# Patient Record
Sex: MALE | Race: WHITE | NOT HISPANIC OR LATINO | Employment: OTHER | URBAN - METROPOLITAN AREA
[De-identification: names, ages, dates, MRNs, and addresses within clinical notes are randomized per-mention and may not be internally consistent; named-entity substitution may affect disease eponyms.]

---

## 2021-02-26 ENCOUNTER — HOSPITAL ENCOUNTER (INPATIENT)
Facility: HOSPITAL | Age: 66
LOS: 6 days | Discharge: HOME | DRG: 382 | End: 2021-03-05
Attending: EMERGENCY MEDICINE | Admitting: INTERNAL MEDICINE
Payer: COMMERCIAL

## 2021-02-26 DIAGNOSIS — R07.89 ATYPICAL CHEST PAIN: ICD-10-CM

## 2021-02-26 DIAGNOSIS — K31.1 GASTRIC OUTLET OBSTRUCTION: ICD-10-CM

## 2021-02-26 DIAGNOSIS — R10.9 ABDOMINAL PAIN: Primary | ICD-10-CM

## 2021-02-26 DIAGNOSIS — K57.10 DUODENAL DIVERTICULUM: ICD-10-CM

## 2021-02-26 LAB
ALBUMIN SERPL BCP-MCNC: 3.1 G/DL (ref 3.5–5)
ALP SERPL-CCNC: 112 U/L (ref 46–116)
ALT SERPL W P-5'-P-CCNC: 24 U/L (ref 12–78)
ANION GAP SERPL CALCULATED.3IONS-SCNC: 9 MMOL/L (ref 4–13)
AST SERPL W P-5'-P-CCNC: 25 U/L (ref 5–45)
BASOPHILS # BLD AUTO: 0.07 THOUSANDS/ΜL (ref 0–0.1)
BASOPHILS NFR BLD AUTO: 1 % (ref 0–1)
BILIRUB SERPL-MCNC: 0.4 MG/DL (ref 0.2–1)
BUN SERPL-MCNC: 17 MG/DL (ref 5–25)
CALCIUM ALBUM COR SERPL-MCNC: 9.1 MG/DL (ref 8.3–10.1)
CALCIUM SERPL-MCNC: 8.4 MG/DL (ref 8.3–10.1)
CHLORIDE SERPL-SCNC: 106 MMOL/L (ref 100–108)
CO2 SERPL-SCNC: 23 MMOL/L (ref 21–32)
CREAT SERPL-MCNC: 1.12 MG/DL (ref 0.6–1.3)
EOSINOPHIL # BLD AUTO: 0.5 THOUSAND/ΜL (ref 0–0.61)
EOSINOPHIL NFR BLD AUTO: 6 % (ref 0–6)
ERYTHROCYTE [DISTWIDTH] IN BLOOD BY AUTOMATED COUNT: 12.3 % (ref 11.6–15.1)
GFR SERPL CREATININE-BSD FRML MDRD: 70 ML/MIN/1.73SQ M
GLUCOSE SERPL-MCNC: 165 MG/DL (ref 65–140)
HCT VFR BLD AUTO: 41.5 % (ref 36.5–49.3)
HGB BLD-MCNC: 13.6 G/DL (ref 12–17)
IMM GRANULOCYTES # BLD AUTO: 0.02 THOUSAND/UL (ref 0–0.2)
IMM GRANULOCYTES NFR BLD AUTO: 0 % (ref 0–2)
LYMPHOCYTES # BLD AUTO: 3.25 THOUSANDS/ΜL (ref 0.6–4.47)
LYMPHOCYTES NFR BLD AUTO: 38 % (ref 14–44)
MCH RBC QN AUTO: 31.6 PG (ref 26.8–34.3)
MCHC RBC AUTO-ENTMCNC: 32.8 G/DL (ref 31.4–37.4)
MCV RBC AUTO: 96 FL (ref 82–98)
MONOCYTES # BLD AUTO: 0.91 THOUSAND/ΜL (ref 0.17–1.22)
MONOCYTES NFR BLD AUTO: 11 % (ref 4–12)
NEUTROPHILS # BLD AUTO: 3.78 THOUSANDS/ΜL (ref 1.85–7.62)
NEUTS SEG NFR BLD AUTO: 44 % (ref 43–75)
NRBC BLD AUTO-RTO: 0 /100 WBCS
PLATELET # BLD AUTO: 230 THOUSANDS/UL (ref 149–390)
PMV BLD AUTO: 11.5 FL (ref 8.9–12.7)
POTASSIUM SERPL-SCNC: 3.9 MMOL/L (ref 3.5–5.3)
PROT SERPL-MCNC: 6.6 G/DL (ref 6.4–8.2)
RBC # BLD AUTO: 4.31 MILLION/UL (ref 3.88–5.62)
SODIUM SERPL-SCNC: 138 MMOL/L (ref 136–145)
TROPONIN I SERPL-MCNC: <0.02 NG/ML
WBC # BLD AUTO: 8.53 THOUSAND/UL (ref 4.31–10.16)

## 2021-02-26 PROCEDURE — 99285 EMERGENCY DEPT VISIT HI MDM: CPT | Performed by: EMERGENCY MEDICINE

## 2021-02-26 PROCEDURE — 84484 ASSAY OF TROPONIN QUANT: CPT | Performed by: EMERGENCY MEDICINE

## 2021-02-26 PROCEDURE — 36415 COLL VENOUS BLD VENIPUNCTURE: CPT | Performed by: EMERGENCY MEDICINE

## 2021-02-26 PROCEDURE — 99285 EMERGENCY DEPT VISIT HI MDM: CPT

## 2021-02-26 PROCEDURE — 80053 COMPREHEN METABOLIC PANEL: CPT | Performed by: EMERGENCY MEDICINE

## 2021-02-26 PROCEDURE — 85025 COMPLETE CBC W/AUTO DIFF WBC: CPT | Performed by: EMERGENCY MEDICINE

## 2021-02-26 PROCEDURE — 93005 ELECTROCARDIOGRAM TRACING: CPT

## 2021-02-27 ENCOUNTER — APPOINTMENT (EMERGENCY)
Dept: CT IMAGING | Facility: HOSPITAL | Age: 66
DRG: 382 | End: 2021-02-27
Payer: COMMERCIAL

## 2021-02-27 PROBLEM — R07.89 ATYPICAL CHEST PAIN: Status: ACTIVE | Noted: 2021-02-27

## 2021-02-27 PROBLEM — E78.5 HLD (HYPERLIPIDEMIA): Status: ACTIVE | Noted: 2021-02-27

## 2021-02-27 PROBLEM — K57.10 DUODENAL DIVERTICULUM: Status: ACTIVE | Noted: 2021-02-27

## 2021-02-27 PROBLEM — K31.1 GASTRIC OUTLET OBSTRUCTION: Status: ACTIVE | Noted: 2021-02-27

## 2021-02-27 PROBLEM — Z87.820 HISTORY OF TRAUMATIC BRAIN INJURY: Status: ACTIVE | Noted: 2021-02-27

## 2021-02-27 LAB
ANION GAP SERPL CALCULATED.3IONS-SCNC: 8 MMOL/L (ref 4–13)
BUN SERPL-MCNC: 19 MG/DL (ref 5–25)
CALCIUM SERPL-MCNC: 8.6 MG/DL (ref 8.3–10.1)
CHLORIDE SERPL-SCNC: 106 MMOL/L (ref 100–108)
CO2 SERPL-SCNC: 25 MMOL/L (ref 21–32)
CREAT SERPL-MCNC: 0.91 MG/DL (ref 0.6–1.3)
ERYTHROCYTE [DISTWIDTH] IN BLOOD BY AUTOMATED COUNT: 12.3 % (ref 11.6–15.1)
GFR SERPL CREATININE-BSD FRML MDRD: 89 ML/MIN/1.73SQ M
GLUCOSE P FAST SERPL-MCNC: 103 MG/DL (ref 65–99)
GLUCOSE SERPL-MCNC: 103 MG/DL (ref 65–140)
HCT VFR BLD AUTO: 42.2 % (ref 36.5–49.3)
HGB BLD-MCNC: 14 G/DL (ref 12–17)
MCH RBC QN AUTO: 31.8 PG (ref 26.8–34.3)
MCHC RBC AUTO-ENTMCNC: 33.2 G/DL (ref 31.4–37.4)
MCV RBC AUTO: 96 FL (ref 82–98)
PLATELET # BLD AUTO: 203 THOUSANDS/UL (ref 149–390)
PMV BLD AUTO: 10.7 FL (ref 8.9–12.7)
POTASSIUM SERPL-SCNC: 3.8 MMOL/L (ref 3.5–5.3)
RBC # BLD AUTO: 4.4 MILLION/UL (ref 3.88–5.62)
SODIUM SERPL-SCNC: 139 MMOL/L (ref 136–145)
TROPONIN I SERPL-MCNC: <0.02 NG/ML
WBC # BLD AUTO: 7.52 THOUSAND/UL (ref 4.31–10.16)

## 2021-02-27 PROCEDURE — 74174 CTA ABD&PLVS W/CONTRAST: CPT

## 2021-02-27 PROCEDURE — 99222 1ST HOSP IP/OBS MODERATE 55: CPT | Performed by: INTERNAL MEDICINE

## 2021-02-27 PROCEDURE — 84484 ASSAY OF TROPONIN QUANT: CPT | Performed by: PHYSICIAN ASSISTANT

## 2021-02-27 PROCEDURE — C9113 INJ PANTOPRAZOLE SODIUM, VIA: HCPCS | Performed by: PHYSICIAN ASSISTANT

## 2021-02-27 PROCEDURE — 99220 PR INITIAL OBSERVATION CARE/DAY 70 MINUTES: CPT | Performed by: INTERNAL MEDICINE

## 2021-02-27 PROCEDURE — C9113 INJ PANTOPRAZOLE SODIUM, VIA: HCPCS | Performed by: EMERGENCY MEDICINE

## 2021-02-27 PROCEDURE — 85027 COMPLETE CBC AUTOMATED: CPT | Performed by: PHYSICIAN ASSISTANT

## 2021-02-27 PROCEDURE — 71275 CT ANGIOGRAPHY CHEST: CPT

## 2021-02-27 PROCEDURE — 80048 BASIC METABOLIC PNL TOTAL CA: CPT | Performed by: PHYSICIAN ASSISTANT

## 2021-02-27 PROCEDURE — G1004 CDSM NDSC: HCPCS

## 2021-02-27 RX ORDER — PANTOPRAZOLE SODIUM 20 MG/1
20 TABLET, DELAYED RELEASE ORAL 2 TIMES DAILY
Status: ON HOLD | COMMUNITY
End: 2021-03-04 | Stop reason: SDUPTHER

## 2021-02-27 RX ORDER — HYDROMORPHONE HCL/PF 1 MG/ML
0.5 SYRINGE (ML) INJECTION EVERY 4 HOURS PRN
Status: DISCONTINUED | OUTPATIENT
Start: 2021-02-27 | End: 2021-03-02

## 2021-02-27 RX ORDER — ARIPIPRAZOLE 10 MG/1
TABLET ORAL DAILY
Status: ON HOLD | COMMUNITY
End: 2021-11-16 | Stop reason: ALTCHOICE

## 2021-02-27 RX ORDER — HYDROMORPHONE HCL/PF 1 MG/ML
0.2 SYRINGE (ML) INJECTION ONCE
Status: COMPLETED | OUTPATIENT
Start: 2021-02-27 | End: 2021-02-27

## 2021-02-27 RX ORDER — ASPIRIN 81 MG/1
81 TABLET, CHEWABLE ORAL DAILY
COMMUNITY

## 2021-02-27 RX ORDER — SODIUM CHLORIDE, SODIUM GLUCONATE, SODIUM ACETATE, POTASSIUM CHLORIDE, MAGNESIUM CHLORIDE, SODIUM PHOSPHATE, DIBASIC, AND POTASSIUM PHOSPHATE .53; .5; .37; .037; .03; .012; .00082 G/100ML; G/100ML; G/100ML; G/100ML; G/100ML; G/100ML; G/100ML
125 INJECTION, SOLUTION INTRAVENOUS CONTINUOUS
Status: DISCONTINUED | OUTPATIENT
Start: 2021-02-27 | End: 2021-03-03

## 2021-02-27 RX ORDER — PANTOPRAZOLE SODIUM 40 MG/1
40 INJECTION, POWDER, FOR SOLUTION INTRAVENOUS ONCE
Status: COMPLETED | OUTPATIENT
Start: 2021-02-27 | End: 2021-02-27

## 2021-02-27 RX ORDER — ONDANSETRON 2 MG/ML
4 INJECTION INTRAMUSCULAR; INTRAVENOUS EVERY 6 HOURS PRN
Status: DISCONTINUED | OUTPATIENT
Start: 2021-02-27 | End: 2021-03-05 | Stop reason: HOSPADM

## 2021-02-27 RX ORDER — ATORVASTATIN CALCIUM 40 MG/1
80 TABLET, FILM COATED ORAL
COMMUNITY

## 2021-02-27 RX ORDER — DEXTROAMPHETAMINE SACCHARATE, AMPHETAMINE ASPARTATE, DEXTROAMPHETAMINE SULFATE AND AMPHETAMINE SULFATE 7.5; 7.5; 7.5; 7.5 MG/1; MG/1; MG/1; MG/1
20 TABLET ORAL 2 TIMES DAILY
Status: ON HOLD | COMMUNITY
End: 2021-11-16 | Stop reason: DRUGHIGH

## 2021-02-27 RX ORDER — PANTOPRAZOLE SODIUM 40 MG/1
40 INJECTION, POWDER, FOR SOLUTION INTRAVENOUS EVERY 12 HOURS SCHEDULED
Status: DISCONTINUED | OUTPATIENT
Start: 2021-02-27 | End: 2021-03-04

## 2021-02-27 RX ORDER — HYDROMORPHONE HCL/PF 1 MG/ML
0.5 SYRINGE (ML) INJECTION EVERY 6 HOURS PRN
Status: DISCONTINUED | OUTPATIENT
Start: 2021-02-27 | End: 2021-02-27

## 2021-02-27 RX ADMIN — HYDROMORPHONE HYDROCHLORIDE 0.5 MG: 1 INJECTION, SOLUTION INTRAMUSCULAR; INTRAVENOUS; SUBCUTANEOUS at 16:49

## 2021-02-27 RX ADMIN — HYDROMORPHONE HYDROCHLORIDE 0.2 MG: 1 INJECTION, SOLUTION INTRAMUSCULAR; INTRAVENOUS; SUBCUTANEOUS at 05:09

## 2021-02-27 RX ADMIN — SODIUM CHLORIDE, SODIUM GLUCONATE, SODIUM ACETATE, POTASSIUM CHLORIDE, MAGNESIUM CHLORIDE, SODIUM PHOSPHATE, DIBASIC, AND POTASSIUM PHOSPHATE 100 ML/HR: .53; .5; .37; .037; .03; .012; .00082 INJECTION, SOLUTION INTRAVENOUS at 06:12

## 2021-02-27 RX ADMIN — PANTOPRAZOLE SODIUM 40 MG: 40 INJECTION, POWDER, FOR SOLUTION INTRAVENOUS at 03:54

## 2021-02-27 RX ADMIN — HYDROMORPHONE HYDROCHLORIDE 0.5 MG: 1 INJECTION, SOLUTION INTRAMUSCULAR; INTRAVENOUS; SUBCUTANEOUS at 12:01

## 2021-02-27 RX ADMIN — ONDANSETRON 4 MG: 2 INJECTION INTRAMUSCULAR; INTRAVENOUS at 05:08

## 2021-02-27 RX ADMIN — HYDROMORPHONE HYDROCHLORIDE 0.5 MG: 1 INJECTION, SOLUTION INTRAMUSCULAR; INTRAVENOUS; SUBCUTANEOUS at 21:00

## 2021-02-27 RX ADMIN — SODIUM CHLORIDE, SODIUM GLUCONATE, SODIUM ACETATE, POTASSIUM CHLORIDE, MAGNESIUM CHLORIDE, SODIUM PHOSPHATE, DIBASIC, AND POTASSIUM PHOSPHATE 100 ML/HR: .53; .5; .37; .037; .03; .012; .00082 INJECTION, SOLUTION INTRAVENOUS at 16:38

## 2021-02-27 RX ADMIN — PANTOPRAZOLE SODIUM 40 MG: 40 INJECTION, POWDER, FOR SOLUTION INTRAVENOUS at 16:38

## 2021-02-27 RX ADMIN — IOHEXOL 100 ML: 350 INJECTION, SOLUTION INTRAVENOUS at 00:58

## 2021-02-27 NOTE — UTILIZATION REVIEW
OBSERVATION 2/27/21 @ 0308 CONVERTED TO INPATIENT [email or web address] DUE TO GASTRIC OUTLET OBSTRUCTION REQUIRING NPO, NGTUBE AND GI CONSULT  Initial Clinical Review    Admission: Date/Time/Statement:   02/27/21 1417  Inpatient Admission Once     Question Answer Comment   Level of Care Med Surg    Estimated length of stay More than 2 Midnights    Certification I certify that inpatient services are medically necessary for this patient for a duration of greater than two midnights  See H&P and MD Progress Notes for additional information about the patient's course of treatment  02/27/21 1416         ED Arrival Information     Expected Arrival Acuity Means of Arrival Escorted By Service Admission Type    - 2/26/2021 22:52 [facility]gent [facility]) General Medicine Emergency    Arrival Complaint    CHEST PAIN        Chief Complaint   Patient presents with    Chest Pain     Assessment/Plan:  63year old male to the ED from home via EMS with complaints of chest pain radiating to back which started 45 minutes prior to arrival with some lightheadedness  Admitted under observation then converted to inpatient  for duodenal diverticulum, gastric outlet obstruction  Arrives with abdominal tendernesss and distension  NGTube placed     * Duodenal diverticulum  Assessment & Plan  · Epigastric stabbing/burning sensation that radiates b/l across lower chest x1 year with increase in severity of pain last night at 2100, as well as pain radiating around to back   · Prior similar severe pain one year ago that was treated at the [facility] ED in [place] with acid suppressive medication per patient   · Troponin <0 02 x2 - EKG with 1st degree AV block and nonspecific ST-T wave abnormalities (T wave inversion in V1 and slight ST depression in V2), no old for comparison   · CT originally obtained in ED to rule out dissection   · CTA C/A: "4 6 cm fluid and air-containing duodenal diverticulum in the 3rd portion of the exam causing narrowing of the duodenum   There is mild gastric outlet obstruction  Patel Salas is another smaller duodenal diverticulum measuring 3 6 x 1 5 cm  No aortic dissection or intramural hematoma   There is no aortic aneurysm   the celiac artery, SMA, renal arteries and KIARA are patent "  · ED spoke with surgery, Dr Ryne Cifuentes, who recommended admission for GI evaluation and NG tube placement as there could be underlying ulcer, however most diverticulum resolve with acid suppressive medications and time   · Protonix 40mg IV BID   · NPO  · NGT placed in ED  · GI consult      Gastric outlet obstruction  Assessment & Plan  · Reported early satiety and intermittent emesis s/p PO intake a couple of times per week x1 year   · CT C/A: "Mild gastric outlet obstruction "  · GI consult   · Protonix 40mg BID IV     History of traumatic brain injury  Assessment & Plan  · [facility] [place]  · Reported intake of Adderall 30mg BID (confirmed with PDMP) and Abilify of unknown dose through [place]      HLD (hyperlipidemia)  Assessment & Plan  · Home regimen: Atorvastatin 40 mg daily   · Will continue  ED Triage Vitals   Temperature Pulse Respirations Blood Pressure SpO2   02/26/21 2300 02/26/21 2300 02/26/21 2300 02/26/21 2300 02/26/21 2300   98 7 °F (37 1 °C) 80 18 136/66 94 %      Temp Source Heart Rate Source Patient Position - Orthostatic VS BP Location FiO2 (%)   02/26/21 2300 02/26/21 2300 02/26/21 2300 02/26/21 2300 --   Temporal Monitor Sitting Left arm       Pain Score       02/27/21 0426       7          Wt Readings from Last 1 Encounters:   02/27/21 102 kg (225 lb 3 2 oz)     Additional Vital Signs: *  /Time  Temp  Pulse  Resp  BP  MAP (mmHg)  SpO2  O2 Device  Patient Position - Orthostatic VS   02/27/21 07:30:08  98 2 °F (36 8 °C)  59  --  120/61  81  95 %  None (Room air)  Lying   02/27/21 0426  --  --  --  --  --  --  None (Room air)  --   02/27/21 04:16:02  97 3 °F (36 3 °C)Abnormal   61  16  124/72  89  96 %  None (Room air)  Sitting   02/27/21 0330  --  61  15  110/57  --  94 %  None (Room air)  Sitting   02/27/21 0130  --  62  18  110/57  --  93 %  None (Room air)  Lying   02/27/21 0000  --  67  18  95/54  70  93 %  None (Room air)  Lying   02/26/21 2330  --  75  18  100/60  69  93 %  None (Room air)  Lying   02/26/21 2300  98 7 °F (37 1 °C)                   Pertinent Labs/Diagnostic Test Results:   2/27 CTA dissection protocol: No evidence of aortic dissection or aneurysm     Fluid and air-containing lesion in the 3rd portion of the duodenum causing narrowing of the duodenum and mild gastric outlet obstruction  2/26 EKG:  Interpretation:     Interpretation: non-specific    Rate:     ECG rate:  77    ECG rate assessment: normal    Rhythm:     Rhythm: sinus rhythm    Ectopy:     Ectopy: none    QRS:     QRS axis:  Normal    QRS intervals:  Normal  Conduction:     Conduction: abnormal      Abnormal conduction: 1st degree    ST segments:     ST segments:  Non-specific    Depression:  AVF and II  T waves:     T waves: inverted      Inverted:  V2            Results from last 7 days   Lab Units 02/27/21  0551 02/26/21  2324   WBC Thousand/uL 7 52 8 53   HEMOGLOBIN g/dL 14 0 13 6   HEMATOCRIT % 42 2 41 5   PLATELETS Thousands/uL 203 230   NEUTROS ABS Thousands/µL  --  3 78         Results from last 7 days   Lab Units 02/27/21  0551 02/26/21  2324   SODIUM mmol/L 139 138   POTASSIUM mmol/L 3 8 3 9   CHLORIDE mmol/L 106 106   CO2 mmol/L 25 23   ANION GAP mmol/L 8 9   BUN mg/dL 19 17   CREATININE mg/dL 0 91 1 12   EGFR ml/min/1 73sq m 89 70   CALCIUM mg/dL 8 6 8 4     Results from last 7 days   Lab Units 02/26/21  2324   AST U/L 25   ALT U/L 24   ALK PHOS U/L 112   TOTAL PROTEIN g/dL 6 6   ALBUMIN g/dL 3 1*   TOTAL BILIRUBIN mg/dL 0 40         Results from last 7 days   Lab Units 02/27/21  0551 02/26/21  2324   GLUCOSE RANDOM mg/dL 103 165*         Results from last 7 days   Lab Units 02/27/21  0426 02/26/21  2324   TROPONIN I ng/mL <0 02 <0 02       ED Treatment:   Medication Administration from 02/26/2021 2252 to 02/27/2021 0410       Date/Time Order Dose Route Action     02/27/2021 0354 pantoprazole (PROTONIX) injection 40 mg 40 mg Intravenous Given          Admitting Diagnosis: Gastric outlet obstruction [K31 1]  Chest pain [R07 9]  Abdominal pain [R10 9]  Atypical chest pain [R07 89]  Duodenal diverticulum [K57 10]  Age/Sex: 63 y o  male  Admission Orders:  NGTUbe   NPO  Scheduled Medications:  pantoprazole, 40 mg, Intravenous, Q12H [address]      Continuous IV Infusions:  multi-electrolyte, 100 mL/hr, Intravenous, Continuous      PRN Meds:  ondansetron, 4 mg, Intravenous, Q6H PRN        IP CONSULT TO GASTROENTEROLOGY    Network Utilization Review Department  ATTENTION: Please call with any questions or concerns to [phone] and carefully listen to the prompts so that you are directed to the right person  All voicemails are confidential   Jen Siddiqui all requests for admission clinical reviews, approved or denied determinations and any other requests to dedicated fax number below belonging to the campus where the patient is receiving treatment   List of dedicated fax numbers for the Facilities:  [address] DENIALS (Administrative/Medical Necessity) [phone]   [address] (Maternity/NICU/Pediatrics) [phone]   [address] Ann Gli 1946 (  Renzo Carlson "Erin" 103) [id number] [facility] 28 Tammy Rubi Gallagher 1481 [address] [facility]) 1014 [facility] [phone]

## 2021-02-27 NOTE — ASSESSMENT & PLAN NOTE
Incidental finding of duodenal diverticulum in the 3rd portion seen on CT scan A/P causing narrowing of the duodenum  Would focus on mgt for GOO for now  If no improvement with conservative mgt, would repeat CT scan A/p

## 2021-02-27 NOTE — PLAN OF CARE
Problem: PAIN - ADULT  Goal: Verbalizes/displays adequate comfort level or baseline comfort level  Description: Interventions:  - Encourage patient to monitor pain and request assistance  - Assess pain using appropriate pain scale  - Administer analgesics based on type and severity of pain and evaluate response  - Implement non-pharmacological measures as appropriate and evaluate response  - Consider cultural and social influences on pain and pain management  - Notify physician/advanced practitioner if interventions unsuccessful or patient reports new pain  2/27/2021 1247 by Brandon Sabillon RN  Outcome: Progressing  2/27/2021 1147 by Brandon Sabillon RN  Outcome: Progressing     Problem: SAFETY ADULT  Goal: Patient will remain free of falls  Description: INTERVENTIONS:  - Assess patient frequently for physical needs  -  Identify cognitive and physical deficits and behaviors that affect risk of falls    -  [place] fall precautions as indicated by assessment   - Educate patient/family on patient safety including physical limitations  - Instruct patient to call for assistance with activity based on assessment  - Modify environment to reduce risk of injury  - Consider OT/PT consult to assist with strengthening/mobility  2/27/2021 1247 by Brandon Sabillon RN  Outcome: Progressing  2/27/2021 1147 by Brandon Sabillon RN  Outcome: Progressing  Goal: Maintain or return to baseline ADL function  Description: INTERVENTIONS:  -  Assess patient's ability to carry out ADLs; assess patient's baseline for ADL function and identify physical deficits which impact ability to perform ADLs (bathing, care of mouth/teeth, toileting, grooming, dressing, etc )  - Assess/evaluate cause of self-care deficits   - Assess range of motion  - Assess patient's mobility; develop plan if impaired  - Assess patient's need for assistive devices and provide as appropriate  - Encourage maximum independence but intervene and supervise when necessary  - Involve family in performance of ADLs  - Assess for home care needs following discharge   - Consider OT consult to assist with ADL evaluation and planning for discharge  - Provide patient education as appropriate  2/27/2021 1247 by Carver Gowers, RN  Outcome: Progressing  2/27/2021 1147 by Carver Gowers, RN  Outcome: Progressing  Goal: Maintain or return mobility status to optimal level  Description: INTERVENTIONS:  - Assess patient's baseline mobility status (ambulation, transfers, stairs, etc )    - Identify cognitive and physical deficits and behaviors that affect mobility  - Identify mobility aids required to assist with transfers and/or ambulation (gait belt, sit-to-stand, lift, walker, cane, etc )  - [place] fall precautions as indicated by assessment  - Record patient progress and toleration of activity level on Mobility SBAR; progress patient to next Phase/Stage  - Instruct patient to call for assistance with activity based on assessment  - Consider rehabilitation consult to assist with strengthening/weightbearing, etc   2/27/2021 1247 by Carver Gowers, RN  Outcome: Progressing  2/27/2021 1147 by Carver Gowers, RN  Outcome: Progressing     Problem: Nutrition  Goal: Nutrition/Hydration status is improving  Description: Monitor and assess patient's nutrition/hydration status for malnutrition (ex- brittle hair, bruises, dry skin, pale skin and conjunctiva, muscle wasting, smooth red tongue, and disorientation)  Collaborate with interdisciplinary team and initiate plan and interventions as ordered  Monitor patient's weight and dietary intake as ordered or per policy  Utilize nutrition screening tool and intervene per policy  Determine patient's food preferences and provide high-protein, high-caloric foods as appropriate  - Assist patient with eating   - Allow adequate time for meals   - Encourage patient to take dietary supplement as ordered    - Collaborate with clinical nutritionist   - Include patient/family/caregiver in decisions related to nutrition    2/27/2021 1247 by Rock Trent RN  Outcome: Progressing  2/27/2021 1147 by Rock Trent RN  Outcome: Progressing     Problem: GASTROINTESTINAL - ADULT  Goal: Minimal or absence of nausea and/or vomiting  Description: INTERVENTIONS:  - Administer IV fluids if ordered to ensure adequate hydration  - Maintain NPO status until nausea and vomiting are resolved  - Nasogastric tube if ordered  - Administer ordered antiemetic medications as needed  - Provide nonpharmacologic comfort measures as appropriate  - Advance diet as tolerated, if ordered  - Consider nutrition services referral to assist patient with adequate nutrition and appropriate food choices  2/27/2021 1247 by Rock Trent RN  Outcome: Progressing  2/27/2021 1147 by Rock Trent RN  Outcome: Progressing  Goal: Maintains or returns to baseline bowel function  Description: INTERVENTIONS:  - Assess bowel function  - Encourage oral fluids to ensure adequate hydration  - Administer IV fluids if ordered to ensure adequate hydration  - Administer ordered medications as needed  - Encourage mobilization and activity  - Consider nutritional services referral to assist patient with adequate nutrition and appropriate food choices  2/27/2021 1247 by Rock Trent RN  Outcome: Progressing  2/27/2021 1147 by Rock Trent RN  Outcome: Progressing  Goal: Maintains adequate nutritional intake  Description: INTERVENTIONS:  - Monitor percentage of each meal consumed  - Identify factors contributing to decreased intake, treat as appropriate  - Assist with meals as needed  - Monitor I&O, weight, and lab values if indicated  - Obtain nutrition services referral as needed  2/27/2021 1247 by Rock Trent RN  Outcome: Progressing  2/27/2021 1147 by Rock Trent RN  Outcome: Progressing  Goal: Establish and maintain optimal ostomy function  Description: INTERVENTIONS:  - Assess bowel function  - Encourage oral fluids to ensure adequate hydration  - Administer IV fluids if ordered to ensure adequate hydration   - Administer ordered medications as needed  - Encourage mobilization and activity  - Nutrition services referral to assist patient with appropriate food choices  - Assess stoma site  - Consider wound care consult   2/27/2021 1247 by Mariam Birmingham RN  Outcome: Progressing  2/27/2021 1147 by Mariam Birmingham RN  Outcome: Progressing     Problem: Nutrition/Hydration-ADULT  Goal: Nutrient/Hydration intake appropriate for improving, restoring or maintaining nutritional needs  Description: Monitor and assess patient's nutrition/hydration status for malnutrition  Collaborate with interdisciplinary team and initiate plan and interventions as ordered  Monitor patient's weight and dietary intake as ordered or per policy  Utilize nutrition screening tool and intervene as necessary  Determine patient's food preferences and provide high-protein, high-caloric foods as appropriate       INTERVENTIONS:  - Monitor oral intake, urinary output, labs, and treatment plans  - Assess nutrition and hydration status and recommend course of action  - Evaluate amount of meals eaten  - Assist patient with eating if necessary   - Allow adequate time for meals  - Recommend/ encourage appropriate diets, oral nutritional supplements, and vitamin/mineral supplements  - Order, calculate, and assess calorie counts as needed  - Recommend, monitor, and adjust tube feedings and TPN/PPN based on assessed needs  - Assess need for intravenous fluids  - Provide specific nutrition/hydration education as appropriate  - Include patient/family/caregiver in decisions related to nutrition  2/27/2021 1247 by Mariam Birmingham RN  Outcome: Progressing  2/27/2021 1147 by Mariam Birmingham RN  Outcome: Progressing

## 2021-02-27 NOTE — ED PROVIDER NOTES
History  Chief Complaint   Patient presents with    Chest Pain     63year old male presents for evaluation of severe chest pain radiating to his back in a band like distribution beginning 45 minutes ago  Pain was initially sharp, but has dulled since receiving nitroglycerin and aspirin by EMS  He states that 30 minutes prior to the chest pain, he had experienced some lightheadedness, but did not lose consciousness  Lightheadedness has since resolved  He denies recent illness  No history of similar in the past  He states he had undergone catheterization in the past, but did not have stents placed  He states he believes he had been told he had a small aortic aneurysm, but has not follow up for this  He denies history of hypertension  History provided by:  Patient  Chest Pain  Pain location:  Substernal area  Pain quality: sharp    Pain radiates to:  Upper back  Pain radiates to the back: yes    Pain severity:  Severe  Onset quality:  Sudden  Duration:  45 minutes  Timing:  Constant  Progression:  Improving  Chronicity:  New  Relieved by:  Aspirin and nitroglycerin  Worsened by:  Nothing tried  Ineffective treatments:  None tried  Associated symptoms: no abdominal pain, no cough, no diaphoresis, no fatigue, no fever, no headache, no nausea, no palpitations, no shortness of breath, not vomiting and no weakness    Risk factors: smoking        None       History reviewed  No pertinent past medical history  Past Surgical History:   Procedure Laterality Date    CARDIAC PACEMAKER PLACEMENT         History reviewed  No pertinent family history  I have reviewed and agree with the history as documented      E-Cigarette/Vaping    E-Cigarette Use Never User      E-Cigarette/Vaping Substances     Social History     Tobacco Use    Smoking status: Current Every Day Smoker     Packs/day: 0 25     Types: Cigarettes    Smokeless tobacco: Never Used   Substance Use Topics    Alcohol use: Never     Frequency: Never    Drug use: Never       Review of Systems   Constitutional: Negative for appetite change, diaphoresis, fatigue and fever  HENT: Negative for congestion, rhinorrhea and sore throat  Respiratory: Negative for cough, chest tightness and shortness of breath  Cardiovascular: Positive for chest pain  Negative for palpitations and leg swelling  Gastrointestinal: Negative for abdominal pain, constipation, diarrhea, nausea and vomiting  Genitourinary: Negative for difficulty urinating, dysuria, frequency and hematuria  Musculoskeletal: Negative for myalgias, neck pain and neck stiffness  Skin: Negative for pallor  Neurological: Positive for light-headedness  Negative for syncope, weakness and headaches  All other systems reviewed and are negative  Physical Exam  Physical Exam  Vitals signs and nursing note reviewed  Constitutional:       General: He is not in acute distress  Appearance: He is well-developed  He is not toxic-appearing or diaphoretic  HENT:      Head: Normocephalic and atraumatic  Eyes:      Pupils: Pupils are equal, round, and reactive to light  Neck:      Musculoskeletal: Normal range of motion  Thyroid: No thyromegaly  Trachea: No tracheal deviation  Cardiovascular:      Rate and Rhythm: Normal rate and regular rhythm  Heart sounds: Normal heart sounds  Pulmonary:      Effort: Pulmonary effort is normal       Breath sounds: Normal breath sounds  Abdominal:      General: Bowel sounds are normal  There is no distension  Palpations: Abdomen is soft  Tenderness: There is no abdominal tenderness  Lymphadenopathy:      Cervical: No cervical adenopathy  Skin:     General: Skin is warm and dry           Vital Signs  ED Triage Vitals [02/26/21 2300]   Temperature Pulse Respirations Blood Pressure SpO2   98 7 °F (37 1 °C) 80 18 136/66 94 %      Temp Source Heart Rate Source Patient Position - Orthostatic VS BP Location FiO2 (%)   Temporal Monitor Sitting Left arm --      Pain Score       --           Vitals:    02/26/21 2300 02/26/21 2330 02/27/21 0000 02/27/21 0130   BP: 136/66 100/60 95/54 110/57   Pulse: 80 75 67 62   Patient Position - Orthostatic VS: Sitting Lying Lying Lying         Visual Acuity      ED Medications  Medications   benzocaine (HURRICAINE) 20 % mucosal spray 2 spray (has no administration in time range)   pantoprazole (PROTONIX) injection 40 mg (has no administration in time range)   iohexol (OMNIPAQUE) 350 MG/ML injection (SINGLE-DOSE) 100 mL (100 mL Intravenous Given 2/27/21 0058)       Diagnostic Studies  Results Reviewed     Procedure Component Value Units Date/Time    Troponin I [[id number]]     Lab Status: No result Specimen: Blood     Comprehensive metabolic panel [[id number]]  (Abnormal) Collected: 02/26/21 2324    Lab Status: Final result Specimen: Blood from Arm, Left Updated: 02/26/21 2359     Sodium 138 mmol/L      Potassium 3 9 mmol/L      Chloride 106 mmol/L      CO2 23 mmol/L      ANION GAP 9 mmol/L      BUN 17 mg/dL      Creatinine 1 12 mg/dL      Glucose 165 mg/dL      Calcium 8 4 mg/dL      Corrected Calcium 9 1 mg/dL      AST 25 U/L      ALT 24 U/L      Alkaline Phosphatase 112 U/L      Total Protein 6 6 g/dL      Albumin 3 1 g/dL      Total Bilirubin 0 40 mg/dL      eGFR 70 ml/min/1 73sq m     Narrative:      [place] guidelines for Chronic Kidney Disease (CKD):     Stage 1 with normal or high GFR (GFR > 90 mL/min/1 73 square meters)    Stage 2 Mild CKD (GFR = 60-89 mL/min/1 73 square meters)    Stage 3A Moderate CKD (GFR = 45-59 mL/min/1 73 square meters)    Stage 3B Moderate CKD (GFR = 30-44 mL/min/1 73 square meters)    Stage 4 Severe CKD (GFR = 15-29 mL/min/1 73 square meters)    Stage 5 End Stage CKD (GFR <15 mL/min/1 73 square meters)  Note: GFR calculation is accurate only with a steady state creatinine    Troponin I [[id number]]  (Normal) Collected: 02/26/21 2324    Lab Status: Final result Specimen: Blood from Arm, Right Updated: 02/26/21 2359     Troponin I <0 02 ng/mL     CBC and differential [[id number]] Collected: 02/26/21 2324    Lab Status: Final result Specimen: Blood from Arm, Left Updated: 02/26/21 2338     WBC 8 53 Thousand/uL      RBC 4 31 Million/uL      Hemoglobin 13 6 g/dL      Hematocrit 41 5 %      MCV 96 fL      MCH 31 6 pg      MCHC 32 8 g/dL      RDW 12 3 %      MPV 11 5 fL      Platelets 590 Thousands/uL      nRBC 0 /100 WBCs      Neutrophils Relative 44 %      Immat GRANS % 0 %      Lymphocytes Relative 38 %      Monocytes Relative 11 %      Eosinophils Relative 6 %      Basophils Relative 1 %      Neutrophils Absolute 3 78 Thousands/µL      Immature Grans Absolute 0 02 Thousand/uL      Lymphocytes Absolute 3 25 Thousands/µL      Monocytes Absolute 0 91 Thousand/µL      Eosinophils Absolute 0 50 Thousand/µL      Basophils Absolute 0 07 Thousands/µL                  CTA dissection protocol chest/abdomen/pelvis   Final Result by Octavia Pizarro DO (02/27 2496)      No evidence of aortic dissection or aneurysm      Fluid and air-containing lesion in the 3rd portion of the duodenum causing narrowing of the duodenum and mild gastric outlet obstruction               I personally discussed this study with Glen Burdick on 2/27/2021 at 1:50 AM                   Workstation performed: [id number]                    Procedures  ECG 12 Lead Documentation Only    Date/Time: 2/26/2021 10:58 PM  Performed by: Luigi Dubin, MD  Authorized by: Luigi Dubin, MD     Indications / Diagnosis:  Chest pain  ECG reviewed by me, the ED Provider: yes    Patient location:  ED  Previous ECG:     Previous ECG:  Unavailable  Interpretation:     Interpretation: non-specific    Rate:     ECG rate:  77    ECG rate assessment: normal    Rhythm:     Rhythm: sinus rhythm    Ectopy:     Ectopy: none    QRS:     QRS axis:  Normal    QRS intervals:  Normal  Conduction:     Conduction: abnormal Abnormal conduction: 1st degree    ST segments:     ST segments:  Non-specific    Depression:  AVF and II  T waves:     T waves: inverted      Inverted:  V2             ED Course             HEART Risk Score      Most Recent Value   Heart Score Risk Calculator   History  1 Filed at: 02/27/2021 0010   ECG  1 Filed at: 02/27/2021 0010   Age  1 Filed at: 02/27/2021 0010   Risk Factors  1 Filed at: 02/27/2021 0010   Troponin  0 Filed at: 02/27/2021 0010   HEART Score  4 Filed at: 02/27/2021 0010                                    MDM  Number of Diagnoses or Management Options  Abdominal pain: new and requires workup  Atypical chest pain: new and requires workup  Duodenal diverticulum: new and requires workup  Gastric outlet obstruction: new and requires workup  Diagnosis management comments: 63year old male presents for evaluation of sudden onset of severe chest pain radiating to the back  Concern for possible aortic dissection  EKG nonspecific  Patient had received aspirin and nitroglycerine for symptoms by EMS prior to arrival  HEART score 4  CTA negative for dissection, but showed "fluid and air-containing lesion in the 3rd portion of the duodenum causing narrowing of the duodenum and mild gastric outlet obstruction " Patient signed out to Dr Tuan Romero         Amount and/or Complexity of Data Reviewed  Clinical lab tests: ordered and reviewed  Tests in the radiology section of CPT®: ordered and reviewed    Patient Progress  Patient progress: stable      Disposition  Final diagnoses:   Abdominal pain   Duodenal diverticulum   Gastric outlet obstruction   Atypical chest pain     Time reflects when diagnosis was documented in both MDM as applicable and the Disposition within this note     Time User Action Codes Description Comment    2/27/2021  2:58 AM Malou Randa Add [R10 9] Abdominal pain     2/27/2021  2:59 AM Malou [place] Add [K57 10] Duodenal diverticulum     2/27/2021  2:59 AM Malou [place] Add [K31 1] Gastric outlet obstruction     2/27/2021  3:06 AM Zebedee Aschoff Add [R07 89] Atypical chest pain       ED Disposition     ED Disposition Condition Date/Time Comment    Admit Stable Sat Feb 27, 2021  3:07 AM Case was discussed with MARY and the patient's admission status was agreed to be Admission Status: observation status to the service of Dr Thanh Blackman   Follow-up Information    None         Patient's Medications    No medications on file     No discharge procedures on file      PDMP Review     None          ED Provider  Electronically Signed by           Yancy Haines MD  02/27/21 7501

## 2021-02-27 NOTE — CONSULTS
Consultation - [facility]     Tarah Amaya 63 y o  male MRN: [id number]  Unit/Bed#: -01 Encounter: [id number]    Consults    ASSESSMENT and PLAN    Epigastric pain/abnormal CT scan of the stomach and duodenum - 61-year-old gentleman with history of colonic diverticulosis who has been having progressively worsening abdominal pain over the past year  Tolerating only small meals  Now with CT scan suggesting partial gastric outlet obstruction and possible diverticula the duodenum  The story is most consistent with progressive a peptic ulcer disease  This certainly could cause gastric outlet obstruction  Duodenal diverticulosis does not necessarily cause symptoms and there is not clear evidence of duodenal diverticulitis that would  He could have an intraluminal bezoar that is partially obstructing the lumen in either the stomach duodenum or both  This is less likely to be malignant  · Agree with conservative management of PPI  · NGT to decompress the stomach  · If no improvement would consider repeat CT to assess for any sign of duodenal diverticulitis  · Once decompressed with plan EGD to evaluate for ulcer, stricture or malignancy    Chief Complaint   Patient presents with    Chest Pain       Physician Requesting Consult: Bruna Landrum MD    HPI  Tarah Amaya is a 63y o  year old male who presents with progressively worsening abdominal pain over the past year  Pain is described as epigastric  He has early satiety and has only been eating small amounts but throughout the day  No weight loss  Occasional emesis of food and bile no hematemesis  Denies any melena or hematochezia  Denies any history of peptic ulcer disease  Is on aspirin for cardiac prophylaxis  Denies NSAIDs  States he had resection of colon for diverticulitis but no other abdominal surgeries, no history of peptic ulcer disease, gastric surgery or small bowel surgery    Admitted for further evaluation and CT scan shows possible partial gastric outlet obstruction, duodenal diverticula though not clearly diverticulitis  Historical Information   History reviewed  No pertinent past medical history  Past Surgical History:   Procedure Laterality Date    CARDIAC PACEMAKER PLACEMENT       Social History   Social History     Substance and Sexual Activity   Alcohol Use Never    Frequency: Never    Binge frequency: Never     Social History     Substance and Sexual Activity   Drug Use Never     Social History     Tobacco Use   Smoking Status Current Every Day Smoker    Packs/day: 0 25    Types: Cigarettes   Smokeless Tobacco Never Used     History reviewed  No pertinent family history  Meds/Allergies     Current Facility-Administered Medications   Medication Dose Route Frequency    HYDROmorphone (DILAUDID) injection 0 5 mg  0 5 mg Intravenous Q4H PRN    multi-electrolyte (PLASMALYTE-A/ISOLYTE-S PH 7 4) IV solution  100 mL/hr Intravenous Continuous    ondansetron (ZOFRAN) injection 4 mg  4 mg Intravenous Q6H PRN    pantoprazole (PROTONIX) injection 40 mg  40 mg Intravenous Q12H [facility]     Medications Prior to Admission   Medication    amphetamine-dextroamphetamine (ADDERALL) 30 MG tablet    ARIPiprazole (ABILIFY) 10 mg tablet    aspirin 81 mg chewable tablet    atorvastatin (LIPITOR) 40 mg tablet    pantoprazole (PROTONIX) 20 mg tablet       Allergies   Allergen Reactions    Benadryl Allergy [Diphenhydramine] Other (See Comments)     unknown       PHYSICAL EXAM    Blood pressure 120/61, pulse 59, temperature 98 2 °F (36 8 °C), temperature source Oral, resp  rate 16, height 6' 4" (1 93 m), weight 102 kg (225 lb 3 2 oz), SpO2 95 %  Body mass index is 27 41 kg/m²  General Appearance: NAD, cooperative, alert  Eyes: Anicteric, PERRLA, EOMI  ENT:  Normocephalic, atraumatic, normal mucosa      Neck:  Supple, symmetrical, trachea midline  Resp:  Clear to auscultation bilaterally; no rales, rhonchi or wheezing; respirations unlabored   CV:  S1 S2, Regular rate and rhythm; no murmur, rub, or gallop  GI:  Soft, non-tender, non-distended; normal bowel sounds; no masses, no organomegaly   Rectal: Deferred  Musculoskeletal: No cyanosis, clubbing or edema  Normal ROM  Skin:  No jaundice, rashes, or lesions   Heme/Lymph: No palpable cervical lymphadenopathy  Psych: Normal affect, good eye contact  Neuro: No gross deficits, AAOx3    Lab Results   Component Value Date    CALCIUM 8 6 02/27/2021    K 3 8 02/27/2021    CO2 25 02/27/2021     02/27/2021    BUN 19 02/27/2021    CREATININE 0 91 02/27/2021     Lab Results   Component Value Date    WBC 7 52 02/27/2021    HGB 14 0 02/27/2021    HCT 42 2 02/27/2021    MCV 96 02/27/2021     02/27/2021     Lab Results   Component Value Date    ALT 24 02/26/2021    AST 25 02/26/2021    ALKPHOS 112 02/26/2021     No results found for: AMYLASE  No results found for: LIPASE  No results found for: IRON, TIBC, FERRITIN  No results found for: INR    Imaging Studies: I have personally reviewed pertinent films in PACS    EKG, Pathology, and Other Studies: I have personally reviewed pertinent reports  REVIEW OF SYSTEMS:    CONSTITUTIONAL: Denies any fever, chills, rigors, and weight loss  HEENT: No earache or tinnitus  Denies hearing loss or visual disturbances  CARDIOVASCULAR: No chest pain or palpitations  RESPIRATORY: Denies any cough, hemoptysis, shortness of breath or dyspnea on exertion  GASTROINTESTINAL: As noted in the History of Present Illness  GENITOURINARY: No problems with urination  Denies any hematuria or dysuria  NEUROLOGIC: No dizziness or vertigo, denies headaches  MUSCULOSKELETAL: Denies any muscle or joint pain  SKIN: Denies skin rashes or itching  ENDOCRINE: Denies excessive thirst  Denies intolerance to heat or cold  PSYCHOSOCIAL: Denies depression or anxiety  Denies any recent memory loss

## 2021-02-27 NOTE — ASSESSMENT & PLAN NOTE
· Epigastric stabbing/burning sensation that radiates b/l across lower chest x1 year with increase in severity of pain last night at 2100, as well as pain radiating around to back   · Prior similar severe pain one year ago that was treated at the [place] [facility] in [place] with acid suppressive medication per patient   · Troponin <0 02 x2 - EKG with 1st degree AV block and nonspecific ST-T wave abnormalities (T wave inversion in V1 and slight ST depression in V2), no old for comparison   · CT originally obtained in ED to rule out dissection   · CTA C/A: "4 6 cm fluid and air-containing duodenal diverticulum in the 3rd portion of the exam causing narrowing of the duodenum  There is mild gastric outlet obstruction  There is another smaller duodenal diverticulum measuring 3 6 x 1 5 cm  No aortic dissection or intramural hematoma  There is no aortic aneurysm    the celiac artery, SMA, renal arteries and KIARA are patent "  · ED spoke with surgery, Dr Reji Fabian, who recommended admission for GI evaluation and NG tube placement as there could be underlying ulcer, however most diverticulum resolve with acid suppressive medications and time   · Protonix 40mg IV BID   · NPO  · NGT placed in ED  · GI consult bed Statement Selected

## 2021-02-27 NOTE — H&P
[facility] History and Physical    H&P- Rosie Gage 1955, 63 y o  male MRN: [id number]    Unit/Bed#: -[facility] Encounter: [id number]    Primary Care Provider: No primary care provider on file  Date and time admitted to hospital: 2/26/2021 10:56 PM    * Duodenal diverticulum  Assessment & Plan  · Epigastric stabbing/burning sensation that radiates b/l across lower chest x1 year with increase in severity of pain last night at 2100, as well as pain radiating around to back   · Prior similar severe pain one year ago that was treated at the [facility] ED in [place] with acid suppressive medication per patient   · Troponin <0 02 x2 - EKG with 1st degree AV block and nonspecific ST-T wave abnormalities (T wave inversion in V1 and slight ST depression in V2), no old for comparison   · CT originally obtained in ED to rule out dissection   · CTA C/A: "4 6 cm fluid and air-containing duodenal diverticulum in the 3rd portion of the exam causing narrowing of the duodenum  There is mild gastric outlet obstruction  There is another smaller duodenal diverticulum measuring 3 6 x 1 5 cm  No aortic dissection or intramural hematoma  There is no aortic aneurysm    the celiac artery, SMA, renal arteries and KIARA are patent "  · ED spoke with surgery, Dr Reji Fabian, who recommended admission for GI evaluation and NG tube placement as there could be underlying ulcer, however most diverticulum resolve with acid suppressive medications and time   · Protonix 40mg IV BID   · NPO  · NGT placed in ED  · GI consult     Gastric outlet obstruction  Assessment & Plan  · Reported early satiety and intermittent emesis s/p PO intake a couple of times per week x1 year   · CT C/A: "Mild gastric outlet obstruction "  · GI consult   · Protonix 40mg BID IV    History of traumatic brain injury  Assessment & Plan  · [facility] [place]  · Reported intake of Adderall 30mg BID (confirmed with PDMP) and Abilify of unknown dose through [address] [facility] (hyperlipidemia)  Assessment & Plan  · Home regimen: Atorvastatin 40 mg daily   · Will continue    VTE Prophylaxis: Pt ambulatory  / sequential compression device   Code Status: Level 1 Full Code   POLST: POLST form is not discussed and not completed at this time  Discussion with family: I spoke with patient about plan     Anticipated Length of Stay:  Patient will be admitted on an Observation basis with an anticipated length of stay of  < 2 midnights  Justification for Hospital Stay: GI evaluation     Total Time for Visit, including Counseling / Coordination of Care: 1 hour  Greater than 50% of this total time spent on direct patient counseling and coordination of care  Chief Complaint:   "severe stomach pain that goes to back since 9:00pm"    History of Present Illness:    Lakia Storey is a 63 y o  male with PMHx of TBI and HLD who presents for evaluation of epigastric stabbing/burning  Pt states that last night at 2100 the pain became severe and radiated around to his back  He admits to epigastric stabbing that radiated to b/l lower chest for the last 1 year  The pain is noted after large amounts of PO intake  This pain has occurred daily for the last 1 year  Last night, he states the pain was in the same location and felt the same, however it was more severe and radiated all the way around to his back  He states that he had the exact same symptoms 1 year ago and was treated with [facility] ED with acid suppressive medications and discharged home  He also reports abdominal bloating, 6 episodes of diarrhea daily, and emesis after PO intake a couple of times per week x1 year  He has taken his daily Protonix and antacids without significant change in symptoms  Pt denies being evaluated by any other provider for these symptoms in the last year   Denies fever, chills, change in urination, new headaches or dizziness (hx of vertigo - unchanged), numbness, weakness, chest pain, sore throat, or peripheral edema  Review of Systems:    Review of Systems   Constitutional: Negative for chills and fever  HENT: Negative for congestion and sore throat  Eyes: Negative for visual disturbance  Respiratory: Negative for cough and shortness of breath  Cardiovascular: Negative for chest pain, palpitations and leg swelling  Gastrointestinal: Positive for abdominal pain, diarrhea, nausea and vomiting  Negative for constipation  Genitourinary: Negative for difficulty urinating, dysuria and hematuria  Musculoskeletal: Negative for gait problem  Neurological: Positive for dizziness  Negative for weakness and numbness  All other systems reviewed and are negative  Past Medical and Surgical History:     History reviewed  No pertinent past medical history  Past Surgical History:   Procedure Laterality Date    CARDIAC PACEMAKER PLACEMENT         Meds/Allergies:    Prior to Admission medications    Medication Sig Start Date End Date Taking? Authorizing Provider   amphetamine-dextroamphetamine (ADDERALL) 30 MG tablet Take 30 mg by mouth 2 (two) times a day 0700 and 1400   Yes Historical Provider, MD   ARIPiprazole (ABILIFY) 10 mg tablet Take by mouth daily Pt does not know dose   Yes Historical Provider, MD   aspirin 81 mg chewable tablet Chew 81 mg daily   Yes Historical Provider, MD   atorvastatin (LIPITOR) 40 mg tablet Take 40 mg by mouth daily with dinner   Yes Historical Provider, MD   pantoprazole (PROTONIX) 20 mg tablet Take 20 mg by mouth 2 (two) times a day   Yes Historical Provider, MD     I have reviewed home medications with patient personally  Allergies: Allergies   Allergen Reactions    Benadryl Allergy [Diphenhydramine] Other (See Comments)     unknown       Social History:     Marital Status: Unknown   Occupation: N/A  Patient Pre-hospital Living Situation: pt stated he lived at home with wife and children, however [facility] called asking for update of the patient  Patient Pre-hospital Level of Mobility: independent   Patient Pre-hospital Diet Restrictions: none   Substance Use History:   Social History     Substance and Sexual Activity   Alcohol Use Never    Frequency: Never    Binge frequency: Never     Social History     Tobacco Use   Smoking Status Current Every Day Smoker    Packs/day: 0 25    Types: Cigarettes   Smokeless Tobacco Never Used     Social History     Substance and Sexual Activity   Drug Use Never       Family History:    History reviewed  No pertinent family history  Physical Exam:     Vitals:   Blood Pressure: 124/72 (02/27/21 0416)  Pulse: 61 (02/27/21 0416)  Temperature: (!) 97 3 °F (36 3 °C) (02/27/21 0416)  Temp Source: Oral (02/27/21 0416)  Respirations: 16 (02/27/21 0416)  Height: 6' 4" (193 cm) (02/27/21 0416)  Weight - Scale: 102 kg (225 lb 3 2 oz) (02/27/21 0416)  SpO2: 96 % (02/27/21 0416)    Physical Exam  Vitals signs and nursing note reviewed  Constitutional:       Appearance: Normal appearance  HENT:      Head: Normocephalic  Nose: Nose normal       Mouth/Throat:      Mouth: Mucous membranes are moist    Eyes:      Conjunctiva/sclera: Conjunctivae normal    Neck:      Musculoskeletal: Normal range of motion and neck supple  Cardiovascular:      Rate and Rhythm: Normal rate and regular rhythm  Pulses: Normal pulses  Heart sounds: No murmur  Pulmonary:      Effort: Pulmonary effort is normal       Breath sounds: Normal breath sounds  Abdominal:      General: There is distension (mild)  Palpations: Abdomen is soft  Tenderness: There is abdominal tenderness (epigastric region)  There is no guarding or rebound  Hernia: No hernia is present  Comments: Suction heard of NGT in LUQ - BS normoactive in remaining quadrants   Musculoskeletal: Normal range of motion  Right lower leg: No edema  Left lower leg: No edema  Skin:     General: Skin is warm and dry        Capillary Refill: Capillary refill takes less than 2 seconds  Neurological:      General: No focal deficit present  Mental Status: He is alert  Psychiatric:         Thought Content: Thought content normal       Comments: Flat affect         Additional Data:     Lab Results: I have personally reviewed pertinent reports  Results from last 7 days   Lab Units 02/26/21  2324   WBC Thousand/uL 8 53   HEMOGLOBIN g/dL 13 6   HEMATOCRIT % 41 5   PLATELETS Thousands/uL 230   NEUTROS PCT % 44   LYMPHS PCT % 38   MONOS PCT % 11   EOS PCT % 6     Results from last 7 days   Lab Units 02/26/21  2324   SODIUM mmol/L 138   POTASSIUM mmol/L 3 9   CHLORIDE mmol/L 106   CO2 mmol/L 23   BUN mg/dL 17   CREATININE mg/dL 1 12   ANION GAP mmol/L 9   CALCIUM mg/dL 8 4   ALBUMIN g/dL 3 1*   TOTAL BILIRUBIN mg/dL 0 40   ALK PHOS U/L 112   ALT U/L 24   AST U/L 25   GLUCOSE RANDOM mg/dL 165*                       Imaging: I have personally reviewed pertinent reports  CTA dissection protocol chest/abdomen/pelvis   Final Result by Michelle Alcantara DO (02/27 9706)      No evidence of aortic dissection or aneurysm      Fluid and air-containing lesion in the 3rd portion of the duodenum causing narrowing of the duodenum and mild gastric outlet obstruction  I personally discussed this study with Alisha Garcia on 2/27/2021 at 1:50 AM                   Workstation performed: [id number]             EKG, Pathology, and Other Studies Reviewed on Admission:   · EKG: Sinus rhythm with 1st degree AV block and nonspecific ST-T wave abnormalities  T wave inversion in V1 and slight ST depression in V2  No old for comparison  Allscripts / Epic Records Reviewed: Yes     ** Please Note: This note has been constructed using a voice recognition system   **

## 2021-02-27 NOTE — PLAN OF CARE
Problem: PAIN - ADULT  Goal: Verbalizes/displays adequate comfort level or baseline comfort level  Description: Interventions:  - Encourage patient to monitor pain and request assistance  - Assess pain using appropriate pain scale  - Administer analgesics based on type and severity of pain and evaluate response  - Implement non-pharmacological measures as appropriate and evaluate response  - Consider cultural and social influences on pain and pain management  - Notify physician/advanced practitioner if interventions unsuccessful or patient reports new pain  Outcome: Progressing     Problem: SAFETY ADULT  Goal: Patient will remain free of falls  Description: INTERVENTIONS:  - Assess patient frequently for physical needs  -  Identify cognitive and physical deficits and behaviors that affect risk of falls    -  [place] fall precautions as indicated by assessment   - Educate patient/family on patient safety including physical limitations  - Instruct patient to call for assistance with activity based on assessment  - Modify environment to reduce risk of injury  - Consider OT/PT consult to assist with strengthening/mobility  Outcome: Progressing  Goal: Maintain or return to baseline ADL function  Description: INTERVENTIONS:  -  Assess patient's ability to carry out ADLs; assess patient's baseline for ADL function and identify physical deficits which impact ability to perform ADLs (bathing, care of mouth/teeth, toileting, grooming, dressing, etc )  - Assess/evaluate cause of self-care deficits   - Assess range of motion  - Assess patient's mobility; develop plan if impaired  - Assess patient's need for assistive devices and provide as appropriate  - Encourage maximum independence but intervene and supervise when necessary  - Involve family in performance of ADLs  - Assess for home care needs following discharge   - Consider OT consult to assist with ADL evaluation and planning for discharge  - Provide patient education as appropriate  Outcome: Progressing  Goal: Maintain or return mobility status to optimal level  Description: INTERVENTIONS:  - Assess patient's baseline mobility status (ambulation, transfers, stairs, etc )    - Identify cognitive and physical deficits and behaviors that affect mobility  - Identify mobility aids required to assist with transfers and/or ambulation (gait belt, sit-to-stand, lift, walker, cane, etc )  - [place] fall precautions as indicated by assessment  - Record patient progress and toleration of activity level on Mobility SBAR; progress patient to next Phase/Stage  - Instruct patient to call for assistance with activity based on assessment  - Consider rehabilitation consult to assist with strengthening/weightbearing, etc   Outcome: Progressing     Problem: Nutrition  Goal: Nutrition/Hydration status is improving  Description: Monitor and assess patient's nutrition/hydration status for malnutrition (ex- brittle hair, bruises, dry skin, pale skin and conjunctiva, muscle wasting, smooth red tongue, and disorientation)  Collaborate with interdisciplinary team and initiate plan and interventions as ordered  Monitor patient's weight and dietary intake as ordered or per policy  Utilize nutrition screening tool and intervene per policy  Determine patient's food preferences and provide high-protein, high-caloric foods as appropriate  - Assist patient with eating   - Allow adequate time for meals   - Encourage patient to take dietary supplement as ordered  - Collaborate with clinical nutritionist   - Include patient/family/caregiver in decisions related to nutrition    Outcome: Progressing     Problem: GASTROINTESTINAL - ADULT  Goal: Minimal or absence of nausea and/or vomiting  Description: INTERVENTIONS:  - Administer IV fluids if ordered to ensure adequate hydration  - Maintain NPO status until nausea and vomiting are resolved  - Nasogastric tube if ordered  - Administer ordered antiemetic medications as needed  - Provide nonpharmacologic comfort measures as appropriate  - Advance diet as tolerated, if ordered  - Consider nutrition services referral to assist patient with adequate nutrition and appropriate food choices  Outcome: Progressing  Goal: Maintains or returns to baseline bowel function  Description: INTERVENTIONS:  - Assess bowel function  - Encourage oral fluids to ensure adequate hydration  - Administer IV fluids if ordered to ensure adequate hydration  - Administer ordered medications as needed  - Encourage mobilization and activity  - Consider nutritional services referral to assist patient with adequate nutrition and appropriate food choices  Outcome: Progressing  Goal: Maintains adequate nutritional intake  Description: INTERVENTIONS:  - Monitor percentage of each meal consumed  - Identify factors contributing to decreased intake, treat as appropriate  - Assist with meals as needed  - Monitor I&O, weight, and lab values if indicated  - Obtain nutrition services referral as needed  Outcome: Progressing  Goal: Establish and maintain optimal ostomy function  Description: INTERVENTIONS:  - Assess bowel function  - Encourage oral fluids to ensure adequate hydration  - Administer IV fluids if ordered to ensure adequate hydration   - Administer ordered medications as needed  - Encourage mobilization and activity  - Nutrition services referral to assist patient with appropriate food choices  - Assess stoma site  - Consider wound care consult   Outcome: Progressing

## 2021-02-27 NOTE — PLAN OF CARE
Problem: PAIN - ADULT  Goal: Verbalizes/displays adequate comfort level or baseline comfort level  Description: Interventions:  - Encourage patient to monitor pain and request assistance  - Assess pain using appropriate pain scale  - Administer analgesics based on type and severity of pain and evaluate response  - Implement non-pharmacological measures as appropriate and evaluate response  - Consider cultural and social influences on pain and pain management  - Notify physician/advanced practitioner if interventions unsuccessful or patient reports new pain  Outcome: Progressing     Problem: SAFETY ADULT  Goal: Patient will remain free of falls  Description: INTERVENTIONS:  - Assess patient frequently for physical needs  -  Identify cognitive and physical deficits and behaviors that affect risk of falls    -  [place] fall precautions as indicated by assessment   - Educate patient/family on patient safety including physical limitations  - Instruct patient to call for assistance with activity based on assessment  - Modify environment to reduce risk of injury  - Consider OT/PT consult to assist with strengthening/mobility  Outcome: Progressing  Goal: Maintain or return to baseline ADL function  Description: INTERVENTIONS:  -  Assess patient's ability to carry out ADLs; assess patient's baseline for ADL function and identify physical deficits which impact ability to perform ADLs (bathing, care of mouth/teeth, toileting, grooming, dressing, etc )  - Assess/evaluate cause of self-care deficits   - Assess range of motion  - Assess patient's mobility; develop plan if impaired  - Assess patient's need for assistive devices and provide as appropriate  - Encourage maximum independence but intervene and supervise when necessary  - Involve family in performance of ADLs  - Assess for home care needs following discharge   - Consider OT consult to assist with ADL evaluation and planning for discharge  - Provide patient education as appropriate  Outcome: Progressing  Goal: Maintain or return mobility status to optimal level  Description: INTERVENTIONS:  - Assess patient's baseline mobility status (ambulation, transfers, stairs, etc )    - Identify cognitive and physical deficits and behaviors that affect mobility  - Identify mobility aids required to assist with transfers and/or ambulation (gait belt, sit-to-stand, lift, walker, cane, etc )  - [place] fall precautions as indicated by assessment  - Record patient progress and toleration of activity level on Mobility SBAR; progress patient to next Phase/Stage  - Instruct patient to call for assistance with activity based on assessment  - Consider rehabilitation consult to assist with strengthening/weightbearing, etc   Outcome: Progressing     Problem: Nutrition  Goal: Nutrition/Hydration status is improving  Description: Monitor and assess patient's nutrition/hydration status for malnutrition (ex- brittle hair, bruises, dry skin, pale skin and conjunctiva, muscle wasting, smooth red tongue, and disorientation)  Collaborate with interdisciplinary team and initiate plan and interventions as ordered  Monitor patient's weight and dietary intake as ordered or per policy  Utilize nutrition screening tool and intervene per policy  Determine patient's food preferences and provide high-protein, high-caloric foods as appropriate  - Assist patient with eating   - Allow adequate time for meals   - Encourage patient to take dietary supplement as ordered  - Collaborate with clinical nutritionist   - Include patient/family/caregiver in decisions related to nutrition    Outcome: Progressing     Problem: GASTROINTESTINAL - ADULT  Goal: Minimal or absence of nausea and/or vomiting  Description: INTERVENTIONS:  - Administer IV fluids if ordered to ensure adequate hydration  - Maintain NPO status until nausea and vomiting are resolved  - Nasogastric tube if ordered  - Administer ordered antiemetic medications as needed  - Provide nonpharmacologic comfort measures as appropriate  - Advance diet as tolerated, if ordered  - Consider nutrition services referral to assist patient with adequate nutrition and appropriate food choices  Outcome: Progressing  Goal: Maintains or returns to baseline bowel function  Description: INTERVENTIONS:  - Assess bowel function  - Encourage oral fluids to ensure adequate hydration  - Administer IV fluids if ordered to ensure adequate hydration  - Administer ordered medications as needed  - Encourage mobilization and activity  - Consider nutritional services referral to assist patient with adequate nutrition and appropriate food choices  Outcome: Progressing  Goal: Maintains adequate nutritional intake  Description: INTERVENTIONS:  - Monitor percentage of each meal consumed  - Identify factors contributing to decreased intake, treat as appropriate  - Assist with meals as needed  - Monitor I&O, weight, and lab values if indicated  - Obtain nutrition services referral as needed  Outcome: Progressing  Goal: Establish and maintain optimal ostomy function  Description: INTERVENTIONS:  - Assess bowel function  - Encourage oral fluids to ensure adequate hydration  - Administer IV fluids if ordered to ensure adequate hydration   - Administer ordered medications as needed  - Encourage mobilization and activity  - Nutrition services referral to assist patient with appropriate food choices  - Assess stoma site  - Consider wound care consult   Outcome: Progressing     Problem: Nutrition/Hydration-ADULT  Goal: Nutrient/Hydration intake appropriate for improving, restoring or maintaining nutritional needs  Description: Monitor and assess patient's nutrition/hydration status for malnutrition  Collaborate with interdisciplinary team and initiate plan and interventions as ordered  Monitor patient's weight and dietary intake as ordered or per policy   Utilize nutrition screening tool and intervene as necessary  Determine patient's food preferences and provide high-protein, high-caloric foods as appropriate       INTERVENTIONS:  - Monitor oral intake, urinary output, labs, and treatment plans  - Assess nutrition and hydration status and recommend course of action  - Evaluate amount of meals eaten  - Assist patient with eating if necessary   - Allow adequate time for meals  - Recommend/ encourage appropriate diets, oral nutritional supplements, and vitamin/mineral supplements  - Order, calculate, and assess calorie counts as needed  - Recommend, monitor, and adjust tube feedings and TPN/PPN based on assessed needs  - Assess need for intravenous fluids  - Provide specific nutrition/hydration education as appropriate  - Include patient/family/caregiver in decisions related to nutrition  Outcome: Progressing

## 2021-02-27 NOTE — PLAN OF CARE
Problem: PAIN - ADULT  Goal: Verbalizes/displays adequate comfort level or baseline comfort level  Description: Interventions:  - Encourage patient to monitor pain and request assistance  - Assess pain using appropriate pain scale  - Administer analgesics based on type and severity of pain and evaluate response  - Implement non-pharmacological measures as appropriate and evaluate response  - Consider cultural and social influences on pain and pain management  - Notify physician/advanced practitioner if interventions unsuccessful or patient reports new pain  Outcome: Progressing     Problem: SAFETY ADULT  Goal: Patient will remain free of falls  Description: INTERVENTIONS:  - Assess patient frequently for physical needs  -  Identify cognitive and physical deficits and behaviors that affect risk of falls    -  [place] fall precautions as indicated by assessment   - Educate patient/family on patient safety including physical limitations  - Instruct patient to call for assistance with activity based on assessment  - Modify environment to reduce risk of injury  - Consider OT/PT consult to assist with strengthening/mobility  Outcome: Progressing  Goal: Maintain or return to baseline ADL function  Description: INTERVENTIONS:  -  Assess patient's ability to carry out ADLs; assess patient's baseline for ADL function and identify physical deficits which impact ability to perform ADLs (bathing, care of mouth/teeth, toileting, grooming, dressing, etc )  - Assess/evaluate cause of self-care deficits   - Assess range of motion  - Assess patient's mobility; develop plan if impaired  - Assess patient's need for assistive devices and provide as appropriate  - Encourage maximum independence but intervene and supervise when necessary  - Involve family in performance of ADLs  - Assess for home care needs following discharge   - Consider OT consult to assist with ADL evaluation and planning for discharge  - Provide patient education as appropriate  Outcome: Progressing  Goal: Maintain or return mobility status to optimal level  Description: INTERVENTIONS:  - Assess patient's baseline mobility status (ambulation, transfers, stairs, etc )    - Identify cognitive and physical deficits and behaviors that affect mobility  - Identify mobility aids required to assist with transfers and/or ambulation (gait belt, sit-to-stand, lift, walker, cane, etc )  - [place] fall precautions as indicated by assessment  - Record patient progress and toleration of activity level on Mobility SBAR; progress patient to next Phase/Stage  - Instruct patient to call for assistance with activity based on assessment  - Consider rehabilitation consult to assist with strengthening/weightbearing, etc   Outcome: Progressing     Problem: Nutrition  Goal: Nutrition/Hydration status is improving  Description: Monitor and assess patient's nutrition/hydration status for malnutrition (ex- brittle hair, bruises, dry skin, pale skin and conjunctiva, muscle wasting, smooth red tongue, and disorientation)  Collaborate with interdisciplinary team and initiate plan and interventions as ordered  Monitor patient's weight and dietary intake as ordered or per policy  Utilize nutrition screening tool and intervene per policy  Determine patient's food preferences and provide high-protein, high-caloric foods as appropriate  - Assist patient with eating   - Allow adequate time for meals   - Encourage patient to take dietary supplement as ordered  - Collaborate with clinical nutritionist   - Include patient/family/caregiver in decisions related to nutrition    Outcome: Progressing     Problem: GASTROINTESTINAL - ADULT  Goal: Minimal or absence of nausea and/or vomiting  Description: INTERVENTIONS:  - Administer IV fluids if ordered to ensure adequate hydration  - Maintain NPO status until nausea and vomiting are resolved  - Nasogastric tube if ordered  - Administer ordered antiemetic medications as needed  - Provide nonpharmacologic comfort measures as appropriate  - Advance diet as tolerated, if ordered  - Consider nutrition services referral to assist patient with adequate nutrition and appropriate food choices  Outcome: Progressing  Goal: Maintains or returns to baseline bowel function  Description: INTERVENTIONS:  - Assess bowel function  - Encourage oral fluids to ensure adequate hydration  - Administer IV fluids if ordered to ensure adequate hydration  - Administer ordered medications as needed  - Encourage mobilization and activity  - Consider nutritional services referral to assist patient with adequate nutrition and appropriate food choices  Outcome: Progressing  Goal: Maintains adequate nutritional intake  Description: INTERVENTIONS:  - Monitor percentage of each meal consumed  - Identify factors contributing to decreased intake, treat as appropriate  - Assist with meals as needed  - Monitor I&O, weight, and lab values if indicated  - Obtain nutrition services referral as needed  Outcome: Progressing  Goal: Establish and maintain optimal ostomy function  Description: INTERVENTIONS:  - Assess bowel function  - Encourage oral fluids to ensure adequate hydration  - Administer IV fluids if ordered to ensure adequate hydration   - Administer ordered medications as needed  - Encourage mobilization and activity  - Nutrition services referral to assist patient with appropriate food choices  - Assess stoma site  - Consider wound care consult   Outcome: Progressing     Problem: Nutrition/Hydration-ADULT  Goal: Nutrient/Hydration intake appropriate for improving, restoring or maintaining nutritional needs  Description: Monitor and assess patient's nutrition/hydration status for malnutrition  Collaborate with interdisciplinary team and initiate plan and interventions as ordered  Monitor patient's weight and dietary intake as ordered or per policy   Utilize nutrition screening tool and intervene as necessary  Determine patient's food preferences and provide high-protein, high-caloric foods as appropriate       INTERVENTIONS:  - Monitor oral intake, urinary output, labs, and treatment plans  - Assess nutrition and hydration status and recommend course of action  - Evaluate amount of meals eaten  - Assist patient with eating if necessary   - Allow adequate time for meals  - Recommend/ encourage appropriate diets, oral nutritional supplements, and vitamin/mineral supplements  - Order, calculate, and assess calorie counts as needed  - Recommend, monitor, and adjust tube feedings and TPN/PPN based on assessed needs  - Assess need for intravenous fluids  - Provide specific nutrition/hydration education as appropriate  - Include patient/family/caregiver in decisions related to nutrition  Outcome: Progressing

## 2021-02-27 NOTE — ASSESSMENT & PLAN NOTE
· [facility] [place]  · Reported intake of Adderall 30mg BID (confirmed with PDMP) and Abilify of unknown dose through [place]

## 2021-02-27 NOTE — ASSESSMENT & PLAN NOTE
· [facility] [place]  · Reported intake of Adderall 30mg BID (confirmed with PDMP) and Abilify of unknown dose through [place]   · Meds held while NPO

## 2021-02-27 NOTE — ED CARE HANDOFF
Emergency Department Sign Out Note        Sign out and transfer of care from Dr Eugene Zabala  See Separate Emergency Department note  The patient, Tarah Amaya, was evaluated by the previous provider for Chest pain  Workup Completed:  labwork    ED Course / Workup Pending (followup):  CTA chest      HEART Risk Score      Most Recent Value   Heart Score Risk Calculator   History  1 Filed at: 02/27/2021 0010   ECG  1 Filed at: 02/27/2021 0010   Age  1 Filed at: 02/27/2021 0010   Risk Factors  1 Filed at: 02/27/2021 0010   Troponin  0 Filed at: 02/27/2021 0010   HEART Score  4 Filed at: 02/27/2021 0010                                  ED Course as of Feb 27 0307   Sat Feb 27, 2021   0235 After discussion with the patient apparently he has been having indigestion, intermittent vomiting "for quite some time, states that whenever he eats a big meal he feels full and is unable to swallow anymore  States that he also has diarrhea with daily bowel movements  Does have history of bowel resection, cholecystectomy  Has never been told about any duodenal abnormalities or gastric outlet syndrome         0246 Reached out to General Surgery , Dr Verona Deluna via [facility] Text       0853 Suggested admitting the patient under Medicine with GI consult, possibly NG tube if has vomiting or continued pain      0255 Patient not currently vomiting though did discuss with him about possibly putting in an NG, he states that his stomach feels distended and he can not even bend down to tie his shoe secondary to discomfort, agrees with NG tube at this time        Procedures  MDM    Disposition  Final diagnoses:   Abdominal pain   Duodenal diverticulum   Gastric outlet obstruction   Atypical chest pain     Time reflects when diagnosis was documented in both MDM as applicable and the Disposition within this note     Time User Action Codes Description Comment    2/27/2021  2:58 AM Pippa Conklin Add [R10 9] Abdominal pain     2/27/2021  2:59 AM Lizette Gómez [F40 77] Duodenal diverticulum     2/27/2021  2:59 AM Lizette Gómez [K31 1] Gastric outlet obstruction     2/27/2021  3:06 AM Lizette Gómez [R07 89] Atypical chest pain       ED Disposition     ED Disposition Condition Date/Time Comment    Admit  Sat Feb 27, 2021  3:06 AM       Follow-up Information    None       Patient's Medications    No medications on file     No discharge procedures on file         ED Provider  Electronically Signed by     Mabel Cerda MD  02/27/21 8421

## 2021-02-27 NOTE — ASSESSMENT & PLAN NOTE
· Reported early satiety and intermittent emesis s/p PO intake a couple of times per week x1 year   · CT C/A: "Mild gastric outlet obstruction "  · GI consult   · Protonix 40mg BID IV

## 2021-02-27 NOTE — ASSESSMENT & PLAN NOTE
· Reported early satiety and intermittent emesis s/p PO intake a couple of times per week x1 year   · CT C/A: "Mild gastric outlet obstruction "  · NGT in place to decompress abdomen  · Continue with Protonix 40mg BID IV  · GI on board - for eventual EGD once decompressed appropriately

## 2021-02-28 LAB
ATRIAL RATE: 77 BPM
P AXIS: 37 DEGREES
PR INTERVAL: 260 MS
QRS AXIS: 37 DEGREES
QRSD INTERVAL: 100 MS
QT INTERVAL: 384 MS
QTC INTERVAL: 434 MS
T WAVE AXIS: 32 DEGREES
VENTRICULAR RATE: 77 BPM

## 2021-02-28 PROCEDURE — 93010 ELECTROCARDIOGRAM REPORT: CPT | Performed by: INTERNAL MEDICINE

## 2021-02-28 PROCEDURE — C9113 INJ PANTOPRAZOLE SODIUM, VIA: HCPCS | Performed by: PHYSICIAN ASSISTANT

## 2021-02-28 PROCEDURE — 99232 SBSQ HOSP IP/OBS MODERATE 35: CPT | Performed by: INTERNAL MEDICINE

## 2021-02-28 RX ADMIN — HYDROMORPHONE HYDROCHLORIDE 0.5 MG: 1 INJECTION, SOLUTION INTRAMUSCULAR; INTRAVENOUS; SUBCUTANEOUS at 18:51

## 2021-02-28 RX ADMIN — PANTOPRAZOLE SODIUM 40 MG: 40 INJECTION, POWDER, FOR SOLUTION INTRAVENOUS at 20:03

## 2021-02-28 RX ADMIN — HYDROMORPHONE HYDROCHLORIDE 0.5 MG: 1 INJECTION, SOLUTION INTRAMUSCULAR; INTRAVENOUS; SUBCUTANEOUS at 14:48

## 2021-02-28 RX ADMIN — HYDROMORPHONE HYDROCHLORIDE 0.5 MG: 1 INJECTION, SOLUTION INTRAMUSCULAR; INTRAVENOUS; SUBCUTANEOUS at 05:58

## 2021-02-28 RX ADMIN — HYDROMORPHONE HYDROCHLORIDE 0.5 MG: 1 INJECTION, SOLUTION INTRAMUSCULAR; INTRAVENOUS; SUBCUTANEOUS at 23:06

## 2021-02-28 RX ADMIN — SODIUM CHLORIDE, SODIUM GLUCONATE, SODIUM ACETATE, POTASSIUM CHLORIDE, MAGNESIUM CHLORIDE, SODIUM PHOSPHATE, DIBASIC, AND POTASSIUM PHOSPHATE 100 ML/HR: .53; .5; .37; .037; .03; .012; .00082 INJECTION, SOLUTION INTRAVENOUS at 13:42

## 2021-02-28 RX ADMIN — SODIUM CHLORIDE, SODIUM GLUCONATE, SODIUM ACETATE, POTASSIUM CHLORIDE, MAGNESIUM CHLORIDE, SODIUM PHOSPHATE, DIBASIC, AND POTASSIUM PHOSPHATE 100 ML/HR: .53; .5; .37; .037; .03; .012; .00082 INJECTION, SOLUTION INTRAVENOUS at 03:00

## 2021-02-28 RX ADMIN — HYDROMORPHONE HYDROCHLORIDE 0.5 MG: 1 INJECTION, SOLUTION INTRAMUSCULAR; INTRAVENOUS; SUBCUTANEOUS at 01:43

## 2021-02-28 RX ADMIN — PANTOPRAZOLE SODIUM 40 MG: 40 INJECTION, POWDER, FOR SOLUTION INTRAVENOUS at 09:52

## 2021-02-28 RX ADMIN — HYDROMORPHONE HYDROCHLORIDE 0.5 MG: 1 INJECTION, SOLUTION INTRAMUSCULAR; INTRAVENOUS; SUBCUTANEOUS at 10:33

## 2021-02-28 NOTE — PROGRESS NOTES
Jimena Obregon  [id number]    63 y o   male      ASSESSMENT and PLAN    Epigastric pain/abnormal CT scan of the stomach and duodenum - 31-year-old gentleman with history of colonic diverticulosis who has been having progressively worsening abdominal pain over the past year  Tolerating only small meals  Now with CT scan suggesting partial gastric outlet obstruction and possible diverticula of the duodenum  The story is most consistent with progressive peptic ulcer disease  This certainly could cause gastric outlet obstruction  Duodenal diverticulosis does not necessarily cause symptoms and there is not clear evidence of duodenal diverticulitis that would  He could have an intraluminal bezoar that is partially obstructing the lumen in either the stomach duodenum/diverticula or both  This is less likely to be malignant  · Continue conservative management with PPI  · NGT to decompress the stomach  · If no improvement would consider repeat CT to assess for any sign of duodenal diverticulitis  · Once decompressed will plan EGD to evaluate for ulcer, stricture, beazor or malignancy    Chief Complaint   Patient presents with    Chest Pain       SUBJECTIVE/HPI   still with epigastric pain, less distention  Not passing flatus or bowel normal hematochezia    Still with significant NG tube drainage    /72   Pulse 65   Temp 98 1 °F (36 7 °C)   Resp 16   Ht 6' 4" (1 93 m)   Wt 102 kg (225 lb 3 2 oz)   SpO2 94%   BMI 27 41 kg/m²     PHYSICALEXAM  General appearance: alert, appears stated age and cooperative  Head: Normocephalic, without obvious abnormality, atraumatic  Lungs: clear to auscultation bilaterally  Heart: regular rate and rhythm, S1, S2 normal, no murmur, click, rub or gallop  Abdomen: soft, mild epigastric tenderness, improved yesterday; bowel sounds normal; no masses,  no organomegaly  Extremities: extremities normal, atraumatic, no cyanosis or edema  Neurologic: Grossly normal    Lab Results Component Value Date    CALCIUM 8 6 02/27/2021    K 3 8 02/27/2021    CO2 25 02/27/2021     02/27/2021    BUN 19 02/27/2021    CREATININE 0 91 02/27/2021     Lab Results   Component Value Date    WBC 7 52 02/27/2021    HGB 14 0 02/27/2021    HCT 42 2 02/27/2021    MCV 96 02/27/2021     02/27/2021     Lab Results   Component Value Date    ALT 24 02/26/2021    AST 25 02/26/2021    ALKPHOS 112 02/26/2021     No results found for: AMYLASE  No results found for: LIPASE  No results found for: IRON, TIBC, FERRITIN  No results found for: INR    Counseling / Coordination of Care  Total floor / unit time spent today 20 minutes

## 2021-02-28 NOTE — PROGRESS NOTES
Progress Note - Cheryln Day 1955, 63 y o  male MRN: [id number]    Unit/Bed#: -01 Encounter: [id number]    Primary Care Provider: No primary care provider on file  Date and time admitted to hospital: 2/26/2021 10:56 PM        * Gastric outlet obstruction  Assessment & Plan  · Reported early satiety and intermittent emesis s/p PO intake a couple of times per week x1 year   · CT C/A: "Mild gastric outlet obstruction "  · NGT in place to decompress abdomen  · Continue with Protonix 40mg BID IV  · GI on board - for eventual EGD once decompressed appropriately    History of traumatic brain injury  Assessment & Plan  · [facility] [place]  · Reported intake of Adderall 30mg BID (confirmed with PDMP) and Abilify of unknown dose through [place]   · Meds held while NPO    HLD (hyperlipidemia)  Assessment & Plan  · Home regimen: Atorvastatin 40 mg daily   · Currently NPO, NG tube in place, meds held    Duodenal diverticulum  Assessment & Plan  Incidental finding of duodenal diverticulum in the 3rd portion seen on CT scan A/P causing narrowing of the duodenum  Would focus on mgt for GOO for now  If no improvement with conservative mgt, would repeat CT scan A/p        VTE Pharmacologic Prophylaxis:   Pharmacologic: Pharmacologic VTE Prophylaxis contraindicated due to low VTE screen score  Mechanical VTE Prophylaxis in Place: Yes    Patient Centered Rounds: I have performed bedside rounds with nursing staff today  Discussions with Specialists or Other Care Team Provider: GI    Education and Discussions with Family / Patient: Patient    Time Spent for Care: 30 minutes  More than 50% of total time spent on counseling and coordination of care as described above      Current Length of Stay: 1 day(s)    Current Patient Status: Inpatient   Certification Statement: The patient will continue to require additional inpatient hospital stay due to as above    Discharge Plan: pending EGD    Code Status: Level 1 - Full Code      Subjective:   No overnight events, EGD in place with bilious drainage    Objective:     Vitals:   Temp (24hrs), Av °F (36 7 °C), Min:97 9 °F (36 6 °C), Max:98 1 °F (36 7 °C)    Temp:  [97 9 °F (36 6 °C)-98 1 °F (36 7 °C)] 98 1 °F (36 7 °C)  HR:  [64-73] 65  Resp:  [16-18] 16  BP: (110-127)/(65-72) 114/72  SpO2:  [93 %-96 %] 94 %  Body mass index is 27 41 kg/m²  Input and Output Summary (last 24 hours): Intake/Output Summary (Last 24 hours) at 2021 0900  Last data filed at 2021 0601  Gross per 24 hour   Intake 1940 ml   Output 3425 ml   Net -1485 ml       Physical Exam:     Physical Exam  Vitals signs and nursing note reviewed  Constitutional:       General: He is not in acute distress  Appearance: Normal appearance  He is not ill-appearing, toxic-appearing or diaphoretic  Cardiovascular:      Rate and Rhythm: Normal rate and regular rhythm  Pulses: Normal pulses  Heart sounds: No murmur  Pulmonary:      Effort: Pulmonary effort is normal  No respiratory distress  Breath sounds: Normal breath sounds  No stridor  No wheezing, rhonchi or rales  Chest:      Chest wall: No tenderness  Abdominal:      General: Bowel sounds are normal  There is no distension  Palpations: Abdomen is soft  Tenderness: There is no abdominal tenderness  There is no right CVA tenderness, left CVA tenderness or guarding  Comments: NG tube in place - bilious secretions   Musculoskeletal: Normal range of motion  General: No swelling or deformity  Right lower leg: No edema  Left lower leg: No edema  Skin:     General: Skin is warm and dry  Capillary Refill: Capillary refill takes less than 2 seconds  Coloration: Skin is not jaundiced  Findings: No bruising, lesion or rash  Neurological:      General: No focal deficit present  Mental Status: He is alert  Mental status is at baseline        Cranial Nerves: No cranial nerve deficit  Psychiatric:         Mood and Affect: Mood normal          Additional Data:     Labs:    Results from last 7 days   Lab Units 02/27/21  0551 02/26/21  2324   WBC Thousand/uL 7 52 8 53   HEMOGLOBIN g/dL 14 0 13 6   HEMATOCRIT % 42 2 41 5   PLATELETS Thousands/uL 203 230   NEUTROS PCT %  --  44   LYMPHS PCT %  --  38   MONOS PCT %  --  11   EOS PCT %  --  6     Results from last 7 days   Lab Units 02/27/21  0551 02/26/21  2324   SODIUM mmol/L 139 138   POTASSIUM mmol/L 3 8 3 9   CHLORIDE mmol/L 106 106   CO2 mmol/L 25 23   BUN mg/dL 19 17   CREATININE mg/dL 0 91 1 12   ANION GAP mmol/L 8 9   CALCIUM mg/dL 8 6 8 4   ALBUMIN g/dL  --  3 1*   TOTAL BILIRUBIN mg/dL  --  0 40   ALK PHOS U/L  --  112   ALT U/L  --  24   AST U/L  --  25   GLUCOSE RANDOM mg/dL 103 165*                           * I Have Reviewed All Lab Data Listed Above  * Additional Pertinent Lab Tests Reviewed: All Labs Within Last 24 Hours Reviewed    Imaging:    Imaging Reports Reviewed Today Include:   Imaging Personally Reviewed by Myself Includes:      Recent Cultures (last 7 days):           Last 24 Hours Medication List:   Current Facility-Administered Medications   Medication Dose Route Frequency Provider Last Rate    HYDROmorphone  0 5 mg Intravenous Q4H PRN Michelle Lindo MD      multi-electrolyte  100 mL/hr Intravenous Continuous Gladys Romero PA-C 100 mL/hr (02/28/21 0300)    ondansetron  4 mg Intravenous Q6H PRN Gladys Romero PA-C      pantoprazole  40 mg Intravenous Q12H 87 Shanika Randhawa PA-C          Today, Patient Was Seen By: Michelle Lindo MD    ** Please Note: Dictation voice to text software may have been used in the creation of this document   **

## 2021-03-01 LAB
ANION GAP SERPL CALCULATED.3IONS-SCNC: 9 MMOL/L (ref 4–13)
BUN SERPL-MCNC: 17 MG/DL (ref 5–25)
CALCIUM SERPL-MCNC: 8.6 MG/DL (ref 8.3–10.1)
CHLORIDE SERPL-SCNC: 101 MMOL/L (ref 100–108)
CO2 SERPL-SCNC: 26 MMOL/L (ref 21–32)
CREAT SERPL-MCNC: 0.9 MG/DL (ref 0.6–1.3)
ERYTHROCYTE [DISTWIDTH] IN BLOOD BY AUTOMATED COUNT: 11.9 % (ref 11.6–15.1)
GFR SERPL CREATININE-BSD FRML MDRD: 91 ML/MIN/1.73SQ M
GLUCOSE SERPL-MCNC: 89 MG/DL (ref 65–140)
HCT VFR BLD AUTO: 44.5 % (ref 36.5–49.3)
HGB BLD-MCNC: 15.1 G/DL (ref 12–17)
MCH RBC QN AUTO: 32 PG (ref 26.8–34.3)
MCHC RBC AUTO-ENTMCNC: 33.9 G/DL (ref 31.4–37.4)
MCV RBC AUTO: 94 FL (ref 82–98)
PLATELET # BLD AUTO: 220 THOUSANDS/UL (ref 149–390)
PMV BLD AUTO: 10.7 FL (ref 8.9–12.7)
POTASSIUM SERPL-SCNC: 3.8 MMOL/L (ref 3.5–5.3)
RBC # BLD AUTO: 4.72 MILLION/UL (ref 3.88–5.62)
SODIUM SERPL-SCNC: 136 MMOL/L (ref 136–145)
WBC # BLD AUTO: 8.27 THOUSAND/UL (ref 4.31–10.16)

## 2021-03-01 PROCEDURE — C9113 INJ PANTOPRAZOLE SODIUM, VIA: HCPCS | Performed by: PHYSICIAN ASSISTANT

## 2021-03-01 PROCEDURE — 80048 BASIC METABOLIC PNL TOTAL CA: CPT | Performed by: INTERNAL MEDICINE

## 2021-03-01 PROCEDURE — 99232 SBSQ HOSP IP/OBS MODERATE 35: CPT | Performed by: INTERNAL MEDICINE

## 2021-03-01 PROCEDURE — 85027 COMPLETE CBC AUTOMATED: CPT | Performed by: INTERNAL MEDICINE

## 2021-03-01 RX ADMIN — HYDROMORPHONE HYDROCHLORIDE 0.5 MG: 1 INJECTION, SOLUTION INTRAMUSCULAR; INTRAVENOUS; SUBCUTANEOUS at 12:55

## 2021-03-01 RX ADMIN — PANTOPRAZOLE SODIUM 40 MG: 40 INJECTION, POWDER, FOR SOLUTION INTRAVENOUS at 08:33

## 2021-03-01 RX ADMIN — SODIUM CHLORIDE, SODIUM GLUCONATE, SODIUM ACETATE, POTASSIUM CHLORIDE, MAGNESIUM CHLORIDE, SODIUM PHOSPHATE, DIBASIC, AND POTASSIUM PHOSPHATE 125 ML/HR: .53; .5; .37; .037; .03; .012; .00082 INJECTION, SOLUTION INTRAVENOUS at 16:40

## 2021-03-01 RX ADMIN — SODIUM CHLORIDE, SODIUM GLUCONATE, SODIUM ACETATE, POTASSIUM CHLORIDE, MAGNESIUM CHLORIDE, SODIUM PHOSPHATE, DIBASIC, AND POTASSIUM PHOSPHATE 100 ML/HR: .53; .5; .37; .037; .03; .012; .00082 INJECTION, SOLUTION INTRAVENOUS at 00:47

## 2021-03-01 RX ADMIN — HYDROMORPHONE HYDROCHLORIDE 0.5 MG: 1 INJECTION, SOLUTION INTRAMUSCULAR; INTRAVENOUS; SUBCUTANEOUS at 04:07

## 2021-03-01 RX ADMIN — HYDROMORPHONE HYDROCHLORIDE 0.5 MG: 1 INJECTION, SOLUTION INTRAMUSCULAR; INTRAVENOUS; SUBCUTANEOUS at 16:41

## 2021-03-01 RX ADMIN — SODIUM CHLORIDE, SODIUM GLUCONATE, SODIUM ACETATE, POTASSIUM CHLORIDE, MAGNESIUM CHLORIDE, SODIUM PHOSPHATE, DIBASIC, AND POTASSIUM PHOSPHATE 100 ML/HR: .53; .5; .37; .037; .03; .012; .00082 INJECTION, SOLUTION INTRAVENOUS at 08:32

## 2021-03-01 RX ADMIN — ONDANSETRON 4 MG: 2 INJECTION INTRAMUSCULAR; INTRAVENOUS at 12:55

## 2021-03-01 RX ADMIN — HYDROMORPHONE HYDROCHLORIDE 0.5 MG: 1 INJECTION, SOLUTION INTRAMUSCULAR; INTRAVENOUS; SUBCUTANEOUS at 08:33

## 2021-03-01 RX ADMIN — PANTOPRAZOLE SODIUM 40 MG: 40 INJECTION, POWDER, FOR SOLUTION INTRAVENOUS at 20:40

## 2021-03-01 RX ADMIN — HYDROMORPHONE HYDROCHLORIDE 0.5 MG: 1 INJECTION, SOLUTION INTRAMUSCULAR; INTRAVENOUS; SUBCUTANEOUS at 20:41

## 2021-03-01 NOTE — ASSESSMENT & PLAN NOTE
· Reported early satiety and intermittent emesis s/p PO intake a couple of times per week x1 year   · CT C/A: "Mild gastric outlet obstruction "  · NGT in place to decompress abdomen, continue, still with > 900 cc output in the last 24 hrs   · Continue with Protonix 40mg BID IV  · GI on board - for eventual EGD once decompressed appropriately  · will repeat CT abdomen if no improvement tomorrow

## 2021-03-01 NOTE — CASE MANAGEMENT
LOS3  Not a bundle  Green readmission score of 8    Met with patient to discuss CM role and dcp  Pt lives at [facility] group home ([facility]) in 1st floor apartment by himself  This is an independent living facility  (s/w Livia at [facility] to confirm same) He  Just moved out of quarantine 2nd COVID protocol and moved in to his apartment the day prior to admission to hospital  Independent ADLs and ambulation  No DME  No prior homecare and inpatient rehab  Pt declined to respond to questions regarding mental health treatment  Pt denies h/o substance abuse treatment in past  PCP: Dr Allen at the [facility]  [facility] Pizarro is [facility]  Will follow for any dc needs  EGD [employer] from [facility] [employer] will transport home and they request 24h notice of same   [phone]

## 2021-03-01 NOTE — ASSESSMENT & PLAN NOTE
· [facility] [place]  · Reported intake of Adderall 30mg BID (confirmed with PDMP) and Abilify of unknown dose through [facility]   · Meds held while NPO

## 2021-03-01 NOTE — PROGRESS NOTES
Erwin Ochoa  [id number]    63 y o   male      ASSESSMENT and PLAN    Epigastric pain/abnormal CT scan of the stomach and duodenum - 63year-old gentleman with history of colonic diverticulosis who has been having progressively worsening abdominal pain over the past year   Tolerating only small meals   Now with CT scan suggesting partial gastric outlet obstruction and possible diverticula of the duodenum   The story is most consistent with progressive peptic ulcer disease causing gastric outlet obstruction   Rule out neoplasm  Duodenal diverticulosis does not necessarily cause symptoms or obstruction and there is not clear evidence of duodenal diverticulitis that might   He could have an intraluminal bezoar that is partially obstructing the lumen in either the stomach duodenum/diverticula or both     · Continue conservative management with PPI  · NGT to decompress the stomach  · If no improvement would consider repeat CT to assess for any sign of duodenal diverticulitis  · Once decompressed will plan EGD to evaluate for ulcer, stricture, beazor or malignancy    Chief Complaint   Patient presents with    Chest Pain       SUBJECTIVE/HPI   still with epigastric pain but decreased  No nausea or vomiting  No bowel movements but passing some flatus  Still with significant NG aspirate      /67   Pulse 70   Temp 98 °F (36 7 °C)   Resp 16   Ht 6' 4" (1 93 m)   Wt 102 kg (225 lb 3 2 oz)   SpO2 95%   BMI 27 41 kg/m²     PHYSICALEXAM  General appearance: alert, appears stated age and cooperative  Head: Normocephalic, without obvious abnormality, atraumatic and G-tube with copious brown NG drainage  Lungs: clear to auscultation bilaterally  Heart: regular rate and rhythm, S1, S2 normal, no murmur, click, rub or gallop  Abdomen: soft, non-tender; bowel sounds normal; no masses,  no organomegaly  Extremities: extremities normal, atraumatic, no cyanosis or edema  Neurologic: Grossly normal    Lab Results Component Value Date    CALCIUM 8 6 03/01/2021    K 3 8 03/01/2021    CO2 26 03/01/2021     03/01/2021    BUN 17 03/01/2021    CREATININE 0 90 03/01/2021     Lab Results   Component Value Date    WBC 8 27 03/01/2021    HGB 15 1 03/01/2021    HCT 44 5 03/01/2021    MCV 94 03/01/2021     03/01/2021     Lab Results   Component Value Date    ALT 24 02/26/2021    AST 25 02/26/2021    ALKPHOS 112 02/26/2021     No results found for: AMYLASE  No results found for: LIPASE  No results found for: IRON, TIBC, FERRITIN  No results found for: INR    Counseling / Coordination of Care  Total floor / unit time spent today 20 minutes

## 2021-03-01 NOTE — PROGRESS NOTES
Progress Note - Tatiana Castañeda 1955, 63 y o  male MRN: [id number]    Unit/Bed#: -01 Encounter: [id number]    Primary Care Provider: No primary care provider on file  Date and time admitted to hospital: 2/26/2021 10:56 PM        * Gastric outlet obstruction  Assessment & Plan  · Reported early satiety and intermittent emesis s/p PO intake a couple of times per week x1 year   · CT C/A: "Mild gastric outlet obstruction "  · NGT in place to decompress abdomen, continue, still with > 900 cc output in the last 24 hrs   · Continue with Protonix 40mg BID IV  · GI on board - for eventual EGD once decompressed appropriately  · will repeat CT abdomen if no improvement tomorrow     History of traumatic brain injury  Assessment & Plan  · [facility] [place]  · Reported intake of Adderall 30mg BID (confirmed with PDMP) and Abilify of unknown dose through [facility]   · Meds held while NPO    HLD (hyperlipidemia)  Assessment & Plan  · Home regimen: Atorvastatin 40 mg daily   · Currently NPO, NG tube in place, meds held    Duodenal diverticulum  Assessment & Plan  Incidental finding of duodenal diverticulum in the 3rd portion seen on CT scan A/P causing narrowing of the duodenum  Would focus on mgt for GOO for now  If no improvement with conservative mgt, would repeat CT scan A/p        VTE Pharmacologic Prophylaxis:   Pharmacologic: Enoxaparin (Lovenox)  Mechanical VTE Prophylaxis in Place: Yes    Patient Centered Rounds: I have performed bedside rounds with nursing staff today  Discussions with Specialists or Other Care Team Provider:     Education and Discussions with Family / Patient:  Patient, declined family communication    Time Spent for Care: 30 minutes  More than 50% of total time spent on counseling and coordination of care as described above      Current Length of Stay: 2 day(s)    Current Patient Status: Inpatient   Certification Statement: The patient will continue to require additional inpatient hospital stay due to Ongoing treatment for gastric outlet obstruction    Discharge Plan:     Code Status: Level 1 - Full Code      Subjective:   Seen this morning  No overnight events reported  Has no new complaints to offer  Denies nausea abdominal pain  Objective:     Vitals:   Temp (24hrs), Av 9 °F (36 6 °C), Min:97 6 °F (36 4 °C), Max:98 2 °F (36 8 °C)    Temp:  [97 6 °F (36 4 °C)-98 2 °F (36 8 °C)] 98 2 °F (36 8 °C)  HR:  [69-70] 69  Resp:  [16-18] 16  BP: (118-127)/(65-83) 118/65  SpO2:  [93 %-95 %] 93 %  Body mass index is 27 41 kg/m²  Input and Output Summary (last 24 hours): Intake/Output Summary (Last 24 hours) at 3/1/2021 1606  Last data filed at 3/1/2021 1401  Gross per 24 hour   Intake 1000 ml   Output 2100 ml   Net -1100 ml       Physical Exam:     Physical Exam  Vitals signs reviewed  Constitutional:       General: He is not in acute distress  Appearance: Normal appearance  He is not ill-appearing, toxic-appearing or diaphoretic  HENT:      Head: Normocephalic  Eyes:      Extraocular Movements: Extraocular movements intact  Pupils: Pupils are equal, round, and reactive to light  Neck:      Musculoskeletal: Normal range of motion and neck supple  No neck rigidity or muscular tenderness  Vascular: No carotid bruit  Cardiovascular:      Rate and Rhythm: Normal rate and regular rhythm  Pulses: Normal pulses  Heart sounds: Normal heart sounds  No murmur  No friction rub  Pulmonary:      Effort: Pulmonary effort is normal       Breath sounds: Normal breath sounds  No stridor  No wheezing, rhonchi or rales  Chest:      Chest wall: No tenderness  Abdominal:      General: Abdomen is flat  Bowel sounds are normal  There is distension  Palpations: Abdomen is soft  Tenderness: There is no abdominal tenderness  There is no guarding or rebound  Comments: NG tube draining brownish liquid  Musculoskeletal: Normal range of motion  General: No swelling or tenderness  Right lower leg: No edema  Left lower leg: No edema  Skin:     General: Skin is warm and dry  Coloration: Skin is not jaundiced  Neurological:      General: No focal deficit present  Mental Status: He is alert and oriented to person, place, and time  Cranial Nerves: No cranial nerve deficit  Sensory: No sensory deficit  Additional Data:     Labs:    Results from last 7 days   Lab Units 03/01/21  0530  02/26/21  2324   WBC Thousand/uL 8 27   < > 8 53   HEMOGLOBIN g/dL 15 1   < > 13 6   HEMATOCRIT % 44 5   < > 41 5   PLATELETS Thousands/uL 220   < > 230   NEUTROS PCT %  --   --  44   LYMPHS PCT %  --   --  38   MONOS PCT %  --   --  11   EOS PCT %  --   --  6    < > = values in this interval not displayed  Results from last 7 days   Lab Units 03/01/21  0530  02/26/21  2324   SODIUM mmol/L 136   < > 138   POTASSIUM mmol/L 3 8   < > 3 9   CHLORIDE mmol/L 101   < > 106   CO2 mmol/L 26   < > 23   BUN mg/dL 17   < > 17   CREATININE mg/dL 0 90   < > 1 12   ANION GAP mmol/L 9   < > 9   CALCIUM mg/dL 8 6   < > 8 4   ALBUMIN g/dL  --   --  3 1*   TOTAL BILIRUBIN mg/dL  --   --  0 40   ALK PHOS U/L  --   --  112   ALT U/L  --   --  24   AST U/L  --   --  25   GLUCOSE RANDOM mg/dL 89   < > 165*    < > = values in this interval not displayed  * I Have Reviewed All Lab Data Listed Above  * Additional Pertinent Lab Tests Reviewed:  All Labs Within Last 24 Hours Reviewed    Imaging:    Imaging Reports Reviewed Today Include:   Imaging Personally Reviewed by Myself Includes:      Recent Cultures (last 7 days):           Last 24 Hours Medication List:   Current Facility-Administered Medications   Medication Dose Route Frequency Provider Last Rate    HYDROmorphone  0 5 mg Intravenous Q4H PRN Clearnce Spurling, MD      multi-electrolyte  125 mL/hr Intravenous Continuous Kim Meadows  mL/hr (03/01/21 1052)    ondansetron  4 mg Intravenous Q6H PRN Ezequiel Hawkins PA-C      pantoprazole  40 mg Intravenous Q12H 87 Shanika Randhawa PA-C          Today, Patient Was Seen By: Ghada Killian MD    ** Please Note: Dictation voice to text software may have been used in the creation of this document   **

## 2021-03-02 LAB
ANION GAP SERPL CALCULATED.3IONS-SCNC: 10 MMOL/L (ref 4–13)
BUN SERPL-MCNC: 17 MG/DL (ref 5–25)
CALCIUM SERPL-MCNC: 8.3 MG/DL (ref 8.3–10.1)
CHLORIDE SERPL-SCNC: 100 MMOL/L (ref 100–108)
CO2 SERPL-SCNC: 25 MMOL/L (ref 21–32)
CREAT SERPL-MCNC: 0.94 MG/DL (ref 0.6–1.3)
ERYTHROCYTE [DISTWIDTH] IN BLOOD BY AUTOMATED COUNT: 11.9 % (ref 11.6–15.1)
GFR SERPL CREATININE-BSD FRML MDRD: 86 ML/MIN/1.73SQ M
GLUCOSE SERPL-MCNC: 84 MG/DL (ref 65–140)
HCT VFR BLD AUTO: 43.5 % (ref 36.5–49.3)
HGB BLD-MCNC: 14.9 G/DL (ref 12–17)
MCH RBC QN AUTO: 31.8 PG (ref 26.8–34.3)
MCHC RBC AUTO-ENTMCNC: 34.3 G/DL (ref 31.4–37.4)
MCV RBC AUTO: 93 FL (ref 82–98)
PLATELET # BLD AUTO: 205 THOUSANDS/UL (ref 149–390)
PMV BLD AUTO: 10.8 FL (ref 8.9–12.7)
POTASSIUM SERPL-SCNC: 4 MMOL/L (ref 3.5–5.3)
RBC # BLD AUTO: 4.69 MILLION/UL (ref 3.88–5.62)
SODIUM SERPL-SCNC: 135 MMOL/L (ref 136–145)
WBC # BLD AUTO: 8.94 THOUSAND/UL (ref 4.31–10.16)

## 2021-03-02 PROCEDURE — 85027 COMPLETE CBC AUTOMATED: CPT | Performed by: INTERNAL MEDICINE

## 2021-03-02 PROCEDURE — 80048 BASIC METABOLIC PNL TOTAL CA: CPT | Performed by: INTERNAL MEDICINE

## 2021-03-02 PROCEDURE — 99232 SBSQ HOSP IP/OBS MODERATE 35: CPT | Performed by: INTERNAL MEDICINE

## 2021-03-02 PROCEDURE — C9113 INJ PANTOPRAZOLE SODIUM, VIA: HCPCS | Performed by: PHYSICIAN ASSISTANT

## 2021-03-02 RX ORDER — HYDROMORPHONE HCL/PF 1 MG/ML
0.5 SYRINGE (ML) INJECTION
Status: DISCONTINUED | OUTPATIENT
Start: 2021-03-02 | End: 2021-03-04

## 2021-03-02 RX ADMIN — HYDROMORPHONE HYDROCHLORIDE 0.5 MG: 1 INJECTION, SOLUTION INTRAMUSCULAR; INTRAVENOUS; SUBCUTANEOUS at 23:30

## 2021-03-02 RX ADMIN — HYDROMORPHONE HYDROCHLORIDE 0.5 MG: 1 INJECTION, SOLUTION INTRAMUSCULAR; INTRAVENOUS; SUBCUTANEOUS at 04:53

## 2021-03-02 RX ADMIN — SODIUM CHLORIDE, SODIUM GLUCONATE, SODIUM ACETATE, POTASSIUM CHLORIDE, MAGNESIUM CHLORIDE, SODIUM PHOSPHATE, DIBASIC, AND POTASSIUM PHOSPHATE 125 ML/HR: .53; .5; .37; .037; .03; .012; .00082 INJECTION, SOLUTION INTRAVENOUS at 00:47

## 2021-03-02 RX ADMIN — ONDANSETRON 4 MG: 2 INJECTION INTRAMUSCULAR; INTRAVENOUS at 13:09

## 2021-03-02 RX ADMIN — ONDANSETRON 4 MG: 2 INJECTION INTRAMUSCULAR; INTRAVENOUS at 08:18

## 2021-03-02 RX ADMIN — HYDROMORPHONE HYDROCHLORIDE 0.5 MG: 1 INJECTION, SOLUTION INTRAMUSCULAR; INTRAVENOUS; SUBCUTANEOUS at 20:34

## 2021-03-02 RX ADMIN — Medication 1 SPRAY: at 16:30

## 2021-03-02 RX ADMIN — HYDROMORPHONE HYDROCHLORIDE 0.5 MG: 1 INJECTION, SOLUTION INTRAMUSCULAR; INTRAVENOUS; SUBCUTANEOUS at 00:44

## 2021-03-02 RX ADMIN — HYDROMORPHONE HYDROCHLORIDE 0.5 MG: 1 INJECTION, SOLUTION INTRAMUSCULAR; INTRAVENOUS; SUBCUTANEOUS at 08:18

## 2021-03-02 RX ADMIN — HYDROMORPHONE HYDROCHLORIDE 0.5 MG: 1 INJECTION, SOLUTION INTRAMUSCULAR; INTRAVENOUS; SUBCUTANEOUS at 17:30

## 2021-03-02 RX ADMIN — HYDROMORPHONE HYDROCHLORIDE 0.5 MG: 1 INJECTION, SOLUTION INTRAMUSCULAR; INTRAVENOUS; SUBCUTANEOUS at 14:16

## 2021-03-02 RX ADMIN — HYDROMORPHONE HYDROCHLORIDE 0.5 MG: 1 INJECTION, SOLUTION INTRAMUSCULAR; INTRAVENOUS; SUBCUTANEOUS at 13:09

## 2021-03-02 RX ADMIN — PANTOPRAZOLE SODIUM 40 MG: 40 INJECTION, POWDER, FOR SOLUTION INTRAVENOUS at 08:18

## 2021-03-02 RX ADMIN — PANTOPRAZOLE SODIUM 40 MG: 40 INJECTION, POWDER, FOR SOLUTION INTRAVENOUS at 20:34

## 2021-03-02 RX ADMIN — ENOXAPARIN SODIUM 40 MG: 40 INJECTION SUBCUTANEOUS at 08:19

## 2021-03-02 NOTE — ASSESSMENT & PLAN NOTE
Incidental finding of duodenal diverticulum in the 3rd portion seen on CT scan A/P causing narrowing of the duodenum  For EGD tomorrow

## 2021-03-02 NOTE — ASSESSMENT & PLAN NOTE
· Reported early satiety and intermittent emesis s/p PO intake a couple of times per week x1 year   · CT C/A: "Mild gastric outlet obstruction "  · NGT in place to decompress abdomen, continue, still with significant output in the last 24 hrs   · Continue with Protonix 40mg BID IV  · GI on board - for EGD tomorrow

## 2021-03-02 NOTE — PLAN OF CARE
Problem: PAIN - ADULT  Goal: Verbalizes/displays adequate comfort level or baseline comfort level  Description: Interventions:  - Encourage patient to monitor pain and request assistance  - Assess pain using appropriate pain scale  - Administer analgesics based on type and severity of pain and evaluate response  - Implement non-pharmacological measures as appropriate and evaluate response  - Consider cultural and social influences on pain and pain management  - Notify physician/advanced practitioner if interventions unsuccessful or patient reports new pain  Outcome: Progressing     Problem: SAFETY ADULT  Goal: Patient will remain free of falls  Description: INTERVENTIONS:  - Assess patient frequently for physical needs  -  Identify cognitive and physical deficits and behaviors that affect risk of falls    -  [place] fall precautions as indicated by assessment   - Educate patient/family on patient safety including physical limitations  - Instruct patient to call for assistance with activity based on assessment  - Modify environment to reduce risk of injury  - Consider OT/PT consult to assist with strengthening/mobility  Outcome: Progressing  Goal: Maintain or return to baseline ADL function  Description: INTERVENTIONS:  -  Assess patient's ability to carry out ADLs; assess patient's baseline for ADL function and identify physical deficits which impact ability to perform ADLs (bathing, care of mouth/teeth, toileting, grooming, dressing, etc )  - Assess/evaluate cause of self-care deficits   - Assess range of motion  - Assess patient's mobility; develop plan if impaired  - Assess patient's need for assistive devices and provide as appropriate  - Encourage maximum independence but intervene and supervise when necessary  - Involve family in performance of ADLs  - Assess for home care needs following discharge   - Consider OT consult to assist with ADL evaluation and planning for discharge  - Provide patient education as appropriate  Outcome: Progressing  Goal: Maintain or return mobility status to optimal level  Description: INTERVENTIONS:  - Assess patient's baseline mobility status (ambulation, transfers, stairs, etc )    - Identify cognitive and physical deficits and behaviors that affect mobility  - Identify mobility aids required to assist with transfers and/or ambulation (gait belt, sit-to-stand, lift, walker, cane, etc )  - [place] fall precautions as indicated by assessment  - Record patient progress and toleration of activity level on Mobility SBAR; progress patient to next Phase/Stage  - Instruct patient to call for assistance with activity based on assessment  - Consider rehabilitation consult to assist with strengthening/weightbearing, etc   Outcome: Progressing     Problem: Nutrition  Goal: Nutrition/Hydration status is improving  Description: Monitor and assess patient's nutrition/hydration status for malnutrition (ex- brittle hair, bruises, dry skin, pale skin and conjunctiva, muscle wasting, smooth red tongue, and disorientation)  Collaborate with interdisciplinary team and initiate plan and interventions as ordered  Monitor patient's weight and dietary intake as ordered or per policy  Utilize nutrition screening tool and intervene per policy  Determine patient's food preferences and provide high-protein, high-caloric foods as appropriate  - Assist patient with eating   - Allow adequate time for meals   - Encourage patient to take dietary supplement as ordered  - Collaborate with clinical nutritionist   - Include patient/family/caregiver in decisions related to nutrition    Outcome: Progressing     Problem: GASTROINTESTINAL - ADULT  Goal: Minimal or absence of nausea and/or vomiting  Description: INTERVENTIONS:  - Administer IV fluids if ordered to ensure adequate hydration  - Maintain NPO status until nausea and vomiting are resolved  - Nasogastric tube if ordered  - Administer ordered antiemetic medications as needed  - Provide nonpharmacologic comfort measures as appropriate  - Advance diet as tolerated, if ordered  - Consider nutrition services referral to assist patient with adequate nutrition and appropriate food choices  Outcome: Progressing  Goal: Maintains or returns to baseline bowel function  Description: INTERVENTIONS:  - Assess bowel function  - Encourage oral fluids to ensure adequate hydration  - Administer IV fluids if ordered to ensure adequate hydration  - Administer ordered medications as needed  - Encourage mobilization and activity  - Consider nutritional services referral to assist patient with adequate nutrition and appropriate food choices  Outcome: Progressing  Goal: Maintains adequate nutritional intake  Description: INTERVENTIONS:  - Monitor percentage of each meal consumed  - Identify factors contributing to decreased intake, treat as appropriate  - Assist with meals as needed  - Monitor I&O, weight, and lab values if indicated  - Obtain nutrition services referral as needed  Outcome: Progressing  Goal: Establish and maintain optimal ostomy function  Description: INTERVENTIONS:  - Assess bowel function  - Encourage oral fluids to ensure adequate hydration  - Administer IV fluids if ordered to ensure adequate hydration   - Administer ordered medications as needed  - Encourage mobilization and activity  - Nutrition services referral to assist patient with appropriate food choices  - Assess stoma site  - Consider wound care consult   Outcome: Progressing     Problem: Nutrition/Hydration-ADULT  Goal: Nutrient/Hydration intake appropriate for improving, restoring or maintaining nutritional needs  Description: Monitor and assess patient's nutrition/hydration status for malnutrition  Collaborate with interdisciplinary team and initiate plan and interventions as ordered  Monitor patient's weight and dietary intake as ordered or per policy   Utilize nutrition screening tool and intervene as necessary  Determine patient's food preferences and provide high-protein, high-caloric foods as appropriate  INTERVENTIONS:  - Monitor oral intake, urinary output, labs, and treatment plans  - Assess nutrition and hydration status and recommend course of action  - Evaluate amount of meals eaten  - Assist patient with eating if necessary   - Allow adequate time for meals  - Recommend/ encourage appropriate diets, oral nutritional supplements, and vitamin/mineral supplements  - Order, calculate, and assess calorie counts as needed  - Recommend, monitor, and adjust tube feedings and TPN/PPN based on assessed needs  - Assess need for intravenous fluids  - Provide specific nutrition/hydration education as appropriate  - Include patient/family/caregiver in decisions related to nutrition  Outcome: Progressing     Problem: Prexisting or High Potential for Compromised Skin Integrity  Goal: Skin integrity is maintained or improved  Description: INTERVENTIONS:  - Identify patients at risk for skin breakdown  - Assess and monitor skin integrity  - Assess and monitor nutrition and hydration status  - Monitor labs   - Assess for incontinence   - Turn and reposition patient  - Assist with mobility/ambulation  - Relieve pressure over bony prominences  - Avoid friction and shearing  - Provide appropriate hygiene as needed including keeping skin clean and dry  - Evaluate need for skin moisturizer/barrier cream  - Collaborate with interdisciplinary team   - Patient/family teaching  - Consider wound care consult   Outcome: Progressing     Problem: Potential for Falls  Goal: Patient will remain free of falls  Description: INTERVENTIONS:  - Assess patient frequently for physical needs  -  Identify cognitive and physical deficits and behaviors that affect risk of falls    -  [place] fall precautions as indicated by assessment   - Educate patient/family on patient safety including physical limitations  - Instruct patient to call for assistance with activity based on assessment  - Modify environment to reduce risk of injury  - Consider OT/PT consult to assist with strengthening/mobility  Outcome: Progressing

## 2021-03-02 NOTE — PROGRESS NOTES
Progress note - Gastroenterology   Sherri Dennis 63 y o  male MRN: [id number]  Unit/Bed#: -01 Encounter: [id number]    ASSESSMENT and PLAN    Epigastric pain/abnormal CT scan of the stomach and duodenum - 63year-old gentleman with history of colonic diverticulosis who has been having progressively worsening abdominal pain over the past year  Now CT scan suggesting partial gastric outlet obstruction and possible diverticula of the duodenum   The story is most consistent with progressive peptic ulcer disease causing gastric outlet obstruction   Rule out neoplasm  Fact that he has not lost significant weight makes me think a neoplasm is less likely  · continue conservative management with PPI  · NGT to decompress the stomach  · plan EGD to evaluate for ulcer, stricture, beazor or malignancy tomorrow March 3rd    Chief Complaint   Patient presents with    Chest Pain       SUBJECTIVE/HPI   Feeling okay  NG tube is helping him  Some gas from below      /54   Pulse 67   Temp 97 8 °F (36 6 °C)   Resp 17   Ht 6' 4" (1 93 m)   Wt 102 kg (225 lb 3 2 oz)   SpO2 92%   BMI 27 41 kg/m²     PHYSICALEXAM  General appearance: alert, appears stated age and cooperative  Eyes: PERLLA, EOMI, no icterus   Head: Normocephalic, without obvious abnormality, atraumatic  Abdomen: soft, non-tender; bowel sounds normal; no masses,  no organomegaly  Extremities: extremities normal, atraumatic, no cyanosis or edema  Neurologic: Grossly normal    Lab Results   Component Value Date    CALCIUM 8 3 03/02/2021    K 4 0 03/02/2021    CO2 25 03/02/2021     03/02/2021    BUN 17 03/02/2021    CREATININE 0 94 03/02/2021     Lab Results   Component Value Date    WBC 8 94 03/02/2021    HGB 14 9 03/02/2021    HCT 43 5 03/02/2021    MCV 93 03/02/2021     03/02/2021     Lab Results   Component Value Date    ALT 24 02/26/2021    AST 25 02/26/2021    ALKPHOS 112 02/26/2021     No results found for: AMYLASE  No results found for:  LIPASE  No results found for: IRON, TIBC, FERRITIN  No results found for: INR

## 2021-03-02 NOTE — PROGRESS NOTES
Progress Note - Alfonzo Chua 1955, 63 y o  male MRN: [id number]    Unit/Bed#: -01 Encounter: [id number]    Primary Care Provider: No primary care provider on file  Date and time admitted to hospital: 2/26/2021 10:56 PM        * Gastric outlet obstruction  Assessment & Plan  · Reported early satiety and intermittent emesis s/p PO intake a couple of times per week x1 year   · CT C/A: "Mild gastric outlet obstruction "  · NGT in place to decompress abdomen, continue, still with significant output in the last 24 hrs   · Continue with Protonix 40mg BID IV  · GI on board - for EGD tomorrow       History of traumatic brain injury  Assessment & Plan  · [facility] [place]  · Reported intake of Adderall 30mg BID (confirmed with PDMP) and Abilify of unknown dose through [place]   · Meds held while NPO    HLD (hyperlipidemia)  Assessment & Plan  · Home regimen: Atorvastatin 40 mg daily   · Currently NPO, NG tube in place, meds held    Duodenal diverticulum  Assessment & Plan  Incidental finding of duodenal diverticulum in the 3rd portion seen on CT scan A/P causing narrowing of the duodenum  For EGD tomorrow      VTE Pharmacologic Prophylaxis:   Pharmacologic: Enoxaparin (Lovenox)  Mechanical VTE Prophylaxis in Place: No    Patient Centered Rounds: I have performed bedside rounds with nursing staff today  Discussions with Specialists or Other Care Team Provider:     Education and Discussions with Family / Patient: patient, phone number in chart is not for his spouse  Will get the number and attempt to reach his spouse    Time Spent for Care: 30 minutes  More than 50% of total time spent on counseling and coordination of care as described above  Current Length of Stay: 3 day(s)    Current Patient Status: Inpatient   Certification Statement: The patient will continue to require additional inpatient hospital stay due to NPO status for gastric outlet obstruction pending EGD tomorrow      Discharge Plan:     Code Status: Level 1 - Full Code      Subjective:   Seen this morning  Overnight had abdominal pain  States improved this morning  No worsening shortness of breath or chest pain  NG tube with significant amount of output, 100 cc in the last 24 hours    Objective:     Vitals:   Temp (24hrs), Av 1 °F (36 7 °C), Min:97 8 °F (36 6 °C), Max:98 2 °F (36 8 °C)    Temp:  [97 8 °F (36 6 °C)-98 2 °F (36 8 °C)] 97 8 °F (36 6 °C)  HR:  [67-69] 67  Resp:  [16-18] 17  BP: (106-118)/(54-65) 110/54  SpO2:  [92 %-95 %] 92 %  Body mass index is 27 41 kg/m²  Input and Output Summary (last 24 hours): Intake/Output Summary (Last 24 hours) at 3/2/2021 1150  Last data filed at 3/2/2021 0255  Gross per 24 hour   Intake --   Output 1375 ml   Net -1375 ml       Physical Exam:     Physical Exam  Vitals signs reviewed  Constitutional:       General: He is not in acute distress  Appearance: Normal appearance  He is not ill-appearing, toxic-appearing or diaphoretic  HENT:      Head: Normocephalic  Eyes:      Extraocular Movements: Extraocular movements intact  Pupils: Pupils are equal, round, and reactive to light  Neck:      Musculoskeletal: Normal range of motion and neck supple  No neck rigidity or muscular tenderness  Vascular: No carotid bruit  Cardiovascular:      Rate and Rhythm: Normal rate and regular rhythm  Pulses: Normal pulses  Heart sounds: Normal heart sounds  No murmur  No friction rub  Pulmonary:      Effort: Pulmonary effort is normal       Breath sounds: Normal breath sounds  No stridor  No wheezing, rhonchi or rales  Chest:      Chest wall: No tenderness  Abdominal:      General: Bowel sounds are normal  There is no distension  Palpations: Abdomen is soft  Tenderness: There is no abdominal tenderness  There is no guarding or rebound  Comments: NG tube draining brownish material   Musculoskeletal: Normal range of motion           General: No swelling or tenderness  Right lower leg: No edema  Left lower leg: No edema  Skin:     General: Skin is warm and dry  Coloration: Skin is not jaundiced  Neurological:      General: No focal deficit present  Mental Status: He is alert and oriented to person, place, and time  Cranial Nerves: No cranial nerve deficit  Sensory: No sensory deficit  Additional Data:     Labs:    Results from last 7 days   Lab Units 03/02/21  0500  02/26/21  2324   WBC Thousand/uL 8 94   < > 8 53   HEMOGLOBIN g/dL 14 9   < > 13 6   HEMATOCRIT % 43 5   < > 41 5   PLATELETS Thousands/uL 205   < > 230   NEUTROS PCT %  --   --  44   LYMPHS PCT %  --   --  38   MONOS PCT %  --   --  11   EOS PCT %  --   --  6    < > = values in this interval not displayed  Results from last 7 days   Lab Units 03/02/21  0500  02/26/21  2324   SODIUM mmol/L 135*   < > 138   POTASSIUM mmol/L 4 0   < > 3 9   CHLORIDE mmol/L 100   < > 106   CO2 mmol/L 25   < > 23   BUN mg/dL 17   < > 17   CREATININE mg/dL 0 94   < > 1 12   ANION GAP mmol/L 10   < > 9   CALCIUM mg/dL 8 3   < > 8 4   ALBUMIN g/dL  --   --  3 1*   TOTAL BILIRUBIN mg/dL  --   --  0 40   ALK PHOS U/L  --   --  112   ALT U/L  --   --  24   AST U/L  --   --  25   GLUCOSE RANDOM mg/dL 84   < > 165*    < > = values in this interval not displayed  * I Have Reviewed All Lab Data Listed Above  * Additional Pertinent Lab Tests Reviewed:  All Labs Within Last 24 Hours Reviewed    Imaging:    Imaging Reports Reviewed Today Include:   Imaging Personally Reviewed by Myself Includes:      Recent Cultures (last 7 days):           Last 24 Hours Medication List:   Current Facility-Administered Medications   Medication Dose Route Frequency Provider Last Rate    enoxaparin  40 mg Subcutaneous Q24H [facility] Donte Mayorga MD      HYDROmorphone  0 5 mg Intravenous Q4H ARIK Cabrera MD      multi-electrolyte  125 mL/hr Intravenous Continuous Saman Dean  mL/hr (03/02/21 0047)    ondansetron  4 mg Intravenous Q6H PRN Dereck Gillespie PA-C      pantoprazole  40 mg Intravenous Q12H 87 Shanika Randhawa PA-C          Today, Patient Was Seen By: Saman Dean MD    ** Please Note: Dictation voice to text software may have been used in the creation of this document   **

## 2021-03-03 ENCOUNTER — ANESTHESIA EVENT (INPATIENT)
Dept: GASTROENTEROLOGY | Facility: HOSPITAL | Age: 66
DRG: 382 | End: 2021-03-03
Payer: COMMERCIAL

## 2021-03-03 ENCOUNTER — APPOINTMENT (INPATIENT)
Dept: GASTROENTEROLOGY | Facility: HOSPITAL | Age: 66
DRG: 382 | End: 2021-03-03
Attending: INTERNAL MEDICINE
Payer: COMMERCIAL

## 2021-03-03 ENCOUNTER — ANESTHESIA (INPATIENT)
Dept: GASTROENTEROLOGY | Facility: HOSPITAL | Age: 66
DRG: 382 | End: 2021-03-03
Payer: COMMERCIAL

## 2021-03-03 LAB
ANION GAP SERPL CALCULATED.3IONS-SCNC: 8 MMOL/L (ref 4–13)
BUN SERPL-MCNC: 14 MG/DL (ref 5–25)
CALCIUM SERPL-MCNC: 8.7 MG/DL (ref 8.3–10.1)
CHLORIDE SERPL-SCNC: 99 MMOL/L (ref 100–108)
CO2 SERPL-SCNC: 28 MMOL/L (ref 21–32)
CREAT SERPL-MCNC: 0.88 MG/DL (ref 0.6–1.3)
ERYTHROCYTE [DISTWIDTH] IN BLOOD BY AUTOMATED COUNT: 11.9 % (ref 11.6–15.1)
GFR SERPL CREATININE-BSD FRML MDRD: 91 ML/MIN/1.73SQ M
GLUCOSE SERPL-MCNC: 82 MG/DL (ref 65–140)
HCT VFR BLD AUTO: 43.8 % (ref 36.5–49.3)
HGB BLD-MCNC: 15 G/DL (ref 12–17)
MCH RBC QN AUTO: 31.8 PG (ref 26.8–34.3)
MCHC RBC AUTO-ENTMCNC: 34.2 G/DL (ref 31.4–37.4)
MCV RBC AUTO: 93 FL (ref 82–98)
PLATELET # BLD AUTO: 207 THOUSANDS/UL (ref 149–390)
PMV BLD AUTO: 10.8 FL (ref 8.9–12.7)
POTASSIUM SERPL-SCNC: 4.5 MMOL/L (ref 3.5–5.3)
RBC # BLD AUTO: 4.72 MILLION/UL (ref 3.88–5.62)
SODIUM SERPL-SCNC: 135 MMOL/L (ref 136–145)
WBC # BLD AUTO: 7.74 THOUSAND/UL (ref 4.31–10.16)

## 2021-03-03 PROCEDURE — 80048 BASIC METABOLIC PNL TOTAL CA: CPT | Performed by: INTERNAL MEDICINE

## 2021-03-03 PROCEDURE — 0DB78ZX EXCISION OF STOMACH, PYLORUS, VIA NATURAL OR ARTIFICIAL OPENING ENDOSCOPIC, DIAGNOSTIC: ICD-10-PCS | Performed by: INTERNAL MEDICINE

## 2021-03-03 PROCEDURE — 88305 TISSUE EXAM BY PATHOLOGIST: CPT | Performed by: PATHOLOGY

## 2021-03-03 PROCEDURE — NC001 PR NO CHARGE: Performed by: INTERNAL MEDICINE

## 2021-03-03 PROCEDURE — 99232 SBSQ HOSP IP/OBS MODERATE 35: CPT | Performed by: INTERNAL MEDICINE

## 2021-03-03 PROCEDURE — 43239 EGD BIOPSY SINGLE/MULTIPLE: CPT | Performed by: INTERNAL MEDICINE

## 2021-03-03 PROCEDURE — C9113 INJ PANTOPRAZOLE SODIUM, VIA: HCPCS | Performed by: PHYSICIAN ASSISTANT

## 2021-03-03 PROCEDURE — 85027 COMPLETE CBC AUTOMATED: CPT | Performed by: INTERNAL MEDICINE

## 2021-03-03 PROCEDURE — C9113 INJ PANTOPRAZOLE SODIUM, VIA: HCPCS | Performed by: INTERNAL MEDICINE

## 2021-03-03 RX ORDER — SODIUM CHLORIDE 9 MG/ML
INJECTION, SOLUTION INTRAVENOUS CONTINUOUS PRN
Status: DISCONTINUED | OUTPATIENT
Start: 2021-03-03 | End: 2021-03-03

## 2021-03-03 RX ORDER — PROPOFOL 10 MG/ML
INJECTION, EMULSION INTRAVENOUS AS NEEDED
Status: DISCONTINUED | OUTPATIENT
Start: 2021-03-03 | End: 2021-03-03

## 2021-03-03 RX ORDER — SODIUM CHLORIDE 9 MG/ML
75 INJECTION, SOLUTION INTRAVENOUS CONTINUOUS
Status: DISCONTINUED | OUTPATIENT
Start: 2021-03-03 | End: 2021-03-03

## 2021-03-03 RX ADMIN — PROPOFOL 200 MG: 10 INJECTION, EMULSION INTRAVENOUS at 11:07

## 2021-03-03 RX ADMIN — ONDANSETRON 4 MG: 2 INJECTION INTRAMUSCULAR; INTRAVENOUS at 09:13

## 2021-03-03 RX ADMIN — PANTOPRAZOLE SODIUM 40 MG: 40 INJECTION, POWDER, FOR SOLUTION INTRAVENOUS at 20:27

## 2021-03-03 RX ADMIN — SODIUM CHLORIDE, SODIUM GLUCONATE, SODIUM ACETATE, POTASSIUM CHLORIDE, MAGNESIUM CHLORIDE, SODIUM PHOSPHATE, DIBASIC, AND POTASSIUM PHOSPHATE 125 ML/HR: .53; .5; .37; .037; .03; .012; .00082 INJECTION, SOLUTION INTRAVENOUS at 09:13

## 2021-03-03 RX ADMIN — HYDROMORPHONE HYDROCHLORIDE 0.5 MG: 1 INJECTION, SOLUTION INTRAMUSCULAR; INTRAVENOUS; SUBCUTANEOUS at 09:12

## 2021-03-03 RX ADMIN — HYDROMORPHONE HYDROCHLORIDE 0.5 MG: 1 INJECTION, SOLUTION INTRAMUSCULAR; INTRAVENOUS; SUBCUTANEOUS at 16:58

## 2021-03-03 RX ADMIN — HYDROMORPHONE HYDROCHLORIDE 0.5 MG: 1 INJECTION, SOLUTION INTRAMUSCULAR; INTRAVENOUS; SUBCUTANEOUS at 06:17

## 2021-03-03 RX ADMIN — HYDROMORPHONE HYDROCHLORIDE 0.5 MG: 1 INJECTION, SOLUTION INTRAMUSCULAR; INTRAVENOUS; SUBCUTANEOUS at 03:09

## 2021-03-03 RX ADMIN — SODIUM CHLORIDE 75 ML/HR: 0.9 INJECTION, SOLUTION INTRAVENOUS at 12:10

## 2021-03-03 RX ADMIN — HYDROMORPHONE HYDROCHLORIDE 0.5 MG: 1 INJECTION, SOLUTION INTRAMUSCULAR; INTRAVENOUS; SUBCUTANEOUS at 20:27

## 2021-03-03 RX ADMIN — SODIUM CHLORIDE: 0.9 INJECTION, SOLUTION INTRAVENOUS at 10:45

## 2021-03-03 RX ADMIN — SODIUM CHLORIDE, SODIUM GLUCONATE, SODIUM ACETATE, POTASSIUM CHLORIDE, MAGNESIUM CHLORIDE, SODIUM PHOSPHATE, DIBASIC, AND POTASSIUM PHOSPHATE 125 ML/HR: .53; .5; .37; .037; .03; .012; .00082 INJECTION, SOLUTION INTRAVENOUS at 01:38

## 2021-03-03 RX ADMIN — ENOXAPARIN SODIUM 40 MG: 40 INJECTION SUBCUTANEOUS at 09:11

## 2021-03-03 RX ADMIN — PANTOPRAZOLE SODIUM 40 MG: 40 INJECTION, POWDER, FOR SOLUTION INTRAVENOUS at 09:13

## 2021-03-03 RX ADMIN — HYDROMORPHONE HYDROCHLORIDE 0.5 MG: 1 INJECTION, SOLUTION INTRAMUSCULAR; INTRAVENOUS; SUBCUTANEOUS at 12:11

## 2021-03-03 NOTE — CASE MANAGEMENT
Pt not yet stable for dc per Dr Jaleesa Meza  Pt for EGD today  Pt will return to [facility] [employer] apartment on d/c  Employee from [employer] will transport home and they request 24h notice of same  CM to contact them at [phone] to inform them of transport date  CM will continue to follow

## 2021-03-03 NOTE — ASSESSMENT & PLAN NOTE
· Reported early satiety and intermittent emesis s/p PO intake a couple of times per week x1 year   · CT C/A: "Mild gastric outlet obstruction "  · S/p NGT to decompress abdomen  · EGD: Single large diverticulum containing no content in the 2nd part of the duodenum with no bleeding  Non obstructed periampullary diverticulum  No stricture or luminal narrowing  Mild erythematous mucosa in the antrum; performed cold forceps biopsy  Biopsies obtained to assess for H pylori  The esophagus appeared normal  Mild irritation from the nasogastric tube but otherwise normal  Small sliding hiatal hernia (type I hiatal hernia    · Continue with Protonix 40mg BID IV  · Started on clear liquid, to titrate to soft diet later in the day

## 2021-03-03 NOTE — PROGRESS NOTES
Progress Note - Trevin Duron 1955, 63 y o  male MRN: [id number]    Unit/Bed#: -01 Encounter: [id number]    Primary Care Provider: No primary care provider on file  Date and time admitted to hospital: 2/26/2021 10:56 PM        * Gastric outlet obstruction  Assessment & Plan  · Reported early satiety and intermittent emesis s/p PO intake a couple of times per week x1 year   · CT C/A: "Mild gastric outlet obstruction "  · S/p NGT to decompress abdomen  · EGD: Single large diverticulum containing no content in the 2nd part of the duodenum with no bleeding  Non obstructed periampullary diverticulum  No stricture or luminal narrowing  Mild erythematous mucosa in the antrum; performed cold forceps biopsy  Biopsies obtained to assess for H pylori  The esophagus appeared normal  Mild irritation from the nasogastric tube but otherwise normal  Small sliding hiatal hernia (type I hiatal hernia    · Continue with Protonix 40mg BID IV  · Started on clear liquid, to titrate to soft diet later in the day       History of traumatic brain injury  Assessment & Plan  · [facility] [place]  · on Adderall 30mg BID (confirmed with PDMP) and Abilify of unknown dose through [facility] (hyperlipidemia)  Assessment & Plan  · Home regimen: Atorvastatin 40 mg daily     Duodenal diverticulum  Assessment & Plan  Incidental finding of duodenal diverticulum in the 3rd portion seen on CT scan A/P causing narrowing of the duodenum  EGD showed nonobstructed periampullary diverticulum, without stricture or luminal narrowing  VTE Pharmacologic Prophylaxis:   Pharmacologic: Enoxaparin (Lovenox)  Mechanical VTE Prophylaxis in Place: Yes    Patient Centered Rounds: I have performed bedside rounds with nursing staff today      Discussions with Specialists or Other Care Team Provider:  Gastroenterology    Education and Discussions with Family / Patient:  Patient, declined family communication    Time Spent for Care: 27 minutes  More than 50% of total time spent on counseling and coordination of care as described above  Current Length of Stay: 4 day(s)    Current Patient Status: Inpatient   Certification Statement: The patient will continue to require additional inpatient hospital stay due to Titration of diet following EGD for gastric outlet obstruction    Discharge Plan:     Code Status: Level 1 - Full Code      Subjective:   Seen this morning  Sleeping  Did not want to be disturbed  Objective:     Vitals:   Temp (24hrs), Av 6 °F (37 °C), Min:98 4 °F (36 9 °C), Max:98 7 °F (37 1 °C)    Temp:  [98 4 °F (36 9 °C)-98 7 °F (37 1 °C)] 98 7 °F (37 1 °C)  HR:  [61-71] 68  Resp:  [16-18] 18  BP: (102-146)/() 121/66  SpO2:  [92 %-96 %] 93 %  Body mass index is 27 41 kg/m²  Input and Output Summary (last 24 hours): Intake/Output Summary (Last 24 hours) at 3/3/2021 1352  Last data filed at 3/3/2021 1113  Gross per 24 hour   Intake 300 ml   Output 1550 ml   Net -1250 ml       Physical Exam:     Physical Exam  Vitals signs reviewed  Constitutional:       General: He is not in acute distress  Appearance: Normal appearance  He is not ill-appearing, toxic-appearing or diaphoretic  HENT:      Head: Normocephalic  Eyes:      Extraocular Movements: Extraocular movements intact  Pupils: Pupils are equal, round, and reactive to light  Neck:      Musculoskeletal: Normal range of motion and neck supple  No neck rigidity or muscular tenderness  Vascular: No carotid bruit  Cardiovascular:      Rate and Rhythm: Normal rate and regular rhythm  Pulses: Normal pulses  Heart sounds: Normal heart sounds  No murmur  No friction rub  Pulmonary:      Effort: Pulmonary effort is normal       Breath sounds: Normal breath sounds  No stridor  No wheezing, rhonchi or rales  Chest:      Chest wall: No tenderness  Abdominal:      General: Abdomen is flat   Bowel sounds are normal  There is no distension  Palpations: Abdomen is soft  Tenderness: There is no abdominal tenderness  There is no guarding or rebound  Comments: NG tube noted   Musculoskeletal: Normal range of motion  General: No swelling or tenderness  Right lower leg: No edema  Left lower leg: No edema  Skin:     General: Skin is warm and dry  Coloration: Skin is not jaundiced  Neurological:      General: No focal deficit present  Mental Status: He is alert and oriented to person, place, and time  Cranial Nerves: No cranial nerve deficit  Sensory: No sensory deficit  Additional Data:     Labs:    Results from last 7 days   Lab Units 03/03/21  0517  02/26/21  2324   WBC Thousand/uL 7 74   < > 8 53   HEMOGLOBIN g/dL 15 0   < > 13 6   HEMATOCRIT % 43 8   < > 41 5   PLATELETS Thousands/uL 207   < > 230   NEUTROS PCT %  --   --  44   LYMPHS PCT %  --   --  38   MONOS PCT %  --   --  11   EOS PCT %  --   --  6    < > = values in this interval not displayed  Results from last 7 days   Lab Units 03/03/21 0517 02/26/21  2324   SODIUM mmol/L 135*   < > 138   POTASSIUM mmol/L 4 5   < > 3 9   CHLORIDE mmol/L 99*   < > 106   CO2 mmol/L 28   < > 23   BUN mg/dL 14   < > 17   CREATININE mg/dL 0 88   < > 1 12   ANION GAP mmol/L 8   < > 9   CALCIUM mg/dL 8 7   < > 8 4   ALBUMIN g/dL  --   --  3 1*   TOTAL BILIRUBIN mg/dL  --   --  0 40   ALK PHOS U/L  --   --  112   ALT U/L  --   --  24   AST U/L  --   --  25   GLUCOSE RANDOM mg/dL 82   < > 165*    < > = values in this interval not displayed  * I Have Reviewed All Lab Data Listed Above  * Additional Pertinent Lab Tests Reviewed:  All Labs Within Last 24 Hours Reviewed    Imaging:    Imaging Reports Reviewed Today Include:   Imaging Personally Reviewed by Myself Includes:      Recent Cultures (last 7 days):           Last 24 Hours Medication List:   Current Facility-Administered Medications   Medication Dose Route Frequency Provider Last Rate    enoxaparin  40 mg Subcutaneous Q24H [address] Natanael Vargas MD      HYDROmorphone  0 5 mg Intravenous Q3H PRN Natanael Vargas MD      multi-electrolyte  125 mL/hr Intravenous Continuous Natanael Vargas  mL/hr (03/03/21 0913)    ondansetron  4 mg Intravenous Q6H PRN Gauri Ellison PA-C      pantoprazole  40 mg Intravenous Q12H [address] Gauri Ellison PA-C      phenol  1 spray Mouth/Throat Q2H PRN Natanael Vargas MD          Today, Patient Was Seen By: Natanael Vargas MD    ** Please Note: Dictation voice to text software may have been used in the creation of this document   **

## 2021-03-03 NOTE — ASSESSMENT & PLAN NOTE
Incidental finding of duodenal diverticulum in the 3rd portion seen on CT scan A/P causing narrowing of the duodenum  EGD showed nonobstructed periampullary diverticulum, without stricture or luminal narrowing

## 2021-03-03 NOTE — OP NOTE
EGD IMPRESSION:  Duodenal diverticulum without obstruction  Antral gastritis biopsy for H pylori  Sliding hiatal hernia  Unremarkable esophagus     RECOMMENDATION:  No signs of ongoing obstruction    Okay to keep NG tube out  Clears, advance as tolerated  Continue PPI IV until tolerating p o , then transition to oral   Await gastric biopsies for H pylori

## 2021-03-03 NOTE — ASSESSMENT & PLAN NOTE
· [facility] [place]  · on Adderall 30mg BID (confirmed with PDMP) and Abilify of unknown dose through [place]

## 2021-03-04 VITALS
WEIGHT: 225.2 LBS | TEMPERATURE: 97.6 F | SYSTOLIC BLOOD PRESSURE: 131 MMHG | BODY MASS INDEX: 27.42 KG/M2 | DIASTOLIC BLOOD PRESSURE: 79 MMHG | HEIGHT: 76 IN | HEART RATE: 86 BPM | OXYGEN SATURATION: 94 % | RESPIRATION RATE: 16 BRPM

## 2021-03-04 PROCEDURE — 99232 SBSQ HOSP IP/OBS MODERATE 35: CPT | Performed by: INTERNAL MEDICINE

## 2021-03-04 PROCEDURE — 99231 SBSQ HOSP IP/OBS SF/LOW 25: CPT | Performed by: INTERNAL MEDICINE

## 2021-03-04 PROCEDURE — C9113 INJ PANTOPRAZOLE SODIUM, VIA: HCPCS | Performed by: INTERNAL MEDICINE

## 2021-03-04 RX ORDER — ARIPIPRAZOLE 5 MG/1
10 TABLET ORAL DAILY
Status: DISCONTINUED | OUTPATIENT
Start: 2021-03-04 | End: 2021-03-05 | Stop reason: HOSPADM

## 2021-03-04 RX ORDER — ATORVASTATIN CALCIUM 40 MG/1
40 TABLET, FILM COATED ORAL
Status: DISCONTINUED | OUTPATIENT
Start: 2021-03-04 | End: 2021-03-05 | Stop reason: HOSPADM

## 2021-03-04 RX ORDER — DEXTROAMPHETAMINE SACCHARATE, AMPHETAMINE ASPARTATE, DEXTROAMPHETAMINE SULFATE AND AMPHETAMINE SULFATE 2.5; 2.5; 2.5; 2.5 MG/1; MG/1; MG/1; MG/1
30 TABLET ORAL 2 TIMES DAILY
Status: DISCONTINUED | OUTPATIENT
Start: 2021-03-04 | End: 2021-03-05 | Stop reason: HOSPADM

## 2021-03-04 RX ORDER — PRAMIPEXOLE DIHYDROCHLORIDE 0.5 MG/1
0.75 TABLET ORAL
COMMUNITY

## 2021-03-04 RX ORDER — PANTOPRAZOLE SODIUM 40 MG/1
40 TABLET, DELAYED RELEASE ORAL
Status: DISCONTINUED | OUTPATIENT
Start: 2021-03-05 | End: 2021-03-05 | Stop reason: HOSPADM

## 2021-03-04 RX ORDER — OXYCODONE HYDROCHLORIDE 5 MG/1
5 TABLET ORAL EVERY 4 HOURS PRN
Status: DISCONTINUED | OUTPATIENT
Start: 2021-03-04 | End: 2021-03-05 | Stop reason: HOSPADM

## 2021-03-04 RX ORDER — PRAMIPEXOLE DIHYDROCHLORIDE 0.5 MG/1
0.5 TABLET ORAL DAILY PRN
Status: DISCONTINUED | OUTPATIENT
Start: 2021-03-04 | End: 2021-03-05 | Stop reason: HOSPADM

## 2021-03-04 RX ORDER — PANTOPRAZOLE SODIUM 40 MG/1
40 TABLET, DELAYED RELEASE ORAL DAILY
Qty: 14 TABLET | Refills: 0 | Status: ON HOLD | OUTPATIENT
Start: 2021-03-04 | End: 2021-11-16 | Stop reason: ALTCHOICE

## 2021-03-04 RX ADMIN — HYDROMORPHONE HYDROCHLORIDE 0.5 MG: 1 INJECTION, SOLUTION INTRAMUSCULAR; INTRAVENOUS; SUBCUTANEOUS at 04:01

## 2021-03-04 RX ADMIN — ARIPIPRAZOLE 10 MG: 5 TABLET ORAL at 09:08

## 2021-03-04 RX ADMIN — OXYCODONE HYDROCHLORIDE 5 MG: 5 TABLET ORAL at 07:39

## 2021-03-04 RX ADMIN — ATORVASTATIN CALCIUM 40 MG: 40 TABLET, FILM COATED ORAL at 17:05

## 2021-03-04 RX ADMIN — DEXTROAMPHETAMINE SACCHARATE, AMPHETAMINE ASPARTATE, DEXTROAMPHETAMINE SULFATE AND AMPHETAMINE SULFATE 30 MG: 2.5; 2.5; 2.5; 2.5 TABLET ORAL at 09:08

## 2021-03-04 RX ADMIN — HYDROMORPHONE HYDROCHLORIDE 0.5 MG: 1 INJECTION, SOLUTION INTRAMUSCULAR; INTRAVENOUS; SUBCUTANEOUS at 00:15

## 2021-03-04 RX ADMIN — PRAMIPEXOLE DIHYDROCHLORIDE 0.5 MG: 0.5 TABLET ORAL at 22:25

## 2021-03-04 RX ADMIN — DEXTROAMPHETAMINE SACCHARATE, AMPHETAMINE ASPARTATE, DEXTROAMPHETAMINE SULFATE AND AMPHETAMINE SULFATE 30 MG: 2.5; 2.5; 2.5; 2.5 TABLET ORAL at 17:05

## 2021-03-04 RX ADMIN — OXYCODONE HYDROCHLORIDE 5 MG: 5 TABLET ORAL at 13:58

## 2021-03-04 RX ADMIN — OXYCODONE HYDROCHLORIDE 5 MG: 5 TABLET ORAL at 19:17

## 2021-03-04 RX ADMIN — PANTOPRAZOLE SODIUM 40 MG: 40 INJECTION, POWDER, FOR SOLUTION INTRAVENOUS at 09:00

## 2021-03-04 NOTE — CASE MANAGEMENT
Pt to be medically clear for DC back to [facility]  Call to [facility], [address] ([phone]); she states Pt's insurance is [facility] with mail-order meds  Olamide requested if we could provide Pt with Protonix 40mg at DC  Call to [facility]; discussed case  Rosibel approved 30-day supply Protonix  Call to [facility]  Faxed Protonix script fax# [phone]  [facility] will call this CM with cost of med, and then CM faxes [phone] will bring this med tomorrow around 1500  Call to [facility]; informed Olamide of above  Olamide states they will plan to pick pt up tomorrow at 1530  Informed nursing  Also informed nursing of plan for Protonix to be delivered Meds to [facility] prior to [phone]

## 2021-03-04 NOTE — PROGRESS NOTES
Progress Note - Donell Dick 1955, 63 y o  male MRN: [id number]    Unit/Bed#: -01 Encounter: [id number]    Primary Care Provider: No primary care provider on file  Date and time admitted to hospital: 2/26/2021 10:56 PM        * Gastric outlet obstruction  Assessment & Plan  · Reported early satiety and intermittent emesis s/p PO intake a couple of times per week x1 year   · CT C/A: "Mild gastric outlet obstruction "  · S/p NGT to decompress abdomen  · EGD: Single large diverticulum containing no content in the 2nd part of the duodenum with no bleeding  Non obstructed periampullary diverticulum  No stricture or luminal narrowing  Mild erythematous mucosa in the antrum; performed cold forceps biopsy  Biopsies obtained to assess for H pylori  The esophagus appeared normal  Mild irritation from the nasogastric tube but otherwise normal  Small sliding hiatal hernia (type I hiatal hernia)    change to Protonix 40mg daily PO  Resume all p o  medication  Changed to regular diet  Anticipated discharge tomorrow morning     History of traumatic brain injury  Assessment & Plan  · [facility] [place]  · on Adderall 30mg BID (confirmed with PDMP) and Abilify of unknown dose through [facility]       HLD (hyperlipidemia)  Assessment & Plan  · Home regimen: Atorvastatin 40 mg daily     Duodenal diverticulum  Assessment & Plan  Incidental finding of duodenal diverticulum in the 3rd portion seen on CT scan A/P causing narrowing of the duodenum  EGD showed nonobstructed periampullary diverticulum, without stricture or luminal narrowing  VTE Pharmacologic Prophylaxis:   Pharmacologic: Enoxaparin (Lovenox)  Mechanical VTE Prophylaxis in Place: Yes    Patient Centered Rounds: I have performed bedside rounds with nursing staff today      Discussions with Specialists or Other Care Team Provider:     Education and Discussions with Family / Patient:  Patient, declined family communication  Time Spent for Care: 27 minutes  More than 50% of total time spent on counseling and coordination of care as described above  Current Length of Stay: 5 day(s)    Current Patient Status: Inpatient   Certification Statement: The patient will continue to require additional inpatient hospital stay due to Titration of diet, resumption of medication following gastric outlet obstruction    Discharge Plan:  Tomorrow    Code Status: Level 1 - Full Code      Subjective:   Seen this morning  Feels much better  Still with mild abdominal pain, no nausea or vomiting  No shortness of breath or chest pain  Informed patient I will be taking away IV pain medication  Objective:     Vitals:   Temp (24hrs), Av 1 °F (36 7 °C), Min:97 6 °F (36 4 °C), Max:98 4 °F (36 9 °C)    Temp:  [97 6 °F (36 4 °C)-98 4 °F (36 9 °C)] 97 6 °F (36 4 °C)  HR:  [60-68] 67  Resp:  [16-18] 18  BP: (106-138)/(64-72) 106/64  SpO2:  [92 %-100 %] 92 %  Body mass index is 27 41 kg/m²  Input and Output Summary (last 24 hours): Intake/Output Summary (Last 24 hours) at 3/4/2021 1315  Last data filed at 3/4/2021 0931  Gross per 24 hour   Intake 900 ml   Output --   Net 900 ml       Physical Exam:     Physical Exam  Vitals signs reviewed  Constitutional:       General: He is not in acute distress  Appearance: Normal appearance  He is not ill-appearing, toxic-appearing or diaphoretic  HENT:      Head: Normocephalic  Eyes:      Extraocular Movements: Extraocular movements intact  Pupils: Pupils are equal, round, and reactive to light  Neck:      Musculoskeletal: Normal range of motion and neck supple  No neck rigidity or muscular tenderness  Vascular: No carotid bruit  Cardiovascular:      Rate and Rhythm: Normal rate and regular rhythm  Pulses: Normal pulses  Heart sounds: Normal heart sounds  No murmur  No friction rub  Pulmonary:      Effort: Pulmonary effort is normal       Breath sounds: Normal breath sounds  No stridor   No wheezing, rhonchi or rales  Chest:      Chest wall: No tenderness  Abdominal:      General: Abdomen is flat  Bowel sounds are normal  There is no distension  Palpations: Abdomen is soft  Tenderness: There is no abdominal tenderness  There is no guarding or rebound  Musculoskeletal: Normal range of motion  General: No swelling or tenderness  Right lower leg: No edema  Left lower leg: No edema  Skin:     General: Skin is warm and dry  Coloration: Skin is not jaundiced  Neurological:      General: No focal deficit present  Mental Status: He is alert and oriented to person, place, and time  Cranial Nerves: No cranial nerve deficit  Sensory: No sensory deficit  Additional Data:     Labs:    Results from last 7 days   Lab Units 03/03/21  0517  02/26/21  2324   WBC Thousand/uL 7 74   < > 8 53   HEMOGLOBIN g/dL 15 0   < > 13 6   HEMATOCRIT % 43 8   < > 41 5   PLATELETS Thousands/uL 207   < > 230   NEUTROS PCT %  --   --  44   LYMPHS PCT %  --   --  38   MONOS PCT %  --   --  11   EOS PCT %  --   --  6    < > = values in this interval not displayed  Results from last 7 days   Lab Units 03/03/21  0517  02/26/21  2324   SODIUM mmol/L 135*   < > 138   POTASSIUM mmol/L 4 5   < > 3 9   CHLORIDE mmol/L 99*   < > 106   CO2 mmol/L 28   < > 23   BUN mg/dL 14   < > 17   CREATININE mg/dL 0 88   < > 1 12   ANION GAP mmol/L 8   < > 9   CALCIUM mg/dL 8 7   < > 8 4   ALBUMIN g/dL  --   --  3 1*   TOTAL BILIRUBIN mg/dL  --   --  0 40   ALK PHOS U/L  --   --  112   ALT U/L  --   --  24   AST U/L  --   --  25   GLUCOSE RANDOM mg/dL 82   < > 165*    < > = values in this interval not displayed  * I Have Reviewed All Lab Data Listed Above  * Additional Pertinent Lab Tests Reviewed:  All Labs Within Last 24 Hours Reviewed    Imaging:    Imaging Reports Reviewed Today Include:   Imaging Personally Reviewed by Myself Includes:      Recent Cultures (last 7 days):           Last 24 Hours Medication List:   Current Facility-Administered Medications   Medication Dose Route Frequency Provider Last Rate    amphetamine-dextroamphetamine  30 mg Oral BID Natanael Vargas MD      ARIPiprazole  10 mg Oral Daily Natanael Vargas MD      atorvastatin  40 mg Oral Daily With Leeann Bowling MD      enoxaparin  40 mg Subcutaneous Q24H [address] Budd Pattis, DO      ondansetron  4 mg Intravenous Q6H PRN Jordan Jamil, DO      oxyCODONE  5 mg Oral Q4H PRN Natanael Vargas MD      [START ON 3/5/2021] pantoprazole  40 mg Oral Early Morning Natanael Vargas MD      phenol  1 spray Mouth/Throat Q2H PRN Jordan Jamil,           Today, Patient Was Seen By: Natanael Vargas MD    ** Please Note: Dictation voice to text software may have been used in the creation of this document   **

## 2021-03-04 NOTE — ASSESSMENT & PLAN NOTE
· [facility] [place]  · on Adderall 30mg BID (confirmed with PDMP) and Abilify of unknown dose through [facility]

## 2021-03-04 NOTE — ASSESSMENT & PLAN NOTE
· Reported early satiety and intermittent emesis s/p PO intake a couple of times per week x1 year   · CT C/A: "Mild gastric outlet obstruction "  · S/p NGT to decompress abdomen  · EGD: Single large diverticulum containing no content in the 2nd part of the duodenum with no bleeding  Non obstructed periampullary diverticulum  No stricture or luminal narrowing  Mild erythematous mucosa in the antrum; performed cold forceps biopsy  Biopsies obtained to assess for H pylori  The esophagus appeared normal  Mild irritation from the nasogastric tube but otherwise normal  Small sliding hiatal hernia (type I hiatal hernia)    change to Protonix 40mg daily PO  Resume all p o  medication  Changed to regular diet  Anticipated discharge tomorrow morning

## 2021-03-04 NOTE — PROGRESS NOTES
Progress note - Gastroenterology   Gweneth Shape 63 y o  male MRN: [id number]  Unit/Bed#: -[place] Encounter: [id number]    ASSESSMENT and PLAN    3 79-year-old male with Epigastric pain/abnormal CT scan of the stomach and duodenum - history of colonic diverticulosis who has been having progressively worsening abdominal pain over the past year  cT scan suggesting partial gastric outlet obstruction and possible diverticula of the duodenum  EGD done yesterday showed:  Duodenal diverticulum without obstruction  Antral gastritis biopsy for H pylori  Sliding hiatal hernia  Unremarkable esophagus      Improved, difficult to know what the initial problem was perhaps gastroparesis given the fact that he is taking p o  Okay to discharge on PPI 40 mg of Protonix daily  We will be happy to see as an outpatient  Should have office evaluation to discuss need for and timing of colonoscopy      Chief Complaint   Patient presents with    Chest Pain       SUBJECTIVE/HPI   Feels better eating no GI symptoms    /64   Pulse 67   Temp 97 6 °F (36 4 °C)   Resp 18   Ht 6' 4" (1 93 m)   Wt 102 kg (225 lb 3 2 oz)   SpO2 92%   BMI 27 41 kg/m²     PHYSICALEXAM  General appearance: alert, appears stated age and cooperative  Eyes: PERLLA, EOMI, no icterus   Head: Normocephalic, without obvious abnormality, atraumatic  Extremities: extremities normal, atraumatic, no cyanosis or edema  Neurologic: Grossly normal    Lab Results   Component Value Date    CALCIUM 8 7 03/03/2021    K 4 5 03/03/2021    CO2 28 03/03/2021    CL 99 (L) 03/03/2021    BUN 14 03/03/2021    CREATININE 0 88 03/03/2021     Lab Results   Component Value Date    WBC 7 74 03/03/2021    HGB 15 0 03/03/2021    HCT 43 8 03/03/2021    MCV 93 03/03/2021     03/03/2021     Lab Results   Component Value Date    ALT 24 02/26/2021    AST 25 02/26/2021    ALKPHOS 112 02/26/2021     No results found for: AMYLASE  No results found for: LIPASE  No results found for: IRON, TIBC, FERRITIN  No results found for: INR

## 2021-03-05 LAB
FLUAV RNA RESP QL NAA+PROBE: NEGATIVE
FLUBV RNA RESP QL NAA+PROBE: NEGATIVE
RSV RNA RESP QL NAA+PROBE: NEGATIVE
SARS-COV-2 RNA RESP QL NAA+PROBE: NEGATIVE

## 2021-03-05 PROCEDURE — 0241U HB NFCT DS VIR RESP RNA 4 TRGT: CPT | Performed by: INTERNAL MEDICINE

## 2021-03-05 PROCEDURE — 99231 SBSQ HOSP IP/OBS SF/LOW 25: CPT | Performed by: INTERNAL MEDICINE

## 2021-03-05 PROCEDURE — 99239 HOSP IP/OBS DSCHRG MGMT >30: CPT | Performed by: INTERNAL MEDICINE

## 2021-03-05 RX ORDER — POLYETHYLENE GLYCOL 3350 17 G/17G
17 POWDER, FOR SOLUTION ORAL ONCE
Status: COMPLETED | OUTPATIENT
Start: 2021-03-05 | End: 2021-03-05

## 2021-03-05 RX ADMIN — POLYETHYLENE GLYCOL 3350 17 G: 17 POWDER, FOR SOLUTION ORAL at 09:03

## 2021-03-05 RX ADMIN — DEXTROAMPHETAMINE SACCHARATE, AMPHETAMINE ASPARTATE, DEXTROAMPHETAMINE SULFATE AND AMPHETAMINE SULFATE 30 MG: 2.5; 2.5; 2.5; 2.5 TABLET ORAL at 09:03

## 2021-03-05 RX ADMIN — OXYCODONE HYDROCHLORIDE 5 MG: 5 TABLET ORAL at 05:16

## 2021-03-05 RX ADMIN — OXYCODONE HYDROCHLORIDE 5 MG: 5 TABLET ORAL at 00:34

## 2021-03-05 RX ADMIN — ARIPIPRAZOLE 10 MG: 5 TABLET ORAL at 09:03

## 2021-03-05 RX ADMIN — OXYCODONE HYDROCHLORIDE 5 MG: 5 TABLET ORAL at 13:53

## 2021-03-05 RX ADMIN — PANTOPRAZOLE SODIUM 40 MG: 40 TABLET, DELAYED RELEASE ORAL at 05:16

## 2021-03-05 NOTE — DISCHARGE INSTR - AVS FIRST PAGE
Dear Corina Clancy,     It was our pleasure to care for you here at [facility], [address] [place] [address]  It is our hope that we were always able to exceed the expected standards for your care during your stay  You were hospitalized due to abdominal pain, you were treated for gastric outlet obstruction with an NG tube and also an endoscopy which showed gastritis and also hiatal hernia   You were cared for on the medicine floor by Ravi Hsu MD with the [facility] who covers for your primary care physician (PCP), No primary care provider on file  , while you were hospitalized  If you have any questions or concerns related to this hospitalization, you may contact us at [phone]  For follow up as well as any medication refills, we recommend that you follow up with your primary care physician  A registered nurse will reach out to you by phone within a few days after your discharge to answer any additional questions that you may have after going home  However, at this time we provide for you here, the most important instructions / recommendations at discharge:     · Notable Medication Adjustments -   · Protonix 40 mg daily for 30 days   · Testing Required after Discharge -   ·   · Important follow up information -   · Primary care physician within 1 week of discharge  · Gastroenterology within 2-4 weeks of discharge  · Other Instructions -   ·   · Please review this entire after visit summary as additional general instructions including medication list, appointments, activity, diet, any pertinent wound care, and other additional recommendations from your care team that may be provided for you        Sincerely,     Ravi Hsu MD

## 2021-03-05 NOTE — COVID-19 HEALTH CARE FACILITY TRANSFER FORM
[facility] to [facility] Transfer - COVID-19 Assessment             Name of Patient: Rachel Malcolm                : 1955          Transport Date: 21       Has the patient been laboratory tested for COVID-19? []  NO  If No,Test was not indicated per  CDC Testing Criteria   May Transfer Patient   [x] YES  If Tested Results below     COVID-19 References              SARS-CoV-2   Date/Time Value Ref Range Status   2021 01:53 PM Negative Negative Final            Question is to be completed for any patient who tests positive for COVID-19        1  [x] Yes [May Transfer] [] No [May Not Transfer]          Question is to be completed for any patient who is tested for COVID-19            2    [] Yes [May Not Transfer] [x] No [May Transfer]          Signature of Physician or Health Care Professional: Ina Howard MD 21          Form updated as of 3/24/2020

## 2021-03-05 NOTE — PLAN OF CARE
Problem: PAIN - ADULT  Goal: Verbalizes/displays adequate comfort level or baseline comfort level  Description: Interventions:  - Encourage patient to monitor pain and request assistance  - Assess pain using appropriate pain scale  - Administer analgesics based on type and severity of pain and evaluate response  - Implement non-pharmacological measures as appropriate and evaluate response  - Consider cultural and social influences on pain and pain management  - Notify physician/advanced practitioner if interventions unsuccessful or patient reports new pain  Outcome: Progressing     Problem: SAFETY ADULT  Goal: Patient will remain free of falls  Description: INTERVENTIONS:  - Assess patient frequently for physical needs  -  Identify cognitive and physical deficits and behaviors that affect risk of falls    -  [place] fall precautions as indicated by assessment   - Educate patient/family on patient safety including physical limitations  - Instruct patient to call for assistance with activity based on assessment  - Modify environment to reduce risk of injury  - Consider OT/PT consult to assist with strengthening/mobility  Outcome: Progressing  Goal: Maintain or return to baseline ADL function  Description: INTERVENTIONS:  -  Assess patient's ability to carry out ADLs; assess patient's baseline for ADL function and identify physical deficits which impact ability to perform ADLs (bathing, care of mouth/teeth, toileting, grooming, dressing, etc )  - Assess/evaluate cause of self-care deficits   - Assess range of motion  - Assess patient's mobility; develop plan if impaired  - Assess patient's need for assistive devices and provide as appropriate  - Encourage maximum independence but intervene and supervise when necessary  - Involve family in performance of ADLs  - Assess for home care needs following discharge   - Consider OT consult to assist with ADL evaluation and planning for discharge  - Provide patient education as appropriate  Outcome: Progressing  Goal: Maintain or return mobility status to optimal level  Description: INTERVENTIONS:  - Assess patient's baseline mobility status (ambulation, transfers, stairs, etc )    - Identify cognitive and physical deficits and behaviors that affect mobility  - Identify mobility aids required to assist with transfers and/or ambulation (gait belt, sit-to-stand, lift, walker, cane, etc )  - [place] fall precautions as indicated by assessment  - Record patient progress and toleration of activity level on Mobility SBAR; progress patient to next Phase/Stage  - Instruct patient to call for assistance with activity based on assessment  - Consider rehabilitation consult to assist with strengthening/weightbearing, etc   Outcome: Progressing     Problem: Nutrition  Goal: Nutrition/Hydration status is improving  Description: Monitor and assess patient's nutrition/hydration status for malnutrition (ex- brittle hair, bruises, dry skin, pale skin and conjunctiva, muscle wasting, smooth red tongue, and disorientation)  Collaborate with interdisciplinary team and initiate plan and interventions as ordered  Monitor patient's weight and dietary intake as ordered or per policy  Utilize nutrition screening tool and intervene per policy  Determine patient's food preferences and provide high-protein, high-caloric foods as appropriate  - Assist patient with eating   - Allow adequate time for meals   - Encourage patient to take dietary supplement as ordered  - Collaborate with clinical nutritionist   - Include patient/family/caregiver in decisions related to nutrition    Outcome: Progressing     Problem: GASTROINTESTINAL - ADULT  Goal: Minimal or absence of nausea and/or vomiting  Description: INTERVENTIONS:  - Administer IV fluids if ordered to ensure adequate hydration  - Maintain NPO status until nausea and vomiting are resolved  - Nasogastric tube if ordered  - Administer ordered antiemetic medications as needed  - Provide nonpharmacologic comfort measures as appropriate  - Advance diet as tolerated, if ordered  - Consider nutrition services referral to assist patient with adequate nutrition and appropriate food choices  Outcome: Progressing  Goal: Maintains or returns to baseline bowel function  Description: INTERVENTIONS:  - Assess bowel function  - Encourage oral fluids to ensure adequate hydration  - Administer IV fluids if ordered to ensure adequate hydration  - Administer ordered medications as needed  - Encourage mobilization and activity  - Consider nutritional services referral to assist patient with adequate nutrition and appropriate food choices  Outcome: Progressing  Goal: Maintains adequate nutritional intake  Description: INTERVENTIONS:  - Monitor percentage of each meal consumed  - Identify factors contributing to decreased intake, treat as appropriate  - Assist with meals as needed  - Monitor I&O, weight, and lab values if indicated  - Obtain nutrition services referral as needed  Outcome: Progressing  Goal: Establish and maintain optimal ostomy function  Description: INTERVENTIONS:  - Assess bowel function  - Encourage oral fluids to ensure adequate hydration  - Administer IV fluids if ordered to ensure adequate hydration   - Administer ordered medications as needed  - Encourage mobilization and activity  - Nutrition services referral to assist patient with appropriate food choices  - Assess stoma site  - Consider wound care consult   Outcome: Progressing     Problem: Nutrition/Hydration-ADULT  Goal: Nutrient/Hydration intake appropriate for improving, restoring or maintaining nutritional needs  Description: Monitor and assess patient's nutrition/hydration status for malnutrition  Collaborate with interdisciplinary team and initiate plan and interventions as ordered  Monitor patient's weight and dietary intake as ordered or per policy   Utilize nutrition screening tool and intervene as necessary  Determine patient's food preferences and provide high-protein, high-caloric foods as appropriate  INTERVENTIONS:  - Monitor oral intake, urinary output, labs, and treatment plans  - Assess nutrition and hydration status and recommend course of action  - Evaluate amount of meals eaten  - Assist patient with eating if necessary   - Allow adequate time for meals  - Recommend/ encourage appropriate diets, oral nutritional supplements, and vitamin/mineral supplements  - Order, calculate, and assess calorie counts as needed  - Recommend, monitor, and adjust tube feedings and TPN/PPN based on assessed needs  - Assess need for intravenous fluids  - Provide specific nutrition/hydration education as appropriate  - Include patient/family/caregiver in decisions related to nutrition  Outcome: Progressing     Problem: Prexisting or High Potential for Compromised Skin Integrity  Goal: Skin integrity is maintained or improved  Description: INTERVENTIONS:  - Identify patients at risk for skin breakdown  - Assess and monitor skin integrity  - Assess and monitor nutrition and hydration status  - Monitor labs   - Assess for incontinence   - Turn and reposition patient  - Assist with mobility/ambulation  - Relieve pressure over bony prominences  - Avoid friction and shearing  - Provide appropriate hygiene as needed including keeping skin clean and dry  - Evaluate need for skin moisturizer/barrier cream  - Collaborate with interdisciplinary team   - Patient/family teaching  - Consider wound care consult   Outcome: Progressing     Problem: Potential for Falls  Goal: Patient will remain free of falls  Description: INTERVENTIONS:  - Assess patient frequently for physical needs  -  Identify cognitive and physical deficits and behaviors that affect risk of falls    -  [place] fall precautions as indicated by assessment   - Educate patient/family on patient safety including physical limitations  - Instruct patient to call for assistance with activity based on assessment  - Modify environment to reduce risk of injury  - Consider OT/PT consult to assist with strengthening/mobility  Outcome: Progressing

## 2021-03-05 NOTE — ASSESSMENT & PLAN NOTE
· [facility] [place]  · on Adderall 30mg BID (confirmed with PDMP) and Abilify of unknown dose through [place]

## 2021-03-05 NOTE — NURSING NOTE
Pt stable at d/c ambulatory some abd discomfort    still took oxycontin x1    VSS appetite good,, no bm after taking miralax and prune juice, is passing gas    supple of protonix meds sent from [facility] and given to pt to give to new vitae staff

## 2021-03-05 NOTE — ASSESSMENT & PLAN NOTE
· Reported early satiety and intermittent emesis s/p PO intake a couple of times per week x1 year   · CT C/A: "Mild gastric outlet obstruction "  · S/p NGT to decompress abdomen  · EGD: Single large diverticulum containing no content in the 2nd part of the duodenum with no bleeding  Non obstructed periampullary diverticulum  No stricture or luminal narrowing  Mild erythematous mucosa in the antrum; performed cold forceps biopsy  Biopsies obtained to assess for H pylori  The esophagus appeared normal  Mild irritation from the nasogastric tube but otherwise normal  Small sliding hiatal hernia (type I hiatal hernia)    Tolerating diet  Medically stable for discharge    Discharge on Protonix 40mg daily PO for 1 month  Follow up with GI within 2-4 weeks

## 2021-03-05 NOTE — DISCHARGE SUMMARY
Discharge- Dee Grad 1955, 63 y o  male MRN: [id number]    Unit/Bed#: -01 Encounter: [id number]    Primary Care Provider: No primary care provider on file  Date and time admitted to hospital: 2/26/2021 10:56 PM        * Gastric outlet obstruction  Assessment & Plan  · Reported early satiety and intermittent emesis s/p PO intake a couple of times per week x1 year   · CT C/A: "Mild gastric outlet obstruction "  · S/p NGT to decompress abdomen  · EGD: Single large diverticulum containing no content in the 2nd part of the duodenum with no bleeding  Non obstructed periampullary diverticulum  No stricture or luminal narrowing  Mild erythematous mucosa in the antrum; performed cold forceps biopsy  Biopsies obtained to assess for H pylori  The esophagus appeared normal  Mild irritation from the nasogastric tube but otherwise normal  Small sliding hiatal hernia (type I hiatal hernia)    Tolerating diet  Medically stable for discharge    Discharge on Protonix 40mg daily PO for 1 month  Follow up with GI within 2-4 weeks    History of traumatic brain injury  Assessment & Plan  · [facility] [place]  · on Adderall 30mg BID (confirmed with PDMP) and [facility]  Assessment & Plan  · Home regimen: Atorvastatin 40 mg daily     Duodenal diverticulum  Assessment & Plan  Incidental finding of duodenal diverticulum in the 3rd portion seen on CT scan A/P causing narrowing of the duodenum  EGD showed nonobstructed periampullary diverticulum, without stricture or luminal narrowing  Discharging Physician / Practitioner: Michael Thompson MD  PCP: No primary care provider on file    Admission Date:   Admission Orders (From admission, onward)     Ordered        02/27/21 1416  Inpatient Admission  Once         02/27/21 0308  Place in Observation  Once                   Discharge Date: 03/05/21    Resolved Problems  Date Reviewed: 2/28/2021    None Consultations During Hospital Stay:  · Gastroenterology    Procedures Performed:   Endoscopy: Single large diverticulum containing no content in the 2nd part of the duodenum with no bleeding  Non obstructed periampullary diverticulum  No stricture or luminal narrowing  Mild erythematous mucosa in the antrum; performed cold forceps biopsy  Biopsies obtained to assess for H pylori  The esophagus appeared normal  Mild irritation from the nasogastric tube but otherwise normal  Small sliding hiatal hernia (type I hiatal hernia)    Significant Findings / Test Results:   ·     Incidental Findings:   ·      Test Results Pending at Discharge (will require follow up):   ·      Outpatient Tests Requested:  ·     Complications:      Reason for Admission:  Emesis, early satiety      Hospital Course:     Rachel Malcolm is a 63 y o  male patient who originally presented to the hospital on 2/26/2021 due to Emesis, early satiety  CT dissection protocol chest abdomen pelvis showed 4 6 cm fluid and air containing duodenal diverticulum the 3rd portion causing narrowing of the duodenum, mild gastric outlet obstruction  An NG tube was placed with significant amount of drainage  This give improvement in his symptoms  He subsequently underwent EGD which showed single large diverticulum with no bleeding, no stricture luminal narrowing, mild erythematous mucosa in the antrum, biopsies were taken  NG tube was discontinued with relief  He is now tolerating a diet  He has been discharged on Protonix 40 mg daily for 1 month  He will need to follow up with Gastroenterology within 2-4 weeks  Please see above list of diagnoses and related plan for additional information  Condition at Discharge: stable     Discharge Day Visit / Exam:     Subjective:  Seen this morning  Tolerating his diet  No abdominal pain      Vitals: Blood Pressure: 131/79 (03/04/21 2317)  Pulse: 86 (03/04/21 2317)  Temperature: 97 6 °F (36 4 °C) (03/04/21 2317)  Temp Source: Oral (03/03/21 2315)  Respirations: 16 (03/04/21 2317)  Height: 6' 4" (193 cm) (02/27/21 0416)  Weight - Scale: 102 kg (225 lb 3 2 oz) (02/27/21 0416)  SpO2: 94 % (03/04/21 2317)  Exam:   Physical Exam  Vitals signs reviewed  Constitutional:       General: He is not in acute distress  Appearance: Normal appearance  He is not ill-appearing, toxic-appearing or diaphoretic  HENT:      Head: Normocephalic  Eyes:      Extraocular Movements: Extraocular movements intact  Pupils: Pupils are equal, round, and reactive to light  Neck:      Musculoskeletal: Normal range of motion and neck supple  No neck rigidity or muscular tenderness  Vascular: No carotid bruit  Cardiovascular:      Rate and Rhythm: Normal rate and regular rhythm  Pulses: Normal pulses  Heart sounds: Normal heart sounds  No murmur  No friction rub  Pulmonary:      Effort: Pulmonary effort is normal       Breath sounds: Normal breath sounds  No stridor  No wheezing, rhonchi or rales  Chest:      Chest wall: No tenderness  Abdominal:      General: Abdomen is flat  Bowel sounds are normal  There is no distension  Palpations: Abdomen is soft  Tenderness: There is no abdominal tenderness  There is no guarding or rebound  Musculoskeletal: Normal range of motion  General: No swelling or tenderness  Right lower leg: No edema  Left lower leg: No edema  Skin:     General: Skin is warm and dry  Coloration: Skin is not jaundiced  Neurological:      General: No focal deficit present  Mental Status: He is alert and oriented to person, place, and time  Cranial Nerves: No cranial nerve deficit  Sensory: No sensory deficit  Discussion with Family:  Declined    Discharge instructions/Information to patient and family:   See after visit summary for information provided to patient and family        Provisions for Follow-Up Care:  See after visit summary for information related to follow-up care and any pertinent home health orders  Disposition:     Other:   For Discharges to [facility] SNF:   · Not Applicable to this Patient - Not Applicable to this Patient    Planned Readmission:  No     Discharge Statement:  I spent 55 minutes discharging the patient  This time was spent on the day of discharge  I had direct contact with the patient on the day of discharge  Greater than 50% of the total time was spent examining patient, answering all patient questions, arranging and discussing plan of care with patient as well as directly providing post-discharge instructions  Additional time then spent on discharge activities  Discharge Medications:  See after visit summary for reconciled discharge medications provided to patient and family        ** Please Note: This note has been constructed using a voice recognition system **

## 2021-03-05 NOTE — PROGRESS NOTES
Progress note - Gastroenterology   Faby Schmidt 63 y o  male MRN: [id number]  Unit/Bed#: -01 Encounter: [id number]    ASSESSMENT and PLAN       3 81-year-old male with Epigastric pain/abnormal CT scan of the stomach and duodenum - history of colonic diverticulosis who has been having progressively worsening abdominal pain over the past year  CT scan suggesting partial gastric outlet obstruction and possible diverticula of the duodenum  EGD w/ duodenal diverticulum otherwise negative  - OK to discharge  - Awaiting EGD biopsy for H pylori  - Continue pantoprazole for 1 month  - Follow up w/ us in office    Chief Complaint   Patient presents with    Chest Pain       SUBJECTIVE/HPI   Tolerating diet without difficulty    No abdominal pain    /79   Pulse 86   Temp 97 6 °F (36 4 °C)   Resp 16   Ht 6' 4" (1 93 m)   Wt 102 kg (225 lb 3 2 oz)   SpO2 94%   BMI 27 41 kg/m²     PHYSICALEXAM  General appearance: alert, appears stated age and cooperative  Eyes: PERLLA, EOMI, no icterus   Head: Normocephalic, without obvious abnormality, atraumatic  Lungs: clear to auscultation bilaterally  Heart: regular rate and rhythm, S1, S2 normal, no murmur, click, rub or gallop  Abdomen: soft, non-tender; bowel sounds normal; no masses,  no organomegaly  Extremities: extremities normal, atraumatic, no cyanosis or edema  Neurologic: Grossly normal    Lab Results   Component Value Date    CALCIUM 8 7 03/03/2021    K 4 5 03/03/2021    CO2 28 03/03/2021    CL 99 (L) 03/03/2021    BUN 14 03/03/2021    CREATININE 0 88 03/03/2021     Lab Results   Component Value Date    WBC 7 74 03/03/2021    HGB 15 0 03/03/2021    HCT 43 8 03/03/2021    MCV 93 03/03/2021     03/03/2021     Lab Results   Component Value Date    ALT 24 02/26/2021    AST 25 02/26/2021    ALKPHOS 112 02/26/2021

## 2021-03-05 NOTE — CASE MANAGEMENT
LOS 6 DAYS  Call to Zachary Prell; [facility] Protonix will be sent via [employer] today  Call to [facility] transport [phone]; confirmed Pt will be picked up at  today  Met with Pt; informed him of above  No questions  CM dept will continue to follow to DC

## 2021-03-08 ENCOUNTER — TELEPHONE (OUTPATIENT)
Dept: GASTROENTEROLOGY | Facility: CLINIC | Age: 66
End: 2021-03-08

## 2021-03-08 NOTE — TELEPHONE ENCOUNTER
Per Dr Mylene Giang, please arrange an office visit in 6 weeks with Dr Salvador Kirkpatrick or himself and patient  He was discharged from [address] [place] [address] Road 03/04/2021   Thx

## 2021-03-19 ENCOUNTER — APPOINTMENT (EMERGENCY)
Dept: RADIOLOGY | Facility: HOSPITAL | Age: 66
End: 2021-03-19
Payer: COMMERCIAL

## 2021-03-19 ENCOUNTER — HOSPITAL ENCOUNTER (EMERGENCY)
Facility: HOSPITAL | Age: 66
Discharge: NURSING, REHAB OR LONG-TERM CARE | End: 2021-03-20
Attending: EMERGENCY MEDICINE | Admitting: EMERGENCY MEDICINE
Payer: COMMERCIAL

## 2021-03-19 DIAGNOSIS — R07.9 CHEST PAIN: Primary | ICD-10-CM

## 2021-03-19 LAB
ALBUMIN SERPL BCP-MCNC: 3.3 G/DL (ref 3.5–5)
ALP SERPL-CCNC: 116 U/L (ref 46–116)
ALT SERPL W P-5'-P-CCNC: 28 U/L (ref 12–78)
ANION GAP SERPL CALCULATED.3IONS-SCNC: 7 MMOL/L (ref 4–13)
AST SERPL W P-5'-P-CCNC: 13 U/L (ref 5–45)
BASOPHILS # BLD AUTO: 0.1 THOUSANDS/ΜL (ref 0–0.1)
BASOPHILS NFR BLD AUTO: 1 % (ref 0–1)
BILIRUB SERPL-MCNC: 0.3 MG/DL (ref 0.2–1)
BUN SERPL-MCNC: 15 MG/DL (ref 5–25)
CALCIUM ALBUM COR SERPL-MCNC: 9.5 MG/DL (ref 8.3–10.1)
CALCIUM SERPL-MCNC: 8.9 MG/DL (ref 8.3–10.1)
CHLORIDE SERPL-SCNC: 105 MMOL/L (ref 100–108)
CO2 SERPL-SCNC: 28 MMOL/L (ref 21–32)
CREAT SERPL-MCNC: 1.1 MG/DL (ref 0.6–1.3)
EOSINOPHIL # BLD AUTO: 0.6 THOUSAND/ΜL (ref 0–0.61)
EOSINOPHIL NFR BLD AUTO: 6 % (ref 0–6)
ERYTHROCYTE [DISTWIDTH] IN BLOOD BY AUTOMATED COUNT: 12.2 % (ref 11.6–15.1)
GFR SERPL CREATININE-BSD FRML MDRD: 71 ML/MIN/1.73SQ M
GLUCOSE SERPL-MCNC: 121 MG/DL (ref 65–140)
HCT VFR BLD AUTO: 41.9 % (ref 36.5–49.3)
HGB BLD-MCNC: 13.8 G/DL (ref 12–17)
IMM GRANULOCYTES # BLD AUTO: 0.05 THOUSAND/UL (ref 0–0.2)
IMM GRANULOCYTES NFR BLD AUTO: 1 % (ref 0–2)
LYMPHOCYTES # BLD AUTO: 3.45 THOUSANDS/ΜL (ref 0.6–4.47)
LYMPHOCYTES NFR BLD AUTO: 36 % (ref 14–44)
MCH RBC QN AUTO: 31.7 PG (ref 26.8–34.3)
MCHC RBC AUTO-ENTMCNC: 32.9 G/DL (ref 31.4–37.4)
MCV RBC AUTO: 96 FL (ref 82–98)
MONOCYTES # BLD AUTO: 0.85 THOUSAND/ΜL (ref 0.17–1.22)
MONOCYTES NFR BLD AUTO: 9 % (ref 4–12)
NEUTROPHILS # BLD AUTO: 4.57 THOUSANDS/ΜL (ref 1.85–7.62)
NEUTS SEG NFR BLD AUTO: 47 % (ref 43–75)
NRBC BLD AUTO-RTO: 0 /100 WBCS
PLATELET # BLD AUTO: 213 THOUSANDS/UL (ref 149–390)
PMV BLD AUTO: 11 FL (ref 8.9–12.7)
POTASSIUM SERPL-SCNC: 4.1 MMOL/L (ref 3.5–5.3)
PROT SERPL-MCNC: 6.9 G/DL (ref 6.4–8.2)
RBC # BLD AUTO: 4.36 MILLION/UL (ref 3.88–5.62)
SODIUM SERPL-SCNC: 140 MMOL/L (ref 136–145)
TROPONIN I SERPL-MCNC: 0.02 NG/ML
WBC # BLD AUTO: 9.62 THOUSAND/UL (ref 4.31–10.16)

## 2021-03-19 PROCEDURE — 71045 X-RAY EXAM CHEST 1 VIEW: CPT

## 2021-03-19 PROCEDURE — 80053 COMPREHEN METABOLIC PANEL: CPT | Performed by: EMERGENCY MEDICINE

## 2021-03-19 PROCEDURE — 99285 EMERGENCY DEPT VISIT HI MDM: CPT

## 2021-03-19 PROCEDURE — 99284 EMERGENCY DEPT VISIT MOD MDM: CPT | Performed by: EMERGENCY MEDICINE

## 2021-03-19 PROCEDURE — 93005 ELECTROCARDIOGRAM TRACING: CPT

## 2021-03-19 PROCEDURE — 84484 ASSAY OF TROPONIN QUANT: CPT | Performed by: EMERGENCY MEDICINE

## 2021-03-19 PROCEDURE — 85025 COMPLETE CBC W/AUTO DIFF WBC: CPT | Performed by: EMERGENCY MEDICINE

## 2021-03-19 PROCEDURE — 36415 COLL VENOUS BLD VENIPUNCTURE: CPT | Performed by: EMERGENCY MEDICINE

## 2021-03-19 RX ORDER — ACETAMINOPHEN 325 MG/1
975 TABLET ORAL ONCE
Status: COMPLETED | OUTPATIENT
Start: 2021-03-19 | End: 2021-03-19

## 2021-03-19 RX ORDER — LIDOCAINE HYDROCHLORIDE 20 MG/ML
10 SOLUTION OROPHARYNGEAL ONCE
Status: COMPLETED | OUTPATIENT
Start: 2021-03-19 | End: 2021-03-19

## 2021-03-19 RX ORDER — MAGNESIUM HYDROXIDE/ALUMINUM HYDROXICE/SIMETHICONE 120; 1200; 1200 MG/30ML; MG/30ML; MG/30ML
30 SUSPENSION ORAL ONCE
Status: COMPLETED | OUTPATIENT
Start: 2021-03-19 | End: 2021-03-19

## 2021-03-19 RX ADMIN — ALUMINUM HYDROXIDE, MAGNESIUM HYDROXIDE, AND SIMETHICONE 30 ML: 200; 200; 20 SUSPENSION ORAL at 21:29

## 2021-03-19 RX ADMIN — ACETAMINOPHEN 975 MG: 325 TABLET, FILM COATED ORAL at 23:07

## 2021-03-19 RX ADMIN — LIDOCAINE HYDROCHLORIDE 10 ML: 20 SOLUTION ORAL; TOPICAL at 21:29

## 2021-03-20 VITALS
WEIGHT: 224.87 LBS | TEMPERATURE: 97.6 F | HEART RATE: 71 BPM | RESPIRATION RATE: 19 BRPM | OXYGEN SATURATION: 96 % | SYSTOLIC BLOOD PRESSURE: 119 MMHG | HEIGHT: 76 IN | BODY MASS INDEX: 27.38 KG/M2 | DIASTOLIC BLOOD PRESSURE: 79 MMHG

## 2021-03-20 LAB — TROPONIN I SERPL-MCNC: <0.02 NG/ML

## 2021-03-20 PROCEDURE — 36415 COLL VENOUS BLD VENIPUNCTURE: CPT | Performed by: EMERGENCY MEDICINE

## 2021-03-20 PROCEDURE — 93005 ELECTROCARDIOGRAM TRACING: CPT

## 2021-03-20 PROCEDURE — 84484 ASSAY OF TROPONIN QUANT: CPT | Performed by: EMERGENCY MEDICINE

## 2021-03-20 NOTE — DISCHARGE INSTRUCTIONS
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
## 2021-03-20 NOTE — ED CARE HANDOFF
Emergency Department Sign Out Note        Sign out and transfer of care from Dr Brent Powers  See Separate Emergency Department note  The patient, Alfred Ramirez, was evaluated by the previous provider for CP   Workup Completed:  Labs Reviewed   COMPREHENSIVE METABOLIC PANEL - Abnormal       Result Value Ref Range Status    Sodium 140  136 - 145 mmol/L Final    Potassium 4 1  3 5 - 5 3 mmol/L Final    Chloride 105  100 - 108 mmol/L Final    CO2 28  21 - 32 mmol/L Final    ANION GAP 7  4 - 13 mmol/L Final    BUN 15  5 - 25 mg/dL Final    Creatinine 1 10  0 60 - 1 30 mg/dL Final    Comment: Standardized to IDMS reference method    Glucose 121  65 - 140 mg/dL Final    Comment: If the patient is fasting, the ADA then defines impaired fasting glucose as > 100 mg/dL and diabetes as > or equal to 123 mg/dL  Specimen collection should occur prior to Sulfasalazine administration due to the potential for falsely depressed results  Specimen collection should occur prior to Sulfapyridine administration due to the potential for falsely elevated results  Calcium 8 9  8 3 - 10 1 mg/dL Final    Corrected Calcium 9 5  8 3 - 10 1 mg/dL Final    AST 13  5 - 45 U/L Final    Comment: Specimen collection should occur prior to Sulfasalazine administration due to the potential for falsely depressed results  ALT 28  12 - 78 U/L Final    Comment: Specimen collection should occur prior to Sulfasalazine administration due to the potential for falsely depressed results  Alkaline Phosphatase 116  46 - 116 U/L Final    Total Protein 6 9  6 4 - 8 2 g/dL Final    Albumin 3 3 (*) 3 5 - 5 0 g/dL Final    Total Bilirubin 0 30  0 20 - 1 00 mg/dL Final    Comment: Use of this assay is not recommended for patients undergoing treatment with eltrombopag due to the potential for falsely elevated results      eGFR 71  ml/min/1 73sq m Final    Narrative:     [place] guidelines for Chronic Kidney Disease (CKD):    Stage 1 with normal or high GFR (GFR > 90 mL/min/1 73 square meters)    Stage 2 Mild CKD (GFR = 60-89 mL/min/1 73 square meters)    Stage 3A Moderate CKD (GFR = 45-59 mL/min/1 73 square meters)    Stage 3B Moderate CKD (GFR = 30-44 mL/min/1 73 square meters)    Stage 4 Severe CKD (GFR = 15-29 mL/min/1 73 square meters)    Stage 5 End Stage CKD (GFR <15 mL/min/1 73 square meters)  Note: GFR calculation is accurate only with a steady state creatinine   TROPONIN I - Normal    Troponin I 0 02  <=0 04 ng/mL Final    Comment: 3Autovalidation override  [employer] Chemistry analyzer 99% cutoff is > 0 04 ng/mL in network labs     o cTnI 99% cutoff is useful only when applied to patients in the clinical setting of myocardial ischemia   o cTnI 99% cutoff should be interpreted in the context of clinical history, ECG findings and possibly cardiac imaging to establish correct diagnosis  o cTnI 99% cutoff may be suggestive but clearly not indicative of a coronary event without the clinical setting of myocardial ischemia  TROPONIN I - Normal    Troponin I <0 02  <=0 04 ng/mL Final    Comment: 3Autovalidation override  [employer] Chemistry analyzer 99% cutoff is > 0 04 ng/mL in network labs     o cTnI 99% cutoff is useful only when applied to patients in the clinical setting of myocardial ischemia   o cTnI 99% cutoff should be interpreted in the context of clinical history, ECG findings and possibly cardiac imaging to establish correct diagnosis  o cTnI 99% cutoff may be suggestive but clearly not indicative of a coronary event without the clinical setting of myocardial ischemia       CBC AND DIFFERENTIAL    WBC 9 62  4 31 - 10 16 Thousand/uL Final    RBC 4 36  3 88 - 5 62 Million/uL Final    Hemoglobin 13 8  12 0 - 17 0 g/dL Final    Hematocrit 41 9  36 5 - 49 3 % Final    MCV 96  82 - 98 fL Final    MCH 31 7  26 8 - 34 3 pg Final    MCHC 32 9  31 4 - 37 4 g/dL Final    RDW 12 2  11 6 - 15 1 % Final    MPV 11 0  8 9 - 12 7 fL Final    Platelets 601  790 - 390 Thousands/uL Final    nRBC 0  /100 WBCs Final    Neutrophils Relative 47  43 - 75 % Final    Immat GRANS % 1  0 - 2 % Final    Lymphocytes Relative 36  14 - 44 % Final    Monocytes Relative 9  4 - 12 % Final    Eosinophils Relative 6  0 - 6 % Final    Basophils Relative 1  0 - 1 % Final    Neutrophils Absolute 4 57  1 85 - 7 62 Thousands/µL Final    Immature Grans Absolute 0 05  0 00 - 0 20 Thousand/uL Final    Lymphocytes Absolute 3 45  0 60 - 4 47 Thousands/µL Final    Monocytes Absolute 0 85  0 17 - 1 22 Thousand/µL Final    Eosinophils Absolute 0 60  0 00 - 0 61 Thousand/µL Final    Basophils Absolute 0 10  0 00 - 0 10 Thousands/µL Final      XR chest 1 view portable   ED Interpretation   No acute pulmonary pathology           ED Course / Workup Pending (followup): HEART Risk Score      Most Recent Value   Heart Score Risk Calculator   History  0 Filed at: 03/19/2021 2208   ECG  0 Filed at: 03/19/2021 2208   Age  1 Filed at: 03/19/2021 2208   Risk Factors  2 Filed at: 03/19/2021 2208   Troponin  0 Filed at: 03/19/2021 2208   HEART Score  3 Filed at: 03/19/2021 2208                                  ED Course as of Mar 20 0124   Sat Mar 20, 2021   0020 S/o from Dr Chantal Prabhakar  Pt with HEART score 3  Awaiting delta trop/ekg  If neg, can be d/c home to f/u with PCP, per signout  0033 Repeat ekg unchanged - NSR 75 BPM, 1st degree av block  [place] neg  D/c home to f/u with PCP  RTED if sx worsen          Procedures  MDM    Disposition  Final diagnoses:   Chest pain     Time reflects when diagnosis was documented in both MDM as applicable and the Disposition within this note     Time User Action Codes Description Comment    3/19/2021 10:09 PM Donia Brake Add [R07 9] Chest pain     3/20/2021  1:08 AM Osvaldo Joshua S Add [R07 89] Atypical chest pain     3/20/2021  1:08 AM Osvaldo Joshua S Remove [R07 89] Atypical chest pain       ED Disposition ED Disposition Condition Date/Time Comment    Discharge Stable Sat Mar 20, 2021  1:23 AM Patti Corral discharge to home/self care  Follow-up Information     Follow up With Specialties Details Why Contact Info Additional Information    your primary care provider  Schedule an appointment as soon as possible for a visit in 3 days for re-evaluation       [address] [place] [facility] Emergency Medicine Go to  If symptoms worsen, chest pain, shortness of breath, numbness, weakness [address] [facility] Emergency Department, [address] [place] [address] [address]        Patient's Medications   Discharge Prescriptions    No medications on file     No discharge procedures on file         ED Provider  Electronically Signed by     Elise Kerr MD  03/20/21 7486

## 2021-03-22 LAB
ATRIAL RATE: 75 BPM
ATRIAL RATE: 80 BPM
P AXIS: 48 DEGREES
P AXIS: 61 DEGREES
PR INTERVAL: 256 MS
PR INTERVAL: 258 MS
QRS AXIS: 61 DEGREES
QRS AXIS: 66 DEGREES
QRSD INTERVAL: 100 MS
QRSD INTERVAL: 108 MS
QT INTERVAL: 370 MS
QT INTERVAL: 398 MS
QTC INTERVAL: 426 MS
QTC INTERVAL: 444 MS
T WAVE AXIS: 45 DEGREES
T WAVE AXIS: 52 DEGREES
VENTRICULAR RATE: 75 BPM
VENTRICULAR RATE: 80 BPM

## 2021-03-22 PROCEDURE — 93010 ELECTROCARDIOGRAM REPORT: CPT | Performed by: INTERNAL MEDICINE

## 2021-11-15 ENCOUNTER — ANESTHESIA EVENT (INPATIENT)
Dept: PERIOP | Facility: HOSPITAL | Age: 66
DRG: 907 | End: 2021-11-15
Payer: COMMERCIAL

## 2021-11-15 ENCOUNTER — APPOINTMENT (EMERGENCY)
Dept: CT IMAGING | Facility: HOSPITAL | Age: 66
DRG: 907 | End: 2021-11-15
Payer: COMMERCIAL

## 2021-11-15 ENCOUNTER — ANESTHESIA (INPATIENT)
Dept: PERIOP | Facility: HOSPITAL | Age: 66
DRG: 907 | End: 2021-11-15
Payer: COMMERCIAL

## 2021-11-15 ENCOUNTER — HOSPITAL ENCOUNTER (INPATIENT)
Facility: HOSPITAL | Age: 66
LOS: 2 days | Discharge: HOME | DRG: 907 | End: 2021-11-17
Attending: EMERGENCY MEDICINE | Admitting: INTERNAL MEDICINE
Payer: COMMERCIAL

## 2021-11-15 ENCOUNTER — APPOINTMENT (INPATIENT)
Dept: RADIOLOGY | Facility: HOSPITAL | Age: 66
DRG: 907 | End: 2021-11-15
Payer: COMMERCIAL

## 2021-11-15 ENCOUNTER — TELEPHONE (OUTPATIENT)
Dept: RADIOLOGY | Facility: HOSPITAL | Age: 66
End: 2021-11-15

## 2021-11-15 DIAGNOSIS — J96.01 ACUTE RESPIRATORY FAILURE WITH HYPOXIA (HCC): ICD-10-CM

## 2021-11-15 DIAGNOSIS — T88.8XXA FLUID COLLECTION AT SURGICAL SITE, INITIAL ENCOUNTER: ICD-10-CM

## 2021-11-15 DIAGNOSIS — T81.9XXA POST-OPERATIVE COMPLICATION: Primary | ICD-10-CM

## 2021-11-15 DIAGNOSIS — J98.8 AIRWAY OBSTRUCTION: ICD-10-CM

## 2021-11-15 PROBLEM — J96.00 ACUTE RESPIRATORY FAILURE (HCC): Status: ACTIVE | Noted: 2021-11-15

## 2021-11-15 LAB
ANION GAP SERPL CALCULATED.3IONS-SCNC: 12 MMOL/L (ref 4–13)
BASE EXCESS BLDA CALC-SCNC: -2 MMOL/L (ref -2–3)
BASOPHILS # BLD AUTO: 0.1 THOUSANDS/ΜL (ref 0–0.1)
BASOPHILS NFR BLD AUTO: 1 % (ref 0–1)
BUN SERPL-MCNC: 16 MG/DL (ref 5–25)
CALCIUM SERPL-MCNC: 9.7 MG/DL (ref 8.3–10.1)
CHLORIDE SERPL-SCNC: 97 MMOL/L (ref 100–108)
CO2 SERPL-SCNC: 25 MMOL/L (ref 21–32)
CREAT SERPL-MCNC: 1.12 MG/DL (ref 0.6–1.3)
EOSINOPHIL # BLD AUTO: 0.46 THOUSAND/ΜL (ref 0–0.61)
EOSINOPHIL NFR BLD AUTO: 4 % (ref 0–6)
ERYTHROCYTE [DISTWIDTH] IN BLOOD BY AUTOMATED COUNT: 11.9 % (ref 11.6–15.1)
FIO2 GAS DIL.REBREATH: 50 L
GFR SERPL CREATININE-BSD FRML MDRD: 69 ML/MIN/1.73SQ M
GLUCOSE SERPL-MCNC: 127 MG/DL (ref 65–140)
GLUCOSE SERPL-MCNC: 131 MG/DL (ref 65–140)
HCO3 BLDA-SCNC: 22.7 MMOL/L (ref 22–28)
HCT VFR BLD AUTO: 50.5 % (ref 36.5–49.3)
HCT VFR BLD CALC: 41 % (ref 36.5–49.3)
HGB BLD-MCNC: 17.1 G/DL (ref 12–17)
HGB BLDA-MCNC: 13.9 G/DL (ref 12–17)
IMM GRANULOCYTES # BLD AUTO: 0.1 THOUSAND/UL (ref 0–0.2)
IMM GRANULOCYTES NFR BLD AUTO: 1 % (ref 0–2)
LYMPHOCYTES # BLD AUTO: 3.55 THOUSANDS/ΜL (ref 0.6–4.47)
LYMPHOCYTES NFR BLD AUTO: 32 % (ref 14–44)
MCH RBC QN AUTO: 31.5 PG (ref 26.8–34.3)
MCHC RBC AUTO-ENTMCNC: 33.9 G/DL (ref 31.4–37.4)
MCV RBC AUTO: 93 FL (ref 82–98)
MONOCYTES # BLD AUTO: 1.56 THOUSAND/ΜL (ref 0.17–1.22)
MONOCYTES NFR BLD AUTO: 14 % (ref 4–12)
NEUTROPHILS # BLD AUTO: 5.25 THOUSANDS/ΜL (ref 1.85–7.62)
NEUTS SEG NFR BLD AUTO: 48 % (ref 43–75)
NRBC BLD AUTO-RTO: 0 /100 WBCS
PCO2 BLD: 24 MMOL/L (ref 21–32)
PCO2 BLD: 39.7 MM HG (ref 36–44)
PH BLD: 7.37 [PH] (ref 7.35–7.45)
PLATELET # BLD AUTO: 298 THOUSANDS/UL (ref 149–390)
PMV BLD AUTO: 10.4 FL (ref 8.9–12.7)
PO2 BLD: 91 MM HG (ref 75–129)
POTASSIUM BLD-SCNC: 4.8 MMOL/L (ref 3.5–5.3)
POTASSIUM SERPL-SCNC: 4.7 MMOL/L (ref 3.5–5.3)
RBC # BLD AUTO: 5.43 MILLION/UL (ref 3.88–5.62)
SAO2 % BLD FROM PO2: 97 % (ref 60–85)
SODIUM BLD-SCNC: 133 MMOL/L (ref 136–145)
SODIUM SERPL-SCNC: 134 MMOL/L (ref 136–145)
SPECIMEN SOURCE: ABNORMAL
WBC # BLD AUTO: 11.02 THOUSAND/UL (ref 4.31–10.16)

## 2021-11-15 PROCEDURE — 82947 ASSAY GLUCOSE BLOOD QUANT: CPT

## 2021-11-15 PROCEDURE — 99222 1ST HOSP IP/OBS MODERATE 55: CPT | Performed by: OTOLARYNGOLOGY

## 2021-11-15 PROCEDURE — 31500 INSERT EMERGENCY AIRWAY: CPT | Performed by: PHYSICIAN ASSISTANT

## 2021-11-15 PROCEDURE — 94002 VENT MGMT INPAT INIT DAY: CPT

## 2021-11-15 PROCEDURE — G1004 CDSM NDSC: HCPCS

## 2021-11-15 PROCEDURE — 87070 CULTURE OTHR SPECIMN AEROBIC: CPT | Performed by: OTOLARYNGOLOGY

## 2021-11-15 PROCEDURE — 84295 ASSAY OF SERUM SODIUM: CPT

## 2021-11-15 PROCEDURE — 70491 CT SOFT TISSUE NECK W/DYE: CPT

## 2021-11-15 PROCEDURE — 36600 WITHDRAWAL OF ARTERIAL BLOOD: CPT

## 2021-11-15 PROCEDURE — 80048 BASIC METABOLIC PNL TOTAL CA: CPT | Performed by: PHYSICIAN ASSISTANT

## 2021-11-15 PROCEDURE — 36415 COLL VENOUS BLD VENIPUNCTURE: CPT | Performed by: PHYSICIAN ASSISTANT

## 2021-11-15 PROCEDURE — 99223 1ST HOSP IP/OBS HIGH 75: CPT | Performed by: PHYSICIAN ASSISTANT

## 2021-11-15 PROCEDURE — 87205 SMEAR GRAM STAIN: CPT | Performed by: OTOLARYNGOLOGY

## 2021-11-15 PROCEDURE — 96375 TX/PRO/DX INJ NEW DRUG ADDON: CPT

## 2021-11-15 PROCEDURE — 71045 X-RAY EXAM CHEST 1 VIEW: CPT

## 2021-11-15 PROCEDURE — 87076 CULTURE ANAEROBE IDENT EACH: CPT | Performed by: OTOLARYNGOLOGY

## 2021-11-15 PROCEDURE — 99291 CRITICAL CARE FIRST HOUR: CPT | Performed by: PHYSICIAN ASSISTANT

## 2021-11-15 PROCEDURE — 87075 CULTR BACTERIA EXCEPT BLOOD: CPT | Performed by: OTOLARYNGOLOGY

## 2021-11-15 PROCEDURE — 94760 N-INVAS EAR/PLS OXIMETRY 1: CPT

## 2021-11-15 PROCEDURE — 82803 BLOOD GASES ANY COMBINATION: CPT

## 2021-11-15 PROCEDURE — 93005 ELECTROCARDIOGRAM TRACING: CPT

## 2021-11-15 PROCEDURE — 96374 THER/PROPH/DIAG INJ IV PUSH: CPT

## 2021-11-15 PROCEDURE — 85014 HEMATOCRIT: CPT

## 2021-11-15 PROCEDURE — 85025 COMPLETE CBC W/AUTO DIFF WBC: CPT | Performed by: PHYSICIAN ASSISTANT

## 2021-11-15 PROCEDURE — 84132 ASSAY OF SERUM POTASSIUM: CPT

## 2021-11-15 PROCEDURE — 0WJ60ZZ INSPECTION OF NECK, OPEN APPROACH: ICD-10-PCS | Performed by: OTOLARYNGOLOGY

## 2021-11-15 PROCEDURE — 99285 EMERGENCY DEPT VISIT HI MDM: CPT

## 2021-11-15 PROCEDURE — 87102 FUNGUS ISOLATION CULTURE: CPT | Performed by: OTOLARYNGOLOGY

## 2021-11-15 RX ORDER — KETAMINE HYDROCHLORIDE 50 MG/ML
INJECTION, SOLUTION, CONCENTRATE INTRAMUSCULAR; INTRAVENOUS AS NEEDED
Status: DISCONTINUED | OUTPATIENT
Start: 2021-11-15 | End: 2021-11-15

## 2021-11-15 RX ORDER — DEXAMETHASONE SODIUM PHOSPHATE 4 MG/ML
INJECTION, SOLUTION INTRA-ARTICULAR; INTRALESIONAL; INTRAMUSCULAR; INTRAVENOUS; SOFT TISSUE AS NEEDED
Status: DISCONTINUED | OUTPATIENT
Start: 2021-11-15 | End: 2021-11-15

## 2021-11-15 RX ORDER — PROPOFOL 10 MG/ML
INJECTION, EMULSION INTRAVENOUS AS NEEDED
Status: DISCONTINUED | OUTPATIENT
Start: 2021-11-15 | End: 2021-11-15

## 2021-11-15 RX ORDER — HYDROMORPHONE HCL/PF 1 MG/ML
0.5 SYRINGE (ML) INJECTION ONCE
Status: COMPLETED | OUTPATIENT
Start: 2021-11-15 | End: 2021-11-15

## 2021-11-15 RX ORDER — SODIUM CHLORIDE, SODIUM GLUCONATE, SODIUM ACETATE, POTASSIUM CHLORIDE, MAGNESIUM CHLORIDE, SODIUM PHOSPHATE, DIBASIC, AND POTASSIUM PHOSPHATE .53; .5; .37; .037; .03; .012; .00082 G/100ML; G/100ML; G/100ML; G/100ML; G/100ML; G/100ML; G/100ML
100 INJECTION, SOLUTION INTRAVENOUS CONTINUOUS
Status: DISPENSED | OUTPATIENT
Start: 2021-11-15 | End: 2021-11-16

## 2021-11-15 RX ORDER — ONDANSETRON 2 MG/ML
4 INJECTION INTRAMUSCULAR; INTRAVENOUS ONCE
Status: COMPLETED | OUTPATIENT
Start: 2021-11-15 | End: 2021-11-15

## 2021-11-15 RX ORDER — ROCURONIUM BROMIDE 10 MG/ML
INJECTION, SOLUTION INTRAVENOUS AS NEEDED
Status: DISCONTINUED | OUTPATIENT
Start: 2021-11-15 | End: 2021-11-15

## 2021-11-15 RX ORDER — MAGNESIUM HYDROXIDE 1200 MG/15ML
LIQUID ORAL AS NEEDED
Status: DISCONTINUED | OUTPATIENT
Start: 2021-11-15 | End: 2021-11-15 | Stop reason: HOSPADM

## 2021-11-15 RX ORDER — PROPOFOL 10 MG/ML
INJECTION, EMULSION INTRAVENOUS CONTINUOUS PRN
Status: DISCONTINUED | OUTPATIENT
Start: 2021-11-15 | End: 2021-11-15

## 2021-11-15 RX ORDER — FENTANYL CITRATE 50 UG/ML
50 INJECTION, SOLUTION INTRAMUSCULAR; INTRAVENOUS ONCE
Status: COMPLETED | OUTPATIENT
Start: 2021-11-15 | End: 2021-11-15

## 2021-11-15 RX ORDER — SODIUM CHLORIDE, SODIUM GLUCONATE, SODIUM ACETATE, POTASSIUM CHLORIDE, MAGNESIUM CHLORIDE, SODIUM PHOSPHATE, DIBASIC, AND POTASSIUM PHOSPHATE .53; .5; .37; .037; .03; .012; .00082 G/100ML; G/100ML; G/100ML; G/100ML; G/100ML; G/100ML; G/100ML
500 INJECTION, SOLUTION INTRAVENOUS ONCE
Status: COMPLETED | OUTPATIENT
Start: 2021-11-15 | End: 2021-11-15

## 2021-11-15 RX ORDER — FENTANYL CITRATE 50 UG/ML
50 INJECTION, SOLUTION INTRAMUSCULAR; INTRAVENOUS EVERY 2 HOUR PRN
Status: DISCONTINUED | OUTPATIENT
Start: 2021-11-15 | End: 2021-11-16

## 2021-11-15 RX ORDER — EPHEDRINE SULFATE 50 MG/ML
INJECTION INTRAVENOUS AS NEEDED
Status: DISCONTINUED | OUTPATIENT
Start: 2021-11-15 | End: 2021-11-15

## 2021-11-15 RX ORDER — GLYCOPYRROLATE 0.2 MG/ML
INJECTION INTRAMUSCULAR; INTRAVENOUS AS NEEDED
Status: DISCONTINUED | OUTPATIENT
Start: 2021-11-15 | End: 2021-11-15

## 2021-11-15 RX ORDER — MIDAZOLAM HYDROCHLORIDE 2 MG/2ML
INJECTION, SOLUTION INTRAMUSCULAR; INTRAVENOUS AS NEEDED
Status: DISCONTINUED | OUTPATIENT
Start: 2021-11-15 | End: 2021-11-15

## 2021-11-15 RX ORDER — CHLORHEXIDINE GLUCONATE 0.12 MG/ML
15 RINSE ORAL EVERY 12 HOURS SCHEDULED
Status: DISCONTINUED | OUTPATIENT
Start: 2021-11-15 | End: 2021-11-16

## 2021-11-15 RX ORDER — PROPOFOL 10 MG/ML
5-50 INJECTION, EMULSION INTRAVENOUS
Status: DISCONTINUED | OUTPATIENT
Start: 2021-11-15 | End: 2021-11-16

## 2021-11-15 RX ORDER — CEFAZOLIN SODIUM 2 G/50ML
SOLUTION INTRAVENOUS AS NEEDED
Status: DISCONTINUED | OUTPATIENT
Start: 2021-11-15 | End: 2021-11-15

## 2021-11-15 RX ORDER — HYDROMORPHONE HCL/PF 1 MG/ML
0.2 SYRINGE (ML) INJECTION ONCE
Status: COMPLETED | OUTPATIENT
Start: 2021-11-15 | End: 2021-11-15

## 2021-11-15 RX ADMIN — FENTANYL CITRATE 50 MCG: 50 INJECTION, SOLUTION INTRAMUSCULAR; INTRAVENOUS at 20:41

## 2021-11-15 RX ADMIN — PROPOFOL 20 MG: 10 INJECTION, EMULSION INTRAVENOUS at 19:29

## 2021-11-15 RX ADMIN — MIDAZOLAM 2 MG: 1 INJECTION INTRAMUSCULAR; INTRAVENOUS at 19:28

## 2021-11-15 RX ADMIN — SODIUM CHLORIDE 1000 ML: 0.9 INJECTION, SOLUTION INTRAVENOUS at 21:12

## 2021-11-15 RX ADMIN — PROPOFOL 40 MCG/KG/MIN: 10 INJECTION, EMULSION INTRAVENOUS at 22:25

## 2021-11-15 RX ADMIN — ONDANSETRON 4 MG: 2 INJECTION INTRAMUSCULAR; INTRAVENOUS at 17:01

## 2021-11-15 RX ADMIN — FENTANYL CITRATE 50 MCG: 50 INJECTION, SOLUTION INTRAMUSCULAR; INTRAVENOUS at 21:36

## 2021-11-15 RX ADMIN — DEXAMETHASONE SODIUM PHOSPHATE 8 MG: 4 INJECTION, SOLUTION INTRAMUSCULAR; INTRAVENOUS at 19:46

## 2021-11-15 RX ADMIN — PROPOFOL 20 MG: 10 INJECTION, EMULSION INTRAVENOUS at 19:30

## 2021-11-15 RX ADMIN — IOHEXOL 85 ML: 350 INJECTION, SOLUTION INTRAVENOUS at 15:53

## 2021-11-15 RX ADMIN — SODIUM CHLORIDE, SODIUM GLUCONATE, SODIUM ACETATE, POTASSIUM CHLORIDE, MAGNESIUM CHLORIDE, SODIUM PHOSPHATE, DIBASIC, AND POTASSIUM PHOSPHATE 100 ML/HR: .53; .5; .37; .037; .03; .012; .00082 INJECTION, SOLUTION INTRAVENOUS at 21:00

## 2021-11-15 RX ADMIN — GLYCOPYRROLATE 0.15 MG: 0.2 INJECTION, SOLUTION INTRAMUSCULAR; INTRAVENOUS at 19:28

## 2021-11-15 RX ADMIN — ROCURONIUM BROMIDE 50 MG: 10 INJECTION, SOLUTION INTRAVENOUS at 19:34

## 2021-11-15 RX ADMIN — PROPOFOL 80 MCG/KG/MIN: 10 INJECTION, EMULSION INTRAVENOUS at 19:54

## 2021-11-15 RX ADMIN — SODIUM CHLORIDE, SODIUM GLUCONATE, SODIUM ACETATE, POTASSIUM CHLORIDE, MAGNESIUM CHLORIDE, SODIUM PHOSPHATE, DIBASIC, AND POTASSIUM PHOSPHATE 500 ML: .53; .5; .37; .037; .03; .012; .00082 INJECTION, SOLUTION INTRAVENOUS at 21:12

## 2021-11-15 RX ADMIN — CEFAZOLIN SODIUM 2000 MG: 2 SOLUTION INTRAVENOUS at 19:21

## 2021-11-15 RX ADMIN — PROPOFOL 20 MG: 10 INJECTION, EMULSION INTRAVENOUS at 19:28

## 2021-11-15 RX ADMIN — PROPOFOL 100 MG: 10 INJECTION, EMULSION INTRAVENOUS at 19:34

## 2021-11-15 RX ADMIN — KETAMINE HYDROCHLORIDE 10 MG: 50 INJECTION, SOLUTION INTRAMUSCULAR; INTRAVENOUS at 19:31

## 2021-11-15 RX ADMIN — KETAMINE HYDROCHLORIDE 10 MG: 50 INJECTION, SOLUTION INTRAMUSCULAR; INTRAVENOUS at 19:30

## 2021-11-15 RX ADMIN — HYDROMORPHONE HYDROCHLORIDE 0.2 MG: 1 INJECTION, SOLUTION INTRAMUSCULAR; INTRAVENOUS; SUBCUTANEOUS at 17:01

## 2021-11-15 RX ADMIN — KETAMINE HYDROCHLORIDE 10 MG: 50 INJECTION, SOLUTION INTRAMUSCULAR; INTRAVENOUS at 19:29

## 2021-11-15 RX ADMIN — PROPOFOL 40 MG: 10 INJECTION, EMULSION INTRAVENOUS at 19:33

## 2021-11-15 RX ADMIN — CHLORHEXIDINE GLUCONATE 0.12% ORAL RINSE 15 ML: 1.2 LIQUID ORAL at 21:58

## 2021-11-15 RX ADMIN — KETAMINE HYDROCHLORIDE 20 MG: 50 INJECTION, SOLUTION INTRAMUSCULAR; INTRAVENOUS at 19:28

## 2021-11-15 RX ADMIN — SODIUM CHLORIDE: 0.9 INJECTION, SOLUTION INTRAVENOUS at 19:21

## 2021-11-15 RX ADMIN — HYDROMORPHONE HYDROCHLORIDE 0.5 MG: 1 INJECTION, SOLUTION INTRAMUSCULAR; INTRAVENOUS; SUBCUTANEOUS at 22:51

## 2021-11-15 RX ADMIN — EPHEDRINE SULFATE 15 MG: 50 INJECTION INTRAVENOUS at 19:41

## 2021-11-16 LAB
ANION GAP SERPL CALCULATED.3IONS-SCNC: 11 MMOL/L (ref 4–13)
ATRIAL RATE: 67 BPM
BUN SERPL-MCNC: 18 MG/DL (ref 5–25)
CALCIUM SERPL-MCNC: 8.7 MG/DL (ref 8.3–10.1)
CHLORIDE SERPL-SCNC: 97 MMOL/L (ref 100–108)
CO2 SERPL-SCNC: 24 MMOL/L (ref 21–32)
CREAT SERPL-MCNC: 0.99 MG/DL (ref 0.6–1.3)
ERYTHROCYTE [DISTWIDTH] IN BLOOD BY AUTOMATED COUNT: 11.8 % (ref 11.6–15.1)
GFR SERPL CREATININE-BSD FRML MDRD: 80 ML/MIN/1.73SQ M
GLUCOSE SERPL-MCNC: 164 MG/DL (ref 65–140)
HCT VFR BLD AUTO: 45.4 % (ref 36.5–49.3)
HGB BLD-MCNC: 15.3 G/DL (ref 12–17)
MCH RBC QN AUTO: 31.7 PG (ref 26.8–34.3)
MCHC RBC AUTO-ENTMCNC: 33.7 G/DL (ref 31.4–37.4)
MCV RBC AUTO: 94 FL (ref 82–98)
P AXIS: 25 DEGREES
PLATELET # BLD AUTO: 251 THOUSANDS/UL (ref 149–390)
PMV BLD AUTO: 10.3 FL (ref 8.9–12.7)
POTASSIUM SERPL-SCNC: 5.2 MMOL/L (ref 3.5–5.3)
PR INTERVAL: 246 MS
QRS AXIS: 52 DEGREES
QRSD INTERVAL: 162 MS
QT INTERVAL: 434 MS
QTC INTERVAL: 458 MS
RBC # BLD AUTO: 4.82 MILLION/UL (ref 3.88–5.62)
SODIUM SERPL-SCNC: 132 MMOL/L (ref 136–145)
T WAVE AXIS: 50 DEGREES
VENTRICULAR RATE: 67 BPM
WBC # BLD AUTO: 8.69 THOUSAND/UL (ref 4.31–10.16)

## 2021-11-16 PROCEDURE — 94150 VITAL CAPACITY TEST: CPT

## 2021-11-16 PROCEDURE — 85027 COMPLETE CBC AUTOMATED: CPT | Performed by: PHYSICIAN ASSISTANT

## 2021-11-16 PROCEDURE — 99233 SBSQ HOSP IP/OBS HIGH 50: CPT | Performed by: INTERNAL MEDICINE

## 2021-11-16 PROCEDURE — 94760 N-INVAS EAR/PLS OXIMETRY 1: CPT

## 2021-11-16 PROCEDURE — 80048 BASIC METABOLIC PNL TOTAL CA: CPT | Performed by: PHYSICIAN ASSISTANT

## 2021-11-16 PROCEDURE — 93010 ELECTROCARDIOGRAM REPORT: CPT | Performed by: INTERNAL MEDICINE

## 2021-11-16 PROCEDURE — 10140 I&D HMTMA SEROMA/FLUID COLLJ: CPT | Performed by: OTOLARYNGOLOGY

## 2021-11-16 PROCEDURE — 94003 VENT MGMT INPAT SUBQ DAY: CPT

## 2021-11-16 RX ORDER — PRAMIPEXOLE DIHYDROCHLORIDE 0.5 MG/1
0.5 TABLET ORAL DAILY PRN
COMMUNITY

## 2021-11-16 RX ORDER — DIVALPROEX SODIUM 500 MG/1
1000 TABLET, EXTENDED RELEASE ORAL
Status: DISCONTINUED | OUTPATIENT
Start: 2021-11-16 | End: 2021-11-17 | Stop reason: HOSPADM

## 2021-11-16 RX ORDER — LORAZEPAM 0.5 MG/1
0.5 TABLET ORAL 4 TIMES DAILY
Status: DISCONTINUED | OUTPATIENT
Start: 2021-11-16 | End: 2021-11-17 | Stop reason: HOSPADM

## 2021-11-16 RX ORDER — OMEPRAZOLE 10 MG/1
20 CAPSULE, DELAYED RELEASE ORAL 2 TIMES DAILY
COMMUNITY

## 2021-11-16 RX ORDER — CYCLOBENZAPRINE HCL 10 MG
5 TABLET ORAL 2 TIMES DAILY PRN
COMMUNITY

## 2021-11-16 RX ORDER — ACETAMINOPHEN 325 MG/1
650 TABLET ORAL EVERY 6 HOURS PRN
Status: DISCONTINUED | OUTPATIENT
Start: 2021-11-16 | End: 2021-11-17 | Stop reason: HOSPADM

## 2021-11-16 RX ORDER — DEXTROAMPHETAMINE SACCHARATE, AMPHETAMINE ASPARTATE, DEXTROAMPHETAMINE SULFATE AND AMPHETAMINE SULFATE 5; 5; 5; 5 MG/1; MG/1; MG/1; MG/1
20 TABLET ORAL DAILY
Status: DISCONTINUED | OUTPATIENT
Start: 2021-11-16 | End: 2021-11-17 | Stop reason: HOSPADM

## 2021-11-16 RX ORDER — ALBUTEROL SULFATE 90 UG/1
2 AEROSOL, METERED RESPIRATORY (INHALATION) EVERY 8 HOURS PRN
COMMUNITY

## 2021-11-16 RX ORDER — VILAZODONE HYDROCHLORIDE 20 MG/1
40 TABLET ORAL
COMMUNITY

## 2021-11-16 RX ORDER — BUPROPION HYDROCHLORIDE 300 MG/1
300 TABLET ORAL DAILY
COMMUNITY

## 2021-11-16 RX ORDER — BUPROPION HYDROCHLORIDE 300 MG/1
300 TABLET ORAL DAILY
Status: DISCONTINUED | OUTPATIENT
Start: 2021-11-16 | End: 2021-11-17 | Stop reason: HOSPADM

## 2021-11-16 RX ORDER — VILAZODONE HYDROCHLORIDE 40 MG/1
40 TABLET ORAL
Status: DISCONTINUED | OUTPATIENT
Start: 2021-11-16 | End: 2021-11-17 | Stop reason: HOSPADM

## 2021-11-16 RX ORDER — DIPHENOXYLATE HYDROCHLORIDE AND ATROPINE SULFATE 2.5; .025 MG/1; MG/1
1 TABLET ORAL DAILY
COMMUNITY

## 2021-11-16 RX ORDER — LORAZEPAM 1 MG/1
1 TABLET ORAL 4 TIMES DAILY
COMMUNITY

## 2021-11-16 RX ORDER — SUMATRIPTAN 50 MG/1
50 TABLET, FILM COATED ORAL EVERY 2 HOUR PRN
COMMUNITY

## 2021-11-16 RX ORDER — PANTOPRAZOLE SODIUM 20 MG/1
20 TABLET, DELAYED RELEASE ORAL
Status: DISCONTINUED | OUTPATIENT
Start: 2021-11-16 | End: 2021-11-17

## 2021-11-16 RX ORDER — ATORVASTATIN CALCIUM 40 MG/1
80 TABLET, FILM COATED ORAL
Status: DISCONTINUED | OUTPATIENT
Start: 2021-11-16 | End: 2021-11-17 | Stop reason: HOSPADM

## 2021-11-16 RX ORDER — DEXTROAMPHETAMINE SACCHARATE, AMPHETAMINE ASPARTATE MONOHYDRATE, DEXTROAMPHETAMINE SULFATE AND AMPHETAMINE SULFATE 5; 5; 5; 5 MG/1; MG/1; MG/1; MG/1
40 CAPSULE, EXTENDED RELEASE ORAL EVERY MORNING
COMMUNITY

## 2021-11-16 RX ORDER — DEXTROAMPHETAMINE SACCHARATE, AMPHETAMINE ASPARTATE MONOHYDRATE, DEXTROAMPHETAMINE SULFATE AND AMPHETAMINE SULFATE 5; 5; 5; 5 MG/1; MG/1; MG/1; MG/1
20 CAPSULE, EXTENDED RELEASE ORAL EVERY MORNING
COMMUNITY

## 2021-11-16 RX ORDER — GABAPENTIN 300 MG/1
600 CAPSULE ORAL 3 TIMES DAILY
Status: DISCONTINUED | OUTPATIENT
Start: 2021-11-16 | End: 2021-11-17 | Stop reason: HOSPADM

## 2021-11-16 RX ORDER — DEXTROAMPHETAMINE SACCHARATE, AMPHETAMINE ASPARTATE, DEXTROAMPHETAMINE SULFATE AND AMPHETAMINE SULFATE 5; 5; 5; 5 MG/1; MG/1; MG/1; MG/1
40 TABLET ORAL DAILY
Status: DISCONTINUED | OUTPATIENT
Start: 2021-11-17 | End: 2021-11-17 | Stop reason: HOSPADM

## 2021-11-16 RX ORDER — PRAMIPEXOLE DIHYDROCHLORIDE 0.5 MG/1
0.75 TABLET ORAL
Status: DISCONTINUED | OUTPATIENT
Start: 2021-11-16 | End: 2021-11-17 | Stop reason: HOSPADM

## 2021-11-16 RX ORDER — DIVALPROEX SODIUM 500 MG/1
1000 TABLET, EXTENDED RELEASE ORAL
COMMUNITY

## 2021-11-16 RX ORDER — LANOLIN ALCOHOL/MO/W.PET/CERES
CREAM (GRAM) TOPICAL AS NEEDED
COMMUNITY

## 2021-11-16 RX ORDER — GABAPENTIN 600 MG/1
600 TABLET ORAL 3 TIMES DAILY
COMMUNITY

## 2021-11-16 RX ORDER — ASPIRIN 81 MG/1
81 TABLET, CHEWABLE ORAL DAILY
Status: DISCONTINUED | OUTPATIENT
Start: 2021-11-16 | End: 2021-11-17 | Stop reason: HOSPADM

## 2021-11-16 RX ADMIN — PROPOFOL 45 MCG/KG/MIN: 10 INJECTION, EMULSION INTRAVENOUS at 05:02

## 2021-11-16 RX ADMIN — LORAZEPAM 0.5 MG: 0.5 TABLET ORAL at 11:02

## 2021-11-16 RX ADMIN — PRAMIPEXOLE DIHYDROCHLORIDE 0.75 MG: 0.5 TABLET ORAL at 21:01

## 2021-11-16 RX ADMIN — PROPOFOL 50 MCG/KG/MIN: 10 INJECTION, EMULSION INTRAVENOUS at 01:18

## 2021-11-16 RX ADMIN — GABAPENTIN 600 MG: 300 CAPSULE ORAL at 21:02

## 2021-11-16 RX ADMIN — DEXMEDETOMIDINE HYDROCHLORIDE 0.1 MCG/KG/HR: 100 INJECTION, SOLUTION INTRAVENOUS at 07:10

## 2021-11-16 RX ADMIN — PANTOPRAZOLE SODIUM 20 MG: 20 TABLET, DELAYED RELEASE ORAL at 16:04

## 2021-11-16 RX ADMIN — FENTANYL CITRATE 50 MCG: 50 INJECTION, SOLUTION INTRAMUSCULAR; INTRAVENOUS at 07:03

## 2021-11-16 RX ADMIN — LORAZEPAM 0.5 MG: 0.5 TABLET ORAL at 17:14

## 2021-11-16 RX ADMIN — LORAZEPAM 0.5 MG: 0.5 TABLET ORAL at 21:02

## 2021-11-16 RX ADMIN — ATORVASTATIN CALCIUM 80 MG: 40 TABLET, FILM COATED ORAL at 16:04

## 2021-11-16 RX ADMIN — SODIUM CHLORIDE, SODIUM GLUCONATE, SODIUM ACETATE, POTASSIUM CHLORIDE, MAGNESIUM CHLORIDE, SODIUM PHOSPHATE, DIBASIC, AND POTASSIUM PHOSPHATE 100 ML/HR: .53; .5; .37; .037; .03; .012; .00082 INJECTION, SOLUTION INTRAVENOUS at 02:55

## 2021-11-16 RX ADMIN — DEXTROAMPHETAMINE SACCHARATE, AMPHETAMINE ASPARTATE, DEXTROAMPHETAMINE SULFATE AND AMPHETAMINE SULFATE 20 MG: 5; 5; 5; 5 TABLET ORAL at 16:04

## 2021-11-16 RX ADMIN — VILAZODONE HYDROCHLORIDE 40 MG: 40 TABLET ORAL at 11:17

## 2021-11-16 RX ADMIN — ASPIRIN 81 MG CHEWABLE TABLET 81 MG: 81 TABLET CHEWABLE at 11:02

## 2021-11-16 RX ADMIN — GABAPENTIN 600 MG: 300 CAPSULE ORAL at 16:08

## 2021-11-16 RX ADMIN — BUPROPION HYDROCHLORIDE 300 MG: 300 TABLET, FILM COATED, EXTENDED RELEASE ORAL at 11:02

## 2021-11-16 RX ADMIN — DIVALPROEX SODIUM 1000 MG: 500 TABLET, EXTENDED RELEASE ORAL at 21:01

## 2021-11-16 RX ADMIN — ACETAMINOPHEN 650 MG: 325 TABLET, FILM COATED ORAL at 11:10

## 2021-11-16 RX ADMIN — GABAPENTIN 600 MG: 300 CAPSULE ORAL at 11:02

## 2021-11-16 RX ADMIN — CARIPRAZINE 1.5 MG: 1.5 CAPSULE, GELATIN COATED ORAL at 11:17

## 2021-11-16 RX ADMIN — PROPOFOL 40 MCG/KG/MIN: 10 INJECTION, EMULSION INTRAVENOUS at 07:50

## 2021-11-17 VITALS
HEIGHT: 76 IN | OXYGEN SATURATION: 97 % | WEIGHT: 238.1 LBS | RESPIRATION RATE: 13 BRPM | SYSTOLIC BLOOD PRESSURE: 133 MMHG | DIASTOLIC BLOOD PRESSURE: 79 MMHG | TEMPERATURE: 98.6 F | HEART RATE: 77 BPM | BODY MASS INDEX: 28.99 KG/M2

## 2021-11-17 PROBLEM — J96.00 ACUTE RESPIRATORY FAILURE (HCC): Status: RESOLVED | Noted: 2021-11-15 | Resolved: 2021-11-17

## 2021-11-17 LAB
ANION GAP SERPL CALCULATED.3IONS-SCNC: 10 MMOL/L (ref 4–13)
BASOPHILS # BLD AUTO: 0.07 THOUSANDS/ΜL (ref 0–0.1)
BASOPHILS NFR BLD AUTO: 1 % (ref 0–1)
BUN SERPL-MCNC: 17 MG/DL (ref 5–25)
CALCIUM SERPL-MCNC: 8.9 MG/DL (ref 8.3–10.1)
CHLORIDE SERPL-SCNC: 104 MMOL/L (ref 100–108)
CO2 SERPL-SCNC: 26 MMOL/L (ref 21–32)
CREAT SERPL-MCNC: 0.92 MG/DL (ref 0.6–1.3)
EOSINOPHIL # BLD AUTO: 0.22 THOUSAND/ΜL (ref 0–0.61)
EOSINOPHIL NFR BLD AUTO: 3 % (ref 0–6)
ERYTHROCYTE [DISTWIDTH] IN BLOOD BY AUTOMATED COUNT: 11.9 % (ref 11.6–15.1)
GFR SERPL CREATININE-BSD FRML MDRD: 88 ML/MIN/1.73SQ M
GLUCOSE SERPL-MCNC: 114 MG/DL (ref 65–140)
HCT VFR BLD AUTO: 42.3 % (ref 36.5–49.3)
HGB BLD-MCNC: 14.2 G/DL (ref 12–17)
IMM GRANULOCYTES # BLD AUTO: 0.06 THOUSAND/UL (ref 0–0.2)
IMM GRANULOCYTES NFR BLD AUTO: 1 % (ref 0–2)
LYMPHOCYTES # BLD AUTO: 3.66 THOUSANDS/ΜL (ref 0.6–4.47)
LYMPHOCYTES NFR BLD AUTO: 41 % (ref 14–44)
MCH RBC QN AUTO: 31.5 PG (ref 26.8–34.3)
MCHC RBC AUTO-ENTMCNC: 33.6 G/DL (ref 31.4–37.4)
MCV RBC AUTO: 94 FL (ref 82–98)
MONOCYTES # BLD AUTO: 1.04 THOUSAND/ΜL (ref 0.17–1.22)
MONOCYTES NFR BLD AUTO: 12 % (ref 4–12)
NEUTROPHILS # BLD AUTO: 3.82 THOUSANDS/ΜL (ref 1.85–7.62)
NEUTS SEG NFR BLD AUTO: 42 % (ref 43–75)
NRBC BLD AUTO-RTO: 0 /100 WBCS
PLATELET # BLD AUTO: 222 THOUSANDS/UL (ref 149–390)
PMV BLD AUTO: 10.2 FL (ref 8.9–12.7)
POTASSIUM SERPL-SCNC: 4.1 MMOL/L (ref 3.5–5.3)
RBC # BLD AUTO: 4.51 MILLION/UL (ref 3.88–5.62)
SODIUM SERPL-SCNC: 140 MMOL/L (ref 136–145)
WBC # BLD AUTO: 8.87 THOUSAND/UL (ref 4.31–10.16)

## 2021-11-17 PROCEDURE — NC001 PR NO CHARGE: Performed by: INTERNAL MEDICINE

## 2021-11-17 PROCEDURE — 92610 EVALUATE SWALLOWING FUNCTION: CPT

## 2021-11-17 PROCEDURE — 99238 HOSP IP/OBS DSCHRG MGMT 30/<: CPT | Performed by: NURSE PRACTITIONER

## 2021-11-17 PROCEDURE — 80048 BASIC METABOLIC PNL TOTAL CA: CPT | Performed by: NURSE PRACTITIONER

## 2021-11-17 PROCEDURE — 85025 COMPLETE CBC W/AUTO DIFF WBC: CPT | Performed by: NURSE PRACTITIONER

## 2021-11-17 RX ORDER — ONDANSETRON 2 MG/ML
4 INJECTION INTRAMUSCULAR; INTRAVENOUS EVERY 6 HOURS PRN
Status: DISCONTINUED | OUTPATIENT
Start: 2021-11-17 | End: 2021-11-17 | Stop reason: HOSPADM

## 2021-11-17 RX ORDER — OMEPRAZOLE 20 MG/1
20 CAPSULE, DELAYED RELEASE ORAL
Status: DISCONTINUED | OUTPATIENT
Start: 2021-11-17 | End: 2021-11-17

## 2021-11-17 RX ORDER — PANTOPRAZOLE SODIUM 20 MG/1
20 TABLET, DELAYED RELEASE ORAL
Status: DISCONTINUED | OUTPATIENT
Start: 2021-11-17 | End: 2021-11-17 | Stop reason: HOSPADM

## 2021-11-17 RX ADMIN — CARIPRAZINE 1.5 MG: 1.5 CAPSULE, GELATIN COATED ORAL at 09:59

## 2021-11-17 RX ADMIN — BUPROPION HYDROCHLORIDE 300 MG: 300 TABLET, FILM COATED, EXTENDED RELEASE ORAL at 09:56

## 2021-11-17 RX ADMIN — LORAZEPAM 0.5 MG: 0.5 TABLET ORAL at 09:56

## 2021-11-17 RX ADMIN — PANTOPRAZOLE SODIUM 20 MG: 20 TABLET, DELAYED RELEASE ORAL at 05:50

## 2021-11-17 RX ADMIN — DEXTROAMPHETAMINE SACCHARATE, AMPHETAMINE ASPARTATE, DEXTROAMPHETAMINE SULFATE AND AMPHETAMINE SULFATE 40 MG: 5; 5; 5; 5 TABLET ORAL at 09:56

## 2021-11-17 RX ADMIN — ACETAMINOPHEN 650 MG: 325 TABLET, FILM COATED ORAL at 10:00

## 2021-11-17 RX ADMIN — ASPIRIN 81 MG CHEWABLE TABLET 81 MG: 81 TABLET CHEWABLE at 09:56

## 2021-11-17 RX ADMIN — VILAZODONE HYDROCHLORIDE 40 MG: 40 TABLET ORAL at 09:59

## 2021-11-17 RX ADMIN — GABAPENTIN 600 MG: 300 CAPSULE ORAL at 09:56

## 2021-11-17 RX ADMIN — LORAZEPAM 0.5 MG: 0.5 TABLET ORAL at 11:08

## 2021-11-18 ENCOUNTER — HOSPITAL ENCOUNTER (EMERGENCY)
Facility: HOSPITAL | Age: 66
Discharge: ANOTHER HOSPITAL | End: 2021-11-18
Attending: EMERGENCY MEDICINE | Admitting: EMERGENCY MEDICINE
Payer: COMMERCIAL

## 2021-11-18 VITALS
TEMPERATURE: 97 F | RESPIRATION RATE: 16 BRPM | SYSTOLIC BLOOD PRESSURE: 146 MMHG | OXYGEN SATURATION: 98 % | HEART RATE: 74 BPM | DIASTOLIC BLOOD PRESSURE: 70 MMHG

## 2021-11-18 DIAGNOSIS — G89.18 POSTOPERATIVE PAIN: ICD-10-CM

## 2021-11-18 DIAGNOSIS — T81.30XA WOUND DEHISCENCE: Primary | ICD-10-CM

## 2021-11-18 DIAGNOSIS — R58 BLEEDING: ICD-10-CM

## 2021-11-18 LAB
BACTERIA TISS AEROBE CULT: ABNORMAL
GRAM STN SPEC: ABNORMAL

## 2021-11-18 PROCEDURE — 96361 HYDRATE IV INFUSION ADD-ON: CPT

## 2021-11-18 PROCEDURE — 99284 EMERGENCY DEPT VISIT MOD MDM: CPT | Performed by: EMERGENCY MEDICINE

## 2021-11-18 PROCEDURE — 96374 THER/PROPH/DIAG INJ IV PUSH: CPT

## 2021-11-18 PROCEDURE — C9113 INJ PANTOPRAZOLE SODIUM, VIA: HCPCS | Performed by: PHYSICIAN ASSISTANT

## 2021-11-18 PROCEDURE — 99284 EMERGENCY DEPT VISIT MOD MDM: CPT

## 2021-11-18 RX ORDER — ONDANSETRON 2 MG/ML
4 INJECTION INTRAMUSCULAR; INTRAVENOUS ONCE
Status: COMPLETED | OUTPATIENT
Start: 2021-11-18 | End: 2021-11-18

## 2021-11-18 RX ORDER — PANTOPRAZOLE SODIUM 40 MG/1
40 INJECTION, POWDER, FOR SOLUTION INTRAVENOUS ONCE
Status: COMPLETED | OUTPATIENT
Start: 2021-11-18 | End: 2021-11-18

## 2021-11-18 RX ADMIN — PANTOPRAZOLE SODIUM 40 MG: 40 INJECTION, POWDER, FOR SOLUTION INTRAVENOUS at 17:51

## 2021-11-18 RX ADMIN — SODIUM CHLORIDE 1000 ML: 0.9 INJECTION, SOLUTION INTRAVENOUS at 17:52

## 2021-11-20 LAB — BACTERIA SPEC ANAEROBE CULT: ABNORMAL

## 2021-12-14 ENCOUNTER — HOSPITAL ENCOUNTER (EMERGENCY)
Facility: HOSPITAL | Age: 66
Discharge: HOME | End: 2021-12-14
Attending: EMERGENCY MEDICINE
Payer: MEDICARE

## 2021-12-14 VITALS
SYSTOLIC BLOOD PRESSURE: 116 MMHG | RESPIRATION RATE: 16 BRPM | WEIGHT: 230 LBS | TEMPERATURE: 99 F | BODY MASS INDEX: 28.01 KG/M2 | HEART RATE: 68 BPM | DIASTOLIC BLOOD PRESSURE: 56 MMHG | HEIGHT: 76 IN | OXYGEN SATURATION: 95 %

## 2021-12-14 DIAGNOSIS — M79.601 RIGHT ARM PAIN: Primary | ICD-10-CM

## 2021-12-14 PROCEDURE — 99283 EMERGENCY DEPT VISIT LOW MDM: CPT

## 2021-12-14 PROCEDURE — 99282 EMERGENCY DEPT VISIT SF MDM: CPT | Performed by: EMERGENCY MEDICINE

## 2021-12-14 RX ORDER — OXYCODONE HYDROCHLORIDE 5 MG/1
5 CAPSULE ORAL EVERY 6 HOURS PRN
COMMUNITY

## 2021-12-20 LAB — FUNGUS SPEC CULT: NORMAL

## 2022-02-11 ENCOUNTER — APPOINTMENT (EMERGENCY)
Dept: RADIOLOGY | Facility: HOSPITAL | Age: 67
End: 2022-02-11
Payer: COMMERCIAL

## 2022-02-11 ENCOUNTER — HOSPITAL ENCOUNTER (EMERGENCY)
Facility: HOSPITAL | Age: 67
Discharge: HOME | End: 2022-02-11
Attending: EMERGENCY MEDICINE
Payer: COMMERCIAL

## 2022-02-11 VITALS
BODY MASS INDEX: 27.4 KG/M2 | WEIGHT: 225 LBS | OXYGEN SATURATION: 96 % | HEART RATE: 74 BPM | TEMPERATURE: 97.9 F | HEIGHT: 76 IN | DIASTOLIC BLOOD PRESSURE: 68 MMHG | RESPIRATION RATE: 17 BRPM | SYSTOLIC BLOOD PRESSURE: 112 MMHG

## 2022-02-11 DIAGNOSIS — R07.89 ATYPICAL CHEST PAIN: ICD-10-CM

## 2022-02-11 DIAGNOSIS — R42 DIZZINESS: Primary | ICD-10-CM

## 2022-02-11 DIAGNOSIS — E86.0 DEHYDRATION: ICD-10-CM

## 2022-02-11 LAB
2HR DELTA HS TROPONIN: -1 NG/L
ALBUMIN SERPL BCP-MCNC: 3.7 G/DL (ref 3.5–5)
ALP SERPL-CCNC: 128 U/L (ref 46–116)
ALT SERPL W P-5'-P-CCNC: 42 U/L (ref 12–78)
ANION GAP SERPL CALCULATED.3IONS-SCNC: 10 MMOL/L (ref 4–13)
ANION GAP SERPL CALCULATED.3IONS-SCNC: 11 MMOL/L (ref 4–13)
AST SERPL W P-5'-P-CCNC: 17 U/L (ref 5–45)
ATRIAL RATE: 88 BPM
ATRIAL RATE: 96 BPM
BASOPHILS # BLD AUTO: 0.07 THOUSANDS/ΜL (ref 0–0.1)
BASOPHILS NFR BLD AUTO: 1 % (ref 0–1)
BILIRUB SERPL-MCNC: 0.3 MG/DL (ref 0.2–1)
BUN SERPL-MCNC: 18 MG/DL (ref 5–25)
BUN SERPL-MCNC: 21 MG/DL (ref 5–25)
CALCIUM SERPL-MCNC: 7.3 MG/DL (ref 8.3–10.1)
CALCIUM SERPL-MCNC: 8.6 MG/DL (ref 8.3–10.1)
CARDIAC TROPONIN I PNL SERPL HS: 5 NG/L
CARDIAC TROPONIN I PNL SERPL HS: 6 NG/L
CHLORIDE SERPL-SCNC: 102 MMOL/L (ref 100–108)
CHLORIDE SERPL-SCNC: 109 MMOL/L (ref 100–108)
CO2 SERPL-SCNC: 22 MMOL/L (ref 21–32)
CO2 SERPL-SCNC: 25 MMOL/L (ref 21–32)
CREAT SERPL-MCNC: 1.16 MG/DL (ref 0.6–1.3)
CREAT SERPL-MCNC: 1.5 MG/DL (ref 0.6–1.3)
EOSINOPHIL # BLD AUTO: 0.32 THOUSAND/ΜL (ref 0–0.61)
EOSINOPHIL NFR BLD AUTO: 4 % (ref 0–6)
ERYTHROCYTE [DISTWIDTH] IN BLOOD BY AUTOMATED COUNT: 12.3 % (ref 11.6–15.1)
GFR SERPL CREATININE-BSD FRML MDRD: 48 ML/MIN/1.73SQ M
GFR SERPL CREATININE-BSD FRML MDRD: 66 ML/MIN/1.73SQ M
GLUCOSE SERPL-MCNC: 117 MG/DL (ref 65–140)
GLUCOSE SERPL-MCNC: 172 MG/DL (ref 65–140)
HCT VFR BLD AUTO: 44.6 % (ref 36.5–49.3)
HGB BLD-MCNC: 15 G/DL (ref 12–17)
IMM GRANULOCYTES # BLD AUTO: 0.04 THOUSAND/UL (ref 0–0.2)
IMM GRANULOCYTES NFR BLD AUTO: 1 % (ref 0–2)
LYMPHOCYTES # BLD AUTO: 3.74 THOUSANDS/ΜL (ref 0.6–4.47)
LYMPHOCYTES NFR BLD AUTO: 44 % (ref 14–44)
MCH RBC QN AUTO: 31.1 PG (ref 26.8–34.3)
MCHC RBC AUTO-ENTMCNC: 33.6 G/DL (ref 31.4–37.4)
MCV RBC AUTO: 93 FL (ref 82–98)
MONOCYTES # BLD AUTO: 0.76 THOUSAND/ΜL (ref 0.17–1.22)
MONOCYTES NFR BLD AUTO: 9 % (ref 4–12)
NEUTROPHILS # BLD AUTO: 3.46 THOUSANDS/ΜL (ref 1.85–7.62)
NEUTS SEG NFR BLD AUTO: 41 % (ref 43–75)
NRBC BLD AUTO-RTO: 0 /100 WBCS
P AXIS: 43 DEGREES
PLATELET # BLD AUTO: 211 THOUSANDS/UL (ref 149–390)
PMV BLD AUTO: 10.6 FL (ref 8.9–12.7)
POTASSIUM SERPL-SCNC: 3.6 MMOL/L (ref 3.5–5.3)
POTASSIUM SERPL-SCNC: 4.1 MMOL/L (ref 3.5–5.3)
PR INTERVAL: 234 MS
PR INTERVAL: 242 MS
PROT SERPL-MCNC: 7.4 G/DL (ref 6.4–8.2)
QRS AXIS: 124 DEGREES
QRS AXIS: 51 DEGREES
QRSD INTERVAL: 144 MS
QRSD INTERVAL: 150 MS
QT INTERVAL: 368 MS
QT INTERVAL: 394 MS
QTC INTERVAL: 464 MS
QTC INTERVAL: 476 MS
RBC # BLD AUTO: 4.82 MILLION/UL (ref 3.88–5.62)
SODIUM SERPL-SCNC: 138 MMOL/L (ref 136–145)
SODIUM SERPL-SCNC: 141 MMOL/L (ref 136–145)
T WAVE AXIS: 160 DEGREES
T WAVE AXIS: 32 DEGREES
VALPROATE SERPL-MCNC: <3 UG/ML (ref 50–100)
VENTRICULAR RATE: 88 BPM
VENTRICULAR RATE: 96 BPM
WBC # BLD AUTO: 8.39 THOUSAND/UL (ref 4.31–10.16)

## 2022-02-11 PROCEDURE — 99285 EMERGENCY DEPT VISIT HI MDM: CPT | Performed by: EMERGENCY MEDICINE

## 2022-02-11 PROCEDURE — 80053 COMPREHEN METABOLIC PANEL: CPT | Performed by: EMERGENCY MEDICINE

## 2022-02-11 PROCEDURE — 85025 COMPLETE CBC W/AUTO DIFF WBC: CPT | Performed by: EMERGENCY MEDICINE

## 2022-02-11 PROCEDURE — 80048 BASIC METABOLIC PNL TOTAL CA: CPT | Performed by: EMERGENCY MEDICINE

## 2022-02-11 PROCEDURE — 71045 X-RAY EXAM CHEST 1 VIEW: CPT

## 2022-02-11 PROCEDURE — 84484 ASSAY OF TROPONIN QUANT: CPT | Performed by: EMERGENCY MEDICINE

## 2022-02-11 PROCEDURE — 96360 HYDRATION IV INFUSION INIT: CPT

## 2022-02-11 PROCEDURE — 96361 HYDRATE IV INFUSION ADD-ON: CPT

## 2022-02-11 PROCEDURE — 36415 COLL VENOUS BLD VENIPUNCTURE: CPT

## 2022-02-11 PROCEDURE — 99285 EMERGENCY DEPT VISIT HI MDM: CPT

## 2022-02-11 PROCEDURE — 93010 ELECTROCARDIOGRAM REPORT: CPT | Performed by: INTERNAL MEDICINE

## 2022-02-11 PROCEDURE — 80164 ASSAY DIPROPYLACETIC ACD TOT: CPT | Performed by: EMERGENCY MEDICINE

## 2022-02-11 PROCEDURE — 93005 ELECTROCARDIOGRAM TRACING: CPT

## 2022-02-11 RX ADMIN — SODIUM CHLORIDE 1000 ML: 0.9 INJECTION, SOLUTION INTRAVENOUS at 15:44

## 2022-02-11 NOTE — ED PROVIDER NOTES
History  Chief Complaint   Patient presents with    Chest Pain     Pt reports dizziness and shaking when he stands x 3 days  Chest pressure, denies n/v/d  Denies trauma, denies falls  This is 73-year-old male who presents for evaluation of dizziness when he stands up for past 3 days denies any alcohol use or abuse  Also some chest pressure that started 3 hours ago  No nausea vomiting or diarrhea  He is a smoker with a history of hypercholesterolemia  No history of DVT or PE      History provided by:  Patient  Medical Problem  Location:   generalized  Quality:   shakiness and substernal chest pressure  Severity:  Moderate  Onset quality:  Gradual  Duration:  3 days  Timing:  Constant  Progression:  Waxing and waning  Chronicity:  New  Context:   generalized shakiness when standing and chest  pressure  Worsened by:   standing  Associated symptoms: chest pain    Associated symptoms: no cough and no shortness of breath        Prior to Admission Medications   Prescriptions Last Dose Informant Patient Reported? Taking?    Emollient (eucerin) lotion   Yes No   Sig: Apply topically as needed for dry skin   LORazepam (ATIVAN) 1 mg tablet   Yes No   Sig: Take 1 mg by mouth 4 (four) times a day   SUMAtriptan (IMITREX) 50 mg tablet   Yes No   Sig: Take 50 mg by mouth every 2 (two) hours as needed for migraine (MAX DOSE 200MG in 24 HOURS)   albuterol (PROVENTIL HFA,VENTOLIN HFA) 90 mcg/act inhaler   Yes No   Sig: Inhale 2 puffs every 8 (eight) hours as needed for wheezing   amphetamine-dextroamphetamine (ADDERALL XR) 20 MG 24 hr capsule   Yes No   Sig: Take 40 mg by mouth every morning   amphetamine-dextroamphetamine (ADDERALL XR) 20 MG 24 hr capsule   Yes No   Sig: Take 20 mg by mouth every morning Take 20 mg daily at 3PM    aspirin 81 mg chewable tablet   Yes No   Sig: Chew 81 mg daily   atorvastatin (LIPITOR) 40 mg tablet   Yes No   Sig: Take 80 mg by mouth daily with dinner     buPROPion (WELLBUTRIN XL) 300 mg 24 hr tablet   Yes No   Sig: Take 300 mg by mouth daily   cariprazine (Vraylar) 1 5 MG capsule   Yes No   Sig: Take 1 5 mg by mouth daily   cyclobenzaprine (FLEXERIL) 10 mg tablet   Yes No   Sig: Take 5 mg by mouth 2 (two) times a day as needed for muscle spasms   divalproex sodium (DEPAKOTE ER) 500 mg 24 hr tablet   Yes No   Sig: Take 1,000 mg by mouth daily at bedtime     gabapentin (NEURONTIN) 600 MG tablet   Yes No   Sig: Take 600 mg by mouth 3 (three) times a day   multivitamin (THERAGRAN) TABS   Yes No   Sig: Take 1 tablet by mouth daily   omeprazole (PriLOSEC) 10 mg delayed release capsule   Yes No   Sig: Take 20 mg by mouth 2 (two) times a day   oxyCODONE (OXY-IR) 5 MG capsule   Yes No   Sig: Take 5 mg by mouth every 6 (six) hours as needed for moderate pain   pramipexole (MIRAPEX) 0 5 mg tablet   Yes No   Sig: Take 0 75 mg by mouth daily at bedtime     pramipexole (MIRAPEX) 0 5 mg tablet   Yes No   Sig: Take 0 5 mg by mouth daily as needed (Take in MORNING PRN)   vilazodone (Viibryd) 20 mg tablet   Yes No   Sig: Take 40 mg by mouth daily with breakfast      Facility-Administered Medications: None       Past Medical History:   Diagnosis Date    PTSD (post-traumatic stress disorder)        Past Surgical History:   Procedure Laterality Date    ATRIAL CARDIAC PACEMAKER INSERTION  2019    CARDIAC PACEMAKER PLACEMENT      CHOLECYSTECTOMY      DENTAL IMPLANT      NECK EXPLORATION Right 11/15/2021    Procedure: EXPLORATION NECK;  Surgeon: Dayana Hickman MD;  Location: [facility];  Service: ENT    NECK SURGERY         History reviewed  No pertinent family history  I have reviewed and agree with the history as documented      E-Cigarette/Vaping    E-Cigarette Use Never User      E-Cigarette/Vaping Substances    Nicotine No     THC No     CBD No     Flavoring No     Other No     Unknown No      Social History     Tobacco Use    Smoking status: Current Every Day Smoker     Packs/day: 0 25     Types: Cigarettes  Smokeless tobacco: Never Used   Vaping Use    Vaping Use: Never used   Substance Use Topics    Alcohol use: Never    Drug use: Never       Review of Systems   Respiratory: Negative for cough and shortness of breath  Cardiovascular: Positive for chest pain  Musculoskeletal: Positive for neck pain  Neurological: Positive for dizziness and tremors  All other systems reviewed and are negative  Physical Exam  Physical Exam  Vitals and nursing note reviewed  Constitutional:       General: He is not in acute distress  Appearance: He is not ill-appearing, toxic-appearing or diaphoretic  HENT:      Head: Normocephalic and atraumatic  Right Ear: Tympanic membrane, ear canal and external ear normal       Left Ear: Tympanic membrane, ear canal and external ear normal    Eyes:      General: No scleral icterus  Right eye: No discharge  Left eye: No discharge  Extraocular Movements: Extraocular movements intact  Pupils: Pupils are equal, round, and reactive to light  Cardiovascular:      Rate and Rhythm: Normal rate and regular rhythm  Pulses: Normal pulses  Heart sounds: Normal heart sounds  No murmur heard  No friction rub  No gallop  Pulmonary:      Effort: Pulmonary effort is normal  No respiratory distress  Breath sounds: Normal breath sounds  No stridor  No wheezing, rhonchi or rales  Abdominal:      General: There is no distension  Palpations: Abdomen is soft  Tenderness: There is no abdominal tenderness  There is no guarding or rebound  Musculoskeletal:         General: No swelling, tenderness, deformity or signs of injury  Normal range of motion  Cervical back: Normal range of motion and neck supple  No rigidity  Right lower leg: No edema  Left lower leg: No edema  Skin:     General: Skin is warm and dry  Coloration: Skin is not jaundiced  Findings: No bruising, erythema or rash     Neurological: General: No focal deficit present  Mental Status: He is alert and oriented to person, place, and time  Cranial Nerves: No cranial nerve deficit  Sensory: No sensory deficit  Motor: No weakness        Coordination: Coordination normal       Gait: Gait normal       Comments: Slight bilateral hand tremor   Psychiatric:         Mood and Affect: Mood normal          Behavior: Behavior normal          Vital Signs  ED Triage Vitals [02/11/22 1445]   Temperature Pulse Respirations Blood Pressure SpO2   97 9 °F (36 6 °C) 97 16 120/73 98 %      Temp Source Heart Rate Source Patient Position - Orthostatic VS BP Location FiO2 (%)   Temporal Monitor Lying Left arm --      Pain Score       4           Vitals:    02/11/22 1445 02/11/22 1600 02/11/22 1730   BP: 120/73 115/71 112/68   Pulse: 97 77 74   Patient Position - Orthostatic VS: Lying Lying Lying         Visual Acuity      ED Medications  Medications   sodium chloride 0 9 % bolus 1,000 mL (1,000 mL Intravenous New Bag 2/11/22 1544)       Diagnostic Studies  Results Reviewed     Procedure Component Value Units Date/Time    HS Troponin I 2hr [[id number]]  (Normal) Collected: 02/11/22 1726    Lab Status: Final result Specimen: Blood from Arm, Left Updated: 02/11/22 1801     hs TnI 2hr 5 ng/L      Delta 2hr hsTnI -1 ng/L     Basic metabolic panel [[id number]]  (Abnormal) Collected: 02/11/22 1730    Lab Status: Final result Specimen: Blood from Arm, Left Updated: 02/11/22 1749     Sodium 141 mmol/L      Potassium 3 6 mmol/L      Chloride 109 mmol/L      CO2 22 mmol/L      ANION GAP 10 mmol/L      BUN 18 mg/dL      Creatinine 1 16 mg/dL      Glucose 117 mg/dL      Calcium 7 3 mg/dL      eGFR 66 ml/min/1 73sq m     Narrative:      [facility] guidelines for Chronic Kidney Disease (CKD):     Stage 1 with normal or high GFR (GFR > 90 mL/min/1 73 square meters)    Stage 2 Mild CKD (GFR = 60-89 mL/min/1 73 square meters)    Stage 3A Moderate CKD (GFR = 45-59 mL/min/1 73 square meters)    Stage 3B Moderate CKD (GFR = 30-44 mL/min/1 73 square meters)    Stage 4 Severe CKD (GFR = 15-29 mL/min/1 73 square meters)    Stage 5 End Stage CKD (GFR <15 mL/min/1 73 square meters)  Note: GFR calculation is accurate only with a steady state creatinine    Trauma tubes on hold [[id number]] Collected: 02/11/22 1515    Lab Status: Final result Specimen: Blood from Arm, Left Updated: 02/11/22 1702    Narrative: The following orders were created for panel order Trauma tubes on hold  Procedure                               Abnormality         Status                     ---------                               -----------         ------                     Otto Benedict Top on LDST[[id number]]                           Final result               Gold top on hold[[id number]]                                 Final result                 Please view results for these tests on the individual orders      Valproic acid level, total [[id number]]  (Abnormal) Collected: 02/11/22 1515    Lab Status: Final result Specimen: Blood from Arm, Left Updated: 02/11/22 1614     Valproic Acid, Total <3 ug/mL     HS Troponin I 4hr [[id number]]     Lab Status: No result Specimen: Blood     Comprehensive metabolic panel [[id number]]  (Abnormal) Collected: 02/11/22 1515    Lab Status: Final result Specimen: Blood from Arm, Left Updated: 02/11/22 1601     Sodium 138 mmol/L      Potassium 4 1 mmol/L      Chloride 102 mmol/L      CO2 25 mmol/L      ANION GAP 11 mmol/L      BUN 21 mg/dL      Creatinine 1 50 mg/dL      Glucose 172 mg/dL      Calcium 8 6 mg/dL      AST 17 U/L      ALT 42 U/L      Alkaline Phosphatase 128 U/L      Total Protein 7 4 g/dL      Albumin 3 7 g/dL      Total Bilirubin 0 30 mg/dL      eGFR 48 ml/min/1 73sq m     Narrative:      [facility] guidelines for Chronic Kidney Disease (CKD):     Stage 1 with normal or high GFR (GFR > 90 mL/min/1 73 square meters)    Stage 2 Mild CKD (GFR = 60-89 mL/min/1 73 square meters)    Stage 3A Moderate CKD (GFR = 45-59 mL/min/1 73 square meters)    Stage 3B Moderate CKD (GFR = 30-44 mL/min/1 73 square meters)    Stage 4 Severe CKD (GFR = 15-29 mL/min/1 73 square meters)    Stage 5 End Stage CKD (GFR <15 mL/min/1 73 square meters)  Note: GFR calculation is accurate only with a steady state creatinine    HS Troponin 0hr (reflex protocol) [[id number]]  (Normal) Collected: 02/11/22 1515    Lab Status: Final result Specimen: Blood from Arm, Left Updated: 02/11/22 1557     hs TnI 0hr 6 ng/L     CBC and differential [[id number]]  (Abnormal) Collected: 02/11/22 1515    Lab Status: Final result Specimen: Blood from Arm, Left Updated: 02/11/22 1528     WBC 8 39 Thousand/uL      RBC 4 82 Million/uL      Hemoglobin 15 0 g/dL      Hematocrit 44 6 %      MCV 93 fL      MCH 31 1 pg      MCHC 33 6 g/dL      RDW 12 3 %      MPV 10 6 fL      Platelets 322 Thousands/uL      nRBC 0 /100 WBCs      Neutrophils Relative 41 %      Immat GRANS % 1 %      Lymphocytes Relative 44 %      Monocytes Relative 9 %      Eosinophils Relative 4 %      Basophils Relative 1 %      Neutrophils Absolute 3 46 Thousands/µL      Immature Grans Absolute 0 04 Thousand/uL      Lymphocytes Absolute 3 74 Thousands/µL      Monocytes Absolute 0 76 Thousand/µL      Eosinophils Absolute 0 32 Thousand/µL      Basophils Absolute 0 07 Thousands/µL                  XR chest 1 view portable   ED Interpretation by Ayleen Davison DO (02/11 9446)   No acute infiltrate or pneumothorax pacemaker wires intact      Final Result by Trina Dorantes MD (02/11 1615)      No acute cardiopulmonary disease                    Workstation performed: [id number]                    Procedures  ECG 12 Lead Documentation Only    Date/Time: 2/11/2022 4:09 PM  Performed by: Ayleen Davison DO  Authorized by: Ayleen Davison DO     ECG reviewed by me, the ED Provider: yes    Patient location:  ED  Rate: ECG rate:  96  Rhythm:     Rhythm: sinus rhythm    Conduction:     Conduction: abnormal      Abnormal conduction: complete RBBB and 1st degree    T waves:     T waves: inverted      Inverted:  V1, V2 and V3 (Similar but slightly more pronounce than EKG from November 15, 2021)             ED Course  ED Course as of 02/11/22 1810   Fri Feb 11, 2022   1642  Patient sleeping in no acute distress  1807  Repeat  creatinine is down to 1 16 and delta troponin is -1             HEART Risk Score      Most Recent Value   Heart Score Risk Calculator    History 0 Filed at: 02/11/2022 1610   ECG 1 Filed at: 02/11/2022 1610   Age 1 Filed at: 02/11/2022 1610   Risk Factors 1 Filed at: 02/11/2022 1610   Troponin 0 Filed at: 02/11/2022 1610   HEART Score 3 Filed at: 02/11/2022 1610                        SBIRT 22yo+      Most Recent Value   SBIRT (25 yo +)    In order to provide better care to our patients, we are screening all of our patients for alcohol and drug use  Would it be okay to ask you these screening questions? Yes Filed at: 02/11/2022 1513   Initial Alcohol Screen: US AUDIT-C     1  How often do you have a drink containing alcohol? 0 Filed at: 02/11/2022 1513   2  How many drinks containing alcohol do you have on a typical day you are drinking? 0 Filed at: 02/11/2022 1513   3a  Male UNDER 65: How often do you have five or more drinks on one occasion? 0 Filed at: 02/11/2022 1513   3b  FEMALE Any Age, or MALE 65+: How often do you have 4 or more drinks on one occassion? 0 Filed at: 02/11/2022 1513   Audit-C Score 0 Filed at: 02/11/2022 1513   LISA: How many times in the past year have you    Used an illegal drug or used a prescription medication for non-medical reasons? Never Filed at: 02/11/2022 1513                    MDM  Number of Diagnoses or Management Options  Diagnosis management comments:  Dizziness with standing rule out orthostatic dehydration severe anemia  Patient denies alcohol abuse   Atypical chest pain workup in progress EKG chest x-ray and lab work  Amount and/or Complexity of Data Reviewed  Clinical lab tests: ordered  Tests in the radiology section of CPT®: ordered        Disposition  Final diagnoses:   Dizziness   Dehydration   Atypical chest pain     Time reflects when diagnosis was documented in both MDM as applicable and the Disposition within this note     Time User Action Codes Description Comment    2/11/2022  6:08 PM Bhavin Bale Add [R42] Dizziness     2/11/2022  6:08 PM Bi Burrs [E86 0] Dehydration     2/11/2022  6:08 PM Bhavin Bale Add [R07 89] Atypical chest pain       ED Disposition     ED Disposition Condition Date/Time Comment    Discharge Stable Fri Feb 11, 2022  6:08 PM Lynette Yoncalla discharge to home/self care              Follow-up Information     Follow up With Specialties Details Why Contact Info Additional [facility]  further evaluation of chest pain [address] [facility] [address]  [address]  Cardiology [phone], [address] [facility], [place], [place], [address]  In 1 week primary care physician [phone]        [facility] Emergency Department Emergency Medicine  As needed, If symptoms worsen [address] [address]  [phone] [facility] Emergency Department, [address] [facility], [facility], [address]          Patient's Medications   Discharge Prescriptions    No medications on file           PDMP Review       Value Time User    PDMP Reviewed  Yes 3/5/2021 12:55 PM Home Frances MD          ED Provider  Electronically Signed by           Nestor Way DO  02/11/22 5639

## 2022-03-09 ENCOUNTER — HOSPITAL ENCOUNTER (EMERGENCY)
Facility: HOSPITAL | Age: 67
Discharge: HOME | End: 2022-03-10
Attending: EMERGENCY MEDICINE
Payer: COMMERCIAL

## 2022-03-09 ENCOUNTER — APPOINTMENT (EMERGENCY)
Dept: CT IMAGING | Facility: HOSPITAL | Age: 67
End: 2022-03-09
Payer: COMMERCIAL

## 2022-03-09 VITALS
HEART RATE: 72 BPM | WEIGHT: 230 LBS | RESPIRATION RATE: 20 BRPM | DIASTOLIC BLOOD PRESSURE: 72 MMHG | HEIGHT: 76 IN | SYSTOLIC BLOOD PRESSURE: 132 MMHG | BODY MASS INDEX: 28.01 KG/M2 | OXYGEN SATURATION: 95 % | TEMPERATURE: 97.5 F

## 2022-03-09 DIAGNOSIS — K76.0 FATTY LIVER: ICD-10-CM

## 2022-03-09 DIAGNOSIS — R10.9 ABDOMINAL PAIN: Primary | ICD-10-CM

## 2022-03-09 DIAGNOSIS — K63.89 EPIPLOIC APPENDAGITIS: ICD-10-CM

## 2022-03-09 LAB
ALBUMIN SERPL BCP-MCNC: 3.6 G/DL (ref 3.5–5)
ALP SERPL-CCNC: 136 U/L (ref 46–116)
ALT SERPL W P-5'-P-CCNC: 39 U/L (ref 12–78)
ANION GAP SERPL CALCULATED.3IONS-SCNC: 7 MMOL/L (ref 4–13)
AST SERPL W P-5'-P-CCNC: 45 U/L (ref 5–45)
BASOPHILS # BLD AUTO: 0.06 THOUSANDS/ΜL (ref 0–0.1)
BASOPHILS NFR BLD AUTO: 1 % (ref 0–1)
BILIRUB SERPL-MCNC: 0.4 MG/DL (ref 0.2–1)
BILIRUB UR QL STRIP: NEGATIVE
BUN SERPL-MCNC: 13 MG/DL (ref 5–25)
CALCIUM SERPL-MCNC: 8.2 MG/DL (ref 8.3–10.1)
CHLORIDE SERPL-SCNC: 104 MMOL/L (ref 100–108)
CLARITY UR: CLEAR
CO2 SERPL-SCNC: 25 MMOL/L (ref 21–32)
COLOR UR: YELLOW
CREAT SERPL-MCNC: 0.96 MG/DL (ref 0.6–1.3)
EOSINOPHIL # BLD AUTO: 0.22 THOUSAND/ΜL (ref 0–0.61)
EOSINOPHIL NFR BLD AUTO: 3 % (ref 0–6)
ERYTHROCYTE [DISTWIDTH] IN BLOOD BY AUTOMATED COUNT: 12.2 % (ref 11.6–15.1)
GFR SERPL CREATININE-BSD FRML MDRD: 83 ML/MIN/1.73SQ M
GLUCOSE SERPL-MCNC: 117 MG/DL (ref 65–140)
GLUCOSE UR STRIP-MCNC: NEGATIVE MG/DL
HCT VFR BLD AUTO: 40.4 % (ref 36.5–49.3)
HGB BLD-MCNC: 14.1 G/DL (ref 12–17)
HGB UR QL STRIP.AUTO: NEGATIVE
IMM GRANULOCYTES # BLD AUTO: 0.03 THOUSAND/UL (ref 0–0.2)
IMM GRANULOCYTES NFR BLD AUTO: 0 % (ref 0–2)
KETONES UR STRIP-MCNC: NEGATIVE MG/DL
LACTATE SERPL-SCNC: 1.2 MMOL/L (ref 0.5–2)
LEUKOCYTE ESTERASE UR QL STRIP: NEGATIVE
LIPASE SERPL-CCNC: 67 U/L (ref 73–393)
LYMPHOCYTES # BLD AUTO: 3.49 THOUSANDS/ΜL (ref 0.6–4.47)
LYMPHOCYTES NFR BLD AUTO: 44 % (ref 14–44)
MCH RBC QN AUTO: 31.3 PG (ref 26.8–34.3)
MCHC RBC AUTO-ENTMCNC: 34.9 G/DL (ref 31.4–37.4)
MCV RBC AUTO: 90 FL (ref 82–98)
MONOCYTES # BLD AUTO: 0.87 THOUSAND/ΜL (ref 0.17–1.22)
MONOCYTES NFR BLD AUTO: 11 % (ref 4–12)
NEUTROPHILS # BLD AUTO: 3.21 THOUSANDS/ΜL (ref 1.85–7.62)
NEUTS SEG NFR BLD AUTO: 41 % (ref 43–75)
NITRITE UR QL STRIP: NEGATIVE
NRBC BLD AUTO-RTO: 0 /100 WBCS
PH UR STRIP.AUTO: 6 [PH]
PLATELET # BLD AUTO: 208 THOUSANDS/UL (ref 149–390)
PMV BLD AUTO: 10.2 FL (ref 8.9–12.7)
POTASSIUM SERPL-SCNC: 4.8 MMOL/L (ref 3.5–5.3)
PROT SERPL-MCNC: 7.1 G/DL (ref 6.4–8.2)
PROT UR STRIP-MCNC: NEGATIVE MG/DL
RBC # BLD AUTO: 4.51 MILLION/UL (ref 3.88–5.62)
SODIUM SERPL-SCNC: 136 MMOL/L (ref 136–145)
SP GR UR STRIP.AUTO: 1.02 (ref 1–1.03)
UROBILINOGEN UR QL STRIP.AUTO: 1 E.U./DL
WBC # BLD AUTO: 7.88 THOUSAND/UL (ref 4.31–10.16)

## 2022-03-09 PROCEDURE — 36415 COLL VENOUS BLD VENIPUNCTURE: CPT

## 2022-03-09 PROCEDURE — 83690 ASSAY OF LIPASE: CPT | Performed by: EMERGENCY MEDICINE

## 2022-03-09 PROCEDURE — 80053 COMPREHEN METABOLIC PANEL: CPT | Performed by: EMERGENCY MEDICINE

## 2022-03-09 PROCEDURE — 81003 URINALYSIS AUTO W/O SCOPE: CPT | Performed by: EMERGENCY MEDICINE

## 2022-03-09 PROCEDURE — 99284 EMERGENCY DEPT VISIT MOD MDM: CPT | Performed by: EMERGENCY MEDICINE

## 2022-03-09 PROCEDURE — 96374 THER/PROPH/DIAG INJ IV PUSH: CPT

## 2022-03-09 PROCEDURE — 74177 CT ABD & PELVIS W/CONTRAST: CPT

## 2022-03-09 PROCEDURE — G1004 CDSM NDSC: HCPCS

## 2022-03-09 PROCEDURE — 85025 COMPLETE CBC W/AUTO DIFF WBC: CPT | Performed by: EMERGENCY MEDICINE

## 2022-03-09 PROCEDURE — 83605 ASSAY OF LACTIC ACID: CPT | Performed by: EMERGENCY MEDICINE

## 2022-03-09 PROCEDURE — 99284 EMERGENCY DEPT VISIT MOD MDM: CPT

## 2022-03-09 PROCEDURE — 96361 HYDRATE IV INFUSION ADD-ON: CPT

## 2022-03-09 RX ORDER — FENTANYL CITRATE 50 UG/ML
50 INJECTION, SOLUTION INTRAMUSCULAR; INTRAVENOUS ONCE
Status: COMPLETED | OUTPATIENT
Start: 2022-03-09 | End: 2022-03-09

## 2022-03-09 RX ADMIN — SODIUM CHLORIDE 1000 ML: 9 INJECTION, SOLUTION INTRAVENOUS at 22:02

## 2022-03-09 RX ADMIN — IOHEXOL 100 ML: 350 INJECTION, SOLUTION INTRAVENOUS at 23:18

## 2022-03-09 RX ADMIN — FENTANYL CITRATE 50 MCG: 50 INJECTION, SOLUTION INTRAMUSCULAR; INTRAVENOUS at 22:02

## 2022-03-10 RX ORDER — ONDANSETRON 4 MG/1
4 TABLET, ORALLY DISINTEGRATING ORAL EVERY 6 HOURS PRN
Qty: 20 TABLET | Refills: 0 | Status: SHIPPED | OUTPATIENT
Start: 2022-03-10
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
## 2022-03-10 NOTE — ED CARE HANDOFF
Emergency Department Sign Out Note        Sign out and transfer of care from Dr Neville Nava  See Separate Emergency Department note  The patient, Tran Kerr, was evaluated by the previous provider for abd pain  Workup Completed:  CT abdomen pelvis with contrast   Final Result      Findings consistent with epiploic appendagitis at the sigmoid colon            Workstation performed: [id number]            Labs Reviewed   CBC AND DIFFERENTIAL - Abnormal       Result Value Ref Range Status    WBC 7 88  4 31 - 10 16 Thousand/uL Final    RBC 4 51  3 88 - 5 62 Million/uL Final    Hemoglobin 14 1  12 0 - 17 0 g/dL Final    Hematocrit 40 4  36 5 - 49 3 % Final    MCV 90  82 - 98 fL Final    MCH 31 3  26 8 - 34 3 pg Final    MCHC 34 9  31 4 - 37 4 g/dL Final    RDW 12 2  11 6 - 15 1 % Final    MPV 10 2  8 9 - 12 7 fL Final    Platelets 345  771 - 390 Thousands/uL Final    nRBC 0  /100 WBCs Final    Neutrophils Relative 41 (*) 43 - 75 % Final    Immat GRANS % 0  0 - 2 % Final    Lymphocytes Relative 44  14 - 44 % Final    Monocytes Relative 11  4 - 12 % Final    Eosinophils Relative 3  0 - 6 % Final    Basophils Relative 1  0 - 1 % Final    Neutrophils Absolute 3 21  1 85 - 7 62 Thousands/µL Final    Immature Grans Absolute 0 03  0 00 - 0 20 Thousand/uL Final    Lymphocytes Absolute 3 49  0 60 - 4 47 Thousands/µL Final    Monocytes Absolute 0 87  0 17 - 1 22 Thousand/µL Final    Eosinophils Absolute 0 22  0 00 - 0 61 Thousand/µL Final    Basophils Absolute 0 06  0 00 - 0 10 Thousands/µL Final   COMPREHENSIVE METABOLIC PANEL - Abnormal    Sodium 136  136 - 145 mmol/L Final    Potassium 4 8  3 5 - 5 3 mmol/L Final    Comment: 18Moderately Hemolyzed;  Results May be Affected    Chloride 104  100 - 108 mmol/L Final    CO2 25  21 - 32 mmol/L Final    ANION GAP 7  4 - 13 mmol/L Final    BUN 13  5 - 25 mg/dL Final    Creatinine 0 96  0 60 - 1 30 mg/dL Final    Comment: Standardized to IDMS reference method    Glucose 117  65 - 140 mg/dL Final    Comment: If the patient is fasting, the ADA then defines impaired fasting glucose as > 100 mg/dL and diabetes as > or equal to 123 mg/dL  Specimen collection should occur prior to Sulfasalazine administration due to the potential for falsely depressed results  Specimen collection should occur prior to Sulfapyridine administration due to the potential for falsely elevated results  Calcium 8 2 (*) 8 3 - 10 1 mg/dL Final    AST 45  5 - 45 U/L Final    Comment: Specimen collection should occur prior to Sulfasalazine administration due to the potential for falsely depressed results  ALT 39  12 - 78 U/L Final    Comment: Specimen collection should occur prior to Sulfasalazine administration due to the potential for falsely depressed results  Alkaline Phosphatase 136 (*) 46 - 116 U/L Final    Total Protein 7 1  6 4 - 8 2 g/dL Final    Albumin 3 6  3 5 - 5 0 g/dL Final    Total Bilirubin 0 40  0 20 - 1 00 mg/dL Final    Comment: Use of this assay is not recommended for patients undergoing treatment with eltrombopag due to the potential for falsely elevated results  eGFR 83  ml/min/1 73sq m Final    Narrative:     [place] guidelines for Chronic Kidney Disease (CKD):     Stage 1 with normal or high GFR (GFR > 90 mL/min/1 73 square meters)    Stage 2 Mild CKD (GFR = 60-89 mL/min/1 73 square meters)    Stage 3A Moderate CKD (GFR = 45-59 mL/min/1 73 square meters)    Stage 3B Moderate CKD (GFR = 30-44 mL/min/1 73 square meters)    Stage 4 Severe CKD (GFR = 15-29 mL/min/1 73 square meters)    Stage 5 End Stage CKD (GFR <15 mL/min/1 73 square meters)  Note: GFR calculation is accurate only with a steady state creatinine   LIPASE - Abnormal    Lipase 67 (*) 73 - 393 u/L Final   LACTIC ACID, PLASMA - Normal    LACTIC ACID 1 2  0 5 - 2 0 mmol/L Final    Narrative:     Result may be elevated if tourniquet was used during collection     UA W REFLEX TO MICROSCOPIC WITH REFLEX TO CULTURE    Color, UA Yellow   Final    Clarity, UA Clear   Final    Specific Gravity, UA 1 025  1 003 - 1 030 Final    pH, UA 6 0  4 5, 5 0, 5 5, 6 0, 6 5, 7 0, 7 5, 8 0 Final    Leukocytes, UA Negative  Negative Final    Nitrite, UA Negative  Negative Final    Protein, UA Negative  Negative mg/dl Final    Glucose, UA Negative  Negative mg/dl Final    Ketones, UA Negative  Negative mg/dl Final    Urobilinogen, UA 1 0  0 2, 1 0 E U /dl E U /dl Final    Bilirubin, UA Negative  Negative Final    Blood, UA Negative  Negative Final        ED Course / Workup Pending (followup):                                    ED Course as of 03/10/22 0009   Wed Mar 09, 2022   2201 LLQ pain, diarrhea, multiple abd surgeries in past  Labs, CT pend  Thu Mar 10, 2022   0008 CT noted  Will d/c home, supportive care, f/u wit PCP and RTED if sx worsen        Procedures  MDM  Number of Diagnoses or Management Options  Abdominal pain: new and requires workup  Epiploic appendagitis: new and requires workup  Fatty liver: new and requires workup     Amount and/or Complexity of Data Reviewed  Clinical lab tests: reviewed  Tests in the radiology section of CPT®: reviewed  Tests in the medicine section of CPT®: reviewed  Discussion of test results with the performing providers: yes  Review and summarize past medical records: yes    Risk of Complications, Morbidity, and/or Mortality  Presenting problems: moderate  Diagnostic procedures: low  Management options: low    Patient Progress  Patient progress: improved          Disposition  Final diagnoses:   Abdominal pain - left lower quadrant   Fatty liver   Epiploic appendagitis     Time reflects when diagnosis was documented in both MDM as applicable and the Disposition within this note     Time User Action Codes Description Comment    3/9/2022  9:54 PM Kristyn Moreau Add [R10 9] Abdominal pain     3/9/2022  9:55 PM Kristyn Moreau Modify [R10 9] Abdominal pain left lower quadrant    3/10/2022 12:04 AM Tien Sick S Add [K76 0] Fatty liver     3/10/2022 12:05 AM Adonis Ding Add [K63 89] Epiploic appendagitis       ED Disposition     ED Disposition Condition Date/Time Comment    Discharge Stable Thu Mar 10, 2022 12:07 AM Ashish Alvarado discharge to home/self care  Follow-up Information     Follow up With Specialties Details Why Contact Info Additional Information     [address] Emergency Department Emergency Medicine  If symptoms worsen [address] [place], [phone]  [address] [facility] Emergency Department, [address] [place] [address], [facility] [address]        Patient's Medications   Discharge Prescriptions    ONDANSETRON (ZOFRAN ODT) 4 MG DISINTEGRATING TABLET    Take 1 tablet (4 mg total) by mouth every 6 (six) hours as needed for nausea or vomiting       Start Date: 3/10/2022 End Date: --       Order Dose: 4 mg       Quantity: 20 tablet    Refills: 0     No discharge procedures on file         ED Provider  Electronically Signed by     Mich Al MD  03/10/22 1297

## 2023-08-15 NOTE — ED NOTES
Called Livia at [facility], informed her that patient is up for discharge  She will  patient in about 20 minutes       Clarence Onofre RN  03/20/21 3667
Patient reports equal sharp pain located bilateral shoulders to mid upper arm   States "45 seconds of intense sharp burning "      Mekhi Soto RN  03/19/21 3934
[facility]  [phone]  Call when admit/discharge decision is made     Chet Mistry RN  03/19/21 1814
No

## 2024-01-28 ENCOUNTER — HOSPITAL ENCOUNTER (EMERGENCY)
Dept: HOSPITAL 99 - EMR | Age: 69
Discharge: HOME | End: 2024-01-28
Payer: COMMERCIAL

## 2024-01-28 VITALS — DIASTOLIC BLOOD PRESSURE: 82 MMHG | RESPIRATION RATE: 16 BRPM | SYSTOLIC BLOOD PRESSURE: 131 MMHG

## 2024-01-28 DIAGNOSIS — Z90.49: ICD-10-CM

## 2024-01-28 DIAGNOSIS — I25.10: ICD-10-CM

## 2024-01-28 DIAGNOSIS — E78.00: ICD-10-CM

## 2024-01-28 DIAGNOSIS — F41.9: ICD-10-CM

## 2024-01-28 DIAGNOSIS — G47.30: ICD-10-CM

## 2024-01-28 DIAGNOSIS — Z87.820: ICD-10-CM

## 2024-01-28 DIAGNOSIS — G25.81: ICD-10-CM

## 2024-01-28 DIAGNOSIS — F17.200: ICD-10-CM

## 2024-01-28 DIAGNOSIS — F32.A: ICD-10-CM

## 2024-01-28 DIAGNOSIS — Z87.442: ICD-10-CM

## 2024-01-28 DIAGNOSIS — I44.1: ICD-10-CM

## 2024-01-28 DIAGNOSIS — Z95.0: ICD-10-CM

## 2024-01-28 DIAGNOSIS — R10.9: Primary | ICD-10-CM

## 2024-01-28 DIAGNOSIS — E11.9: ICD-10-CM

## 2024-01-28 LAB
ALBUMIN SERPL-MCNC: 3.9 G/DL (ref 3.5–5)
ALP SERPL-CCNC: 132 U/L (ref 38–126)
ALT SERPL-CCNC: 24 U/L (ref 0–50)
AST SERPL-CCNC: 24 U/L (ref 17–59)
BUN SERPL-MCNC: 20 MG/DL (ref 9–20)
CALCIUM SERPL-MCNC: 9.4 MG/DL (ref 8.4–10.2)
CHLORIDE SERPL-SCNC: 106 MMOL/L (ref 98–107)
CO2 SERPL-SCNC: 23 MMOL/L (ref 22–30)
EGFR: > 60
ERYTHROCYTE [DISTWIDTH] IN BLOOD BY AUTOMATED COUNT: 12.6 % (ref 11.5–14.5)
GLUCOSE SERPL-MCNC: 156 MG/DL (ref 70–99)
HCT VFR BLD AUTO: 40.3 % (ref 39–52)
HGB BLD-MCNC: 14.7 G/DL (ref 13–18)
MCHC RBC AUTO-ENTMCNC: 36.5 G/DL (ref 33–37)
MCV RBC AUTO: 91.2 FL (ref 80–94)
NRBC BLD AUTO-RTO: 0 %
PLATELET # BLD AUTO: 212 10^3/UL (ref 130–400)
POTASSIUM SERPL-SCNC: 4.4 MMOL/L (ref 3.5–5.1)
PROT SERPL-MCNC: 6.5 G/DL (ref 6.3–8.2)
SODIUM SERPL-SCNC: 139 MMOL/L (ref 135–145)

## 2024-01-28 PROCEDURE — 96374 THER/PROPH/DIAG INJ IV PUSH: CPT

## 2024-01-28 PROCEDURE — 99284 EMERGENCY DEPT VISIT MOD MDM: CPT

## 2024-01-28 RX ADMIN — MORPHINE SULFATE 4 MG: 4 INJECTION INTRAVENOUS at 16:03

## 2024-01-28 RX ADMIN — MORPHINE SULFATE: 4 INJECTION INTRAVENOUS at 15:57

## 2024-02-20 ENCOUNTER — HOSPITAL ENCOUNTER (EMERGENCY)
Dept: HOSPITAL 99 - EMR | Age: 69
LOS: 1 days | Discharge: HOME | End: 2024-02-21
Payer: COMMERCIAL

## 2024-02-20 VITALS — SYSTOLIC BLOOD PRESSURE: 137 MMHG | RESPIRATION RATE: 18 BRPM | DIASTOLIC BLOOD PRESSURE: 92 MMHG

## 2024-02-20 DIAGNOSIS — E11.9: ICD-10-CM

## 2024-02-20 DIAGNOSIS — J20.9: Primary | ICD-10-CM

## 2024-02-20 DIAGNOSIS — F41.9: ICD-10-CM

## 2024-02-20 DIAGNOSIS — G25.81: ICD-10-CM

## 2024-02-20 DIAGNOSIS — F17.200: ICD-10-CM

## 2024-02-20 DIAGNOSIS — G43.909: ICD-10-CM

## 2024-02-20 DIAGNOSIS — Z95.0: ICD-10-CM

## 2024-02-20 DIAGNOSIS — E04.1: ICD-10-CM

## 2024-02-20 DIAGNOSIS — E78.00: ICD-10-CM

## 2024-02-20 DIAGNOSIS — Z87.442: ICD-10-CM

## 2024-02-20 DIAGNOSIS — Z98.0: ICD-10-CM

## 2024-02-20 DIAGNOSIS — Z87.820: ICD-10-CM

## 2024-02-20 DIAGNOSIS — M43.22: ICD-10-CM

## 2024-02-20 DIAGNOSIS — F32.A: ICD-10-CM

## 2024-02-20 DIAGNOSIS — I45.10: ICD-10-CM

## 2024-02-20 DIAGNOSIS — K57.90: ICD-10-CM

## 2024-02-20 DIAGNOSIS — I25.10: ICD-10-CM

## 2024-02-20 DIAGNOSIS — I44.1: ICD-10-CM

## 2024-02-20 DIAGNOSIS — Z90.49: ICD-10-CM

## 2024-02-20 DIAGNOSIS — F43.10: ICD-10-CM

## 2024-02-20 DIAGNOSIS — G47.30: ICD-10-CM

## 2024-02-20 LAB
ALBUMIN SERPL-MCNC: 4.3 G/DL (ref 3.5–5)
ALP SERPL-CCNC: 151 U/L (ref 38–126)
ALT SERPL-CCNC: 26 U/L (ref 0–50)
AST SERPL-CCNC: 23 U/L (ref 17–59)
BUN SERPL-MCNC: 16 MG/DL (ref 9–20)
CALCIUM SERPL-MCNC: 9.9 MG/DL (ref 8.4–10.2)
CHLORIDE SERPL-SCNC: 101 MMOL/L (ref 98–107)
CO2 SERPL-SCNC: 25 MMOL/L (ref 22–30)
EGFR: > 60
ERYTHROCYTE [DISTWIDTH] IN BLOOD BY AUTOMATED COUNT: 12.1 % (ref 11.5–14.5)
GLUCOSE SERPL-MCNC: 230 MG/DL (ref 70–99)
HCT VFR BLD AUTO: 43.9 % (ref 39–52)
HGB BLD-MCNC: 15.9 G/DL (ref 13–18)
MCHC RBC AUTO-ENTMCNC: 36.2 G/DL (ref 33–37)
MCV RBC AUTO: 88.9 FL (ref 80–94)
NRBC BLD AUTO-RTO: 0 %
PLATELET # BLD AUTO: 207 10^3/UL (ref 130–400)
POTASSIUM SERPL-SCNC: 4.3 MMOL/L (ref 3.5–5.1)
PROT SERPL-MCNC: 7.1 G/DL (ref 6.3–8.2)
SODIUM SERPL-SCNC: 136 MMOL/L (ref 135–145)
TROPONIN I SERPL-MCNC: 0.02 NG/ML

## 2024-02-20 PROCEDURE — 99285 EMERGENCY DEPT VISIT HI MDM: CPT

## 2024-02-20 RX ADMIN — IPRATROPIUM BROMIDE AND ALBUTEROL SULFATE 3 ML: 2.5; .5 SOLUTION RESPIRATORY (INHALATION) at 19:43

## 2024-02-20 RX ADMIN — DEXAMETHASONE SODIUM PHOSPHATE 10 MG: 4 INJECTION, SOLUTION INTRA-ARTICULAR; INTRALESIONAL; INTRAMUSCULAR; INTRAVENOUS; SOFT TISSUE at 19:42

## 2024-02-20 RX ADMIN — MORPHINE SULFATE 4 MG: 4 INJECTION INTRAVENOUS at 20:52

## 2024-02-21 VITALS — RESPIRATION RATE: 18 BRPM | SYSTOLIC BLOOD PRESSURE: 132 MMHG | DIASTOLIC BLOOD PRESSURE: 89 MMHG

## 2024-02-26 ENCOUNTER — HOSPITAL ENCOUNTER (EMERGENCY)
Dept: HOSPITAL 99 - EMR | Age: 69
Discharge: HOME | End: 2024-02-26
Payer: COMMERCIAL

## 2024-02-26 VITALS — RESPIRATION RATE: 16 BRPM | DIASTOLIC BLOOD PRESSURE: 75 MMHG | SYSTOLIC BLOOD PRESSURE: 121 MMHG

## 2024-02-26 VITALS — BODY MASS INDEX: 30.6 KG/M2

## 2024-02-26 VITALS — RESPIRATION RATE: 18 BRPM | SYSTOLIC BLOOD PRESSURE: 175 MMHG | DIASTOLIC BLOOD PRESSURE: 89 MMHG

## 2024-02-26 DIAGNOSIS — F41.9: ICD-10-CM

## 2024-02-26 DIAGNOSIS — G25.81: ICD-10-CM

## 2024-02-26 DIAGNOSIS — I44.1: ICD-10-CM

## 2024-02-26 DIAGNOSIS — F32.A: ICD-10-CM

## 2024-02-26 DIAGNOSIS — Z87.820: ICD-10-CM

## 2024-02-26 DIAGNOSIS — Z90.49: ICD-10-CM

## 2024-02-26 DIAGNOSIS — I25.10: ICD-10-CM

## 2024-02-26 DIAGNOSIS — Z95.0: ICD-10-CM

## 2024-02-26 DIAGNOSIS — G47.30: ICD-10-CM

## 2024-02-26 DIAGNOSIS — E11.9: ICD-10-CM

## 2024-02-26 DIAGNOSIS — Z87.442: ICD-10-CM

## 2024-02-26 DIAGNOSIS — F17.200: ICD-10-CM

## 2024-02-26 DIAGNOSIS — E78.00: ICD-10-CM

## 2024-02-26 DIAGNOSIS — M54.2: Primary | ICD-10-CM

## 2024-02-26 PROCEDURE — 96374 THER/PROPH/DIAG INJ IV PUSH: CPT

## 2024-02-26 PROCEDURE — 99283 EMERGENCY DEPT VISIT LOW MDM: CPT

## 2024-02-26 PROCEDURE — 96375 TX/PRO/DX INJ NEW DRUG ADDON: CPT

## 2024-02-26 RX ADMIN — DEXAMETHASONE SODIUM PHOSPHATE 10 MG: 4 INJECTION, SOLUTION INTRA-ARTICULAR; INTRALESIONAL; INTRAMUSCULAR; INTRAVENOUS; SOFT TISSUE at 16:06

## 2024-02-26 RX ADMIN — ONDANSETRON HYDROCHLORIDE 4 MG: 2 SOLUTION INTRAMUSCULAR; INTRAVENOUS at 16:07

## 2024-02-26 RX ADMIN — HYDROMORPHONE HYDROCHLORIDE 0.5 MG: 1 INJECTION, SOLUTION INTRAMUSCULAR; INTRAVENOUS; SUBCUTANEOUS at 16:06

## 2024-04-05 ENCOUNTER — HOSPITAL ENCOUNTER (OUTPATIENT)
Dept: HOSPITAL 99 - MRI | Age: 69
End: 2024-04-05
Payer: COMMERCIAL

## 2024-04-05 DIAGNOSIS — M54.2: Primary | ICD-10-CM

## 2024-05-05 ENCOUNTER — HOSPITAL ENCOUNTER (EMERGENCY)
Dept: HOSPITAL 99 - EMR | Age: 69
LOS: 1 days | Discharge: HOME | End: 2024-05-06
Payer: COMMERCIAL

## 2024-05-05 VITALS — DIASTOLIC BLOOD PRESSURE: 78 MMHG | RESPIRATION RATE: 17 BRPM | SYSTOLIC BLOOD PRESSURE: 119 MMHG

## 2024-05-05 VITALS — RESPIRATION RATE: 10 BRPM

## 2024-05-05 VITALS — DIASTOLIC BLOOD PRESSURE: 93 MMHG | SYSTOLIC BLOOD PRESSURE: 133 MMHG | RESPIRATION RATE: 17 BRPM

## 2024-05-05 VITALS — SYSTOLIC BLOOD PRESSURE: 135 MMHG | RESPIRATION RATE: 17 BRPM | DIASTOLIC BLOOD PRESSURE: 80 MMHG

## 2024-05-05 VITALS — RESPIRATION RATE: 23 BRPM

## 2024-05-05 VITALS — RESPIRATION RATE: 19 BRPM | DIASTOLIC BLOOD PRESSURE: 88 MMHG | SYSTOLIC BLOOD PRESSURE: 137 MMHG

## 2024-05-05 VITALS — SYSTOLIC BLOOD PRESSURE: 118 MMHG | DIASTOLIC BLOOD PRESSURE: 85 MMHG | RESPIRATION RATE: 18 BRPM

## 2024-05-05 VITALS — RESPIRATION RATE: 18 BRPM | DIASTOLIC BLOOD PRESSURE: 96 MMHG | SYSTOLIC BLOOD PRESSURE: 122 MMHG

## 2024-05-05 VITALS — RESPIRATION RATE: 20 BRPM

## 2024-05-05 VITALS — RESPIRATION RATE: 18 BRPM

## 2024-05-05 VITALS — RESPIRATION RATE: 14 BRPM

## 2024-05-05 VITALS — SYSTOLIC BLOOD PRESSURE: 136 MMHG | DIASTOLIC BLOOD PRESSURE: 95 MMHG | RESPIRATION RATE: 16 BRPM

## 2024-05-05 VITALS — RESPIRATION RATE: 22 BRPM

## 2024-05-05 VITALS — RESPIRATION RATE: 19 BRPM

## 2024-05-05 DIAGNOSIS — G43.909: ICD-10-CM

## 2024-05-05 DIAGNOSIS — Z90.49: ICD-10-CM

## 2024-05-05 DIAGNOSIS — Z95.0: ICD-10-CM

## 2024-05-05 DIAGNOSIS — Z88.8: ICD-10-CM

## 2024-05-05 DIAGNOSIS — F32.A: ICD-10-CM

## 2024-05-05 DIAGNOSIS — R07.89: Primary | ICD-10-CM

## 2024-05-05 DIAGNOSIS — R03.0: ICD-10-CM

## 2024-05-05 DIAGNOSIS — F17.200: ICD-10-CM

## 2024-05-05 DIAGNOSIS — E11.9: ICD-10-CM

## 2024-05-05 DIAGNOSIS — I45.10: ICD-10-CM

## 2024-05-05 DIAGNOSIS — K57.92: ICD-10-CM

## 2024-05-05 DIAGNOSIS — I25.10: ICD-10-CM

## 2024-05-05 DIAGNOSIS — I44.1: ICD-10-CM

## 2024-05-05 DIAGNOSIS — R06.02: ICD-10-CM

## 2024-05-05 DIAGNOSIS — Z98.0: ICD-10-CM

## 2024-05-05 DIAGNOSIS — M43.22: ICD-10-CM

## 2024-05-05 DIAGNOSIS — Z87.442: ICD-10-CM

## 2024-05-05 DIAGNOSIS — Z87.820: ICD-10-CM

## 2024-05-05 DIAGNOSIS — F41.9: ICD-10-CM

## 2024-05-05 DIAGNOSIS — G25.81: ICD-10-CM

## 2024-05-05 DIAGNOSIS — G47.30: ICD-10-CM

## 2024-05-05 DIAGNOSIS — F43.10: ICD-10-CM

## 2024-05-05 DIAGNOSIS — E78.00: ICD-10-CM

## 2024-05-05 LAB
ALBUMIN SERPL-MCNC: 4.3 G/DL (ref 3.5–5)
ALP SERPL-CCNC: 152 U/L (ref 38–126)
ALT SERPL-CCNC: 22 U/L (ref 0–50)
AST SERPL-CCNC: 20 U/L (ref 17–59)
BUN SERPL-MCNC: 15 MG/DL (ref 9–20)
CALCIUM SERPL-MCNC: 9.6 MG/DL (ref 8.4–10.2)
CHLORIDE SERPL-SCNC: 106 MMOL/L (ref 98–107)
CO2 SERPL-SCNC: 25 MMOL/L (ref 22–30)
EGFR: > 60
ERYTHROCYTE [DISTWIDTH] IN BLOOD BY AUTOMATED COUNT: 12.5 % (ref 11.5–14.5)
GLUCOSE SERPL-MCNC: 241 MG/DL (ref 70–99)
HCT VFR BLD AUTO: 39.7 % (ref 39–52)
HGB BLD-MCNC: 14.4 G/DL (ref 13–18)
INR PPP: 1.04
LIPASE SERPL-CCNC: 118 U/L (ref 23–300)
MCHC RBC AUTO-ENTMCNC: 36.3 G/DL (ref 33–37)
MCV RBC AUTO: 88.6 FL (ref 80–94)
NRBC BLD AUTO-RTO: 0 %
PLATELET # BLD AUTO: 224 10^3/UL (ref 130–400)
POTASSIUM SERPL-SCNC: 4 MMOL/L (ref 3.5–5.1)
PROT SERPL-MCNC: 7.1 G/DL (ref 6.3–8.2)
PROTHROMBIN TIME: 13.4 SEC (ref 11.4–14.6)
SODIUM SERPL-SCNC: 136 MMOL/L (ref 135–145)
TROPONIN I SERPL-MCNC: < 0.012 NG/ML
TROPONIN I SERPL-MCNC: < 0.012 NG/ML

## 2024-05-05 PROCEDURE — 99285 EMERGENCY DEPT VISIT HI MDM: CPT

## 2024-05-05 PROCEDURE — 96374 THER/PROPH/DIAG INJ IV PUSH: CPT

## 2024-05-05 RX ADMIN — HYDROMORPHONE HYDROCHLORIDE 0.5 MG: 1 INJECTION, SOLUTION INTRAMUSCULAR; INTRAVENOUS; SUBCUTANEOUS at 22:31

## 2024-05-06 VITALS — DIASTOLIC BLOOD PRESSURE: 94 MMHG | SYSTOLIC BLOOD PRESSURE: 127 MMHG | RESPIRATION RATE: 12 BRPM

## 2024-05-06 VITALS — RESPIRATION RATE: 11 BRPM

## 2024-05-06 VITALS — RESPIRATION RATE: 14 BRPM

## 2024-05-06 VITALS — SYSTOLIC BLOOD PRESSURE: 134 MMHG | DIASTOLIC BLOOD PRESSURE: 85 MMHG | RESPIRATION RATE: 18 BRPM

## 2024-05-06 VITALS — DIASTOLIC BLOOD PRESSURE: 91 MMHG | SYSTOLIC BLOOD PRESSURE: 121 MMHG

## 2024-05-06 VITALS — DIASTOLIC BLOOD PRESSURE: 86 MMHG | SYSTOLIC BLOOD PRESSURE: 122 MMHG | RESPIRATION RATE: 11 BRPM

## 2024-05-06 VITALS — SYSTOLIC BLOOD PRESSURE: 131 MMHG | DIASTOLIC BLOOD PRESSURE: 89 MMHG | RESPIRATION RATE: 19 BRPM

## 2024-05-06 VITALS — RESPIRATION RATE: 15 BRPM

## 2024-05-06 VITALS — RESPIRATION RATE: 16 BRPM

## 2024-05-06 VITALS — RESPIRATION RATE: 18 BRPM

## 2024-07-17 ENCOUNTER — HOSPITAL ENCOUNTER (EMERGENCY)
Dept: HOSPITAL 99 - EMR | Age: 69
Discharge: HOME | End: 2024-07-17
Payer: COMMERCIAL

## 2024-07-17 VITALS — RESPIRATION RATE: 18 BRPM

## 2024-07-17 VITALS — RESPIRATION RATE: 14 BRPM | SYSTOLIC BLOOD PRESSURE: 111 MMHG | DIASTOLIC BLOOD PRESSURE: 84 MMHG

## 2024-07-17 VITALS — RESPIRATION RATE: 19 BRPM

## 2024-07-17 VITALS — SYSTOLIC BLOOD PRESSURE: 157 MMHG | DIASTOLIC BLOOD PRESSURE: 87 MMHG | RESPIRATION RATE: 18 BRPM

## 2024-07-17 VITALS — RESPIRATION RATE: 10 BRPM

## 2024-07-17 VITALS — BODY MASS INDEX: 28.2 KG/M2

## 2024-07-17 VITALS — RESPIRATION RATE: 17 BRPM

## 2024-07-17 VITALS — RESPIRATION RATE: 13 BRPM | DIASTOLIC BLOOD PRESSURE: 147 MMHG | SYSTOLIC BLOOD PRESSURE: 164 MMHG

## 2024-07-17 VITALS — RESPIRATION RATE: 20 BRPM

## 2024-07-17 VITALS — DIASTOLIC BLOOD PRESSURE: 84 MMHG | SYSTOLIC BLOOD PRESSURE: 111 MMHG

## 2024-07-17 DIAGNOSIS — E11.9: ICD-10-CM

## 2024-07-17 DIAGNOSIS — E78.00: ICD-10-CM

## 2024-07-17 DIAGNOSIS — G25.81: ICD-10-CM

## 2024-07-17 DIAGNOSIS — M43.22: ICD-10-CM

## 2024-07-17 DIAGNOSIS — G47.30: ICD-10-CM

## 2024-07-17 DIAGNOSIS — K57.90: ICD-10-CM

## 2024-07-17 DIAGNOSIS — I25.10: ICD-10-CM

## 2024-07-17 DIAGNOSIS — F17.200: ICD-10-CM

## 2024-07-17 DIAGNOSIS — G43.909: ICD-10-CM

## 2024-07-17 DIAGNOSIS — I44.1: ICD-10-CM

## 2024-07-17 DIAGNOSIS — F41.9: ICD-10-CM

## 2024-07-17 DIAGNOSIS — Z88.8: ICD-10-CM

## 2024-07-17 DIAGNOSIS — Z87.442: ICD-10-CM

## 2024-07-17 DIAGNOSIS — Z87.820: ICD-10-CM

## 2024-07-17 DIAGNOSIS — F32.A: ICD-10-CM

## 2024-07-17 DIAGNOSIS — Z98.0: ICD-10-CM

## 2024-07-17 DIAGNOSIS — R07.89: Primary | ICD-10-CM

## 2024-07-17 DIAGNOSIS — Z95.0: ICD-10-CM

## 2024-07-17 DIAGNOSIS — F43.10: ICD-10-CM

## 2024-07-17 LAB
ALBUMIN SERPL-MCNC: 4.4 G/DL (ref 3.5–5)
ALP SERPL-CCNC: 95 U/L (ref 38–126)
ALT SERPL-CCNC: 24 U/L (ref 0–50)
AST SERPL-CCNC: 23 U/L (ref 17–59)
BUN SERPL-MCNC: 16 MG/DL (ref 9–20)
CALCIUM SERPL-MCNC: 9.6 MG/DL (ref 8.4–10.2)
CHLORIDE SERPL-SCNC: 107 MMOL/L (ref 98–107)
CO2 SERPL-SCNC: 22 MMOL/L (ref 22–30)
EGFR: > 60
ERYTHROCYTE [DISTWIDTH] IN BLOOD BY AUTOMATED COUNT: 12.3 % (ref 11.5–14.5)
GLUCOSE SERPL-MCNC: 122 MG/DL (ref 70–99)
HCT VFR BLD AUTO: 42.1 % (ref 39–52)
HGB BLD-MCNC: 15.5 G/DL (ref 13–18)
MCHC RBC AUTO-ENTMCNC: 36.8 G/DL (ref 33–37)
MCV RBC AUTO: 90.9 FL (ref 80–94)
NRBC BLD AUTO-RTO: 0 %
PLATELET # BLD AUTO: 181 10^3/UL (ref 130–400)
POTASSIUM SERPL-SCNC: 4.1 MMOL/L (ref 3.5–5.1)
PROT SERPL-MCNC: 6.8 G/DL (ref 6.3–8.2)
SODIUM SERPL-SCNC: 138 MMOL/L (ref 135–145)
TROPONIN I SERPL-MCNC: < 0.012 NG/ML

## 2024-07-17 PROCEDURE — 93288 INTERROG EVL PM/LDLS PM IP: CPT

## 2024-07-17 PROCEDURE — 99285 EMERGENCY DEPT VISIT HI MDM: CPT

## 2024-07-17 PROCEDURE — 96374 THER/PROPH/DIAG INJ IV PUSH: CPT

## 2024-07-17 PROCEDURE — 96375 TX/PRO/DX INJ NEW DRUG ADDON: CPT

## 2024-07-17 RX ADMIN — MORPHINE SULFATE 4 MG: 4 INJECTION INTRAVENOUS at 16:18

## 2024-07-17 RX ADMIN — KETOROLAC TROMETHAMINE 15 MG: 15 INJECTION, SOLUTION INTRAMUSCULAR; INTRAVENOUS at 15:00

## 2024-09-11 ENCOUNTER — HOSPITAL ENCOUNTER (EMERGENCY)
Dept: HOSPITAL 99 - EMR | Age: 69
Discharge: HOME | End: 2024-09-11
Payer: OTHER GOVERNMENT

## 2024-09-11 VITALS — DIASTOLIC BLOOD PRESSURE: 75 MMHG | SYSTOLIC BLOOD PRESSURE: 109 MMHG

## 2024-09-11 VITALS — SYSTOLIC BLOOD PRESSURE: 149 MMHG | DIASTOLIC BLOOD PRESSURE: 90 MMHG

## 2024-09-11 VITALS — BODY MASS INDEX: 27.9 KG/M2

## 2024-09-11 VITALS — SYSTOLIC BLOOD PRESSURE: 155 MMHG | DIASTOLIC BLOOD PRESSURE: 94 MMHG | RESPIRATION RATE: 16 BRPM

## 2024-09-11 DIAGNOSIS — Z87.820: ICD-10-CM

## 2024-09-11 DIAGNOSIS — Z90.49: ICD-10-CM

## 2024-09-11 DIAGNOSIS — G25.81: ICD-10-CM

## 2024-09-11 DIAGNOSIS — E11.9: ICD-10-CM

## 2024-09-11 DIAGNOSIS — Z95.0: ICD-10-CM

## 2024-09-11 DIAGNOSIS — F41.8: ICD-10-CM

## 2024-09-11 DIAGNOSIS — I25.10: ICD-10-CM

## 2024-09-11 DIAGNOSIS — G47.30: ICD-10-CM

## 2024-09-11 DIAGNOSIS — F17.200: ICD-10-CM

## 2024-09-11 DIAGNOSIS — E78.00: ICD-10-CM

## 2024-09-11 DIAGNOSIS — Z87.442: ICD-10-CM

## 2024-09-11 DIAGNOSIS — R10.9: Primary | ICD-10-CM

## 2024-09-11 DIAGNOSIS — I44.1: ICD-10-CM

## 2024-09-11 LAB
ALBUMIN SERPL-MCNC: 4.3 G/DL (ref 3.5–5)
ALP SERPL-CCNC: 106 U/L (ref 38–126)
ALT SERPL-CCNC: 29 U/L (ref 0–50)
AST SERPL-CCNC: 21 U/L (ref 17–59)
BUN SERPL-MCNC: 11 MG/DL (ref 9–20)
CALCIUM SERPL-MCNC: 9.6 MG/DL (ref 8.4–10.2)
CHLORIDE SERPL-SCNC: 106 MMOL/L (ref 98–107)
CO2 SERPL-SCNC: 24 MMOL/L (ref 22–30)
EGFR: > 60
ERYTHROCYTE [DISTWIDTH] IN BLOOD BY AUTOMATED COUNT: 12.1 % (ref 11.5–14.5)
ESTIMATED CREATININE CLEARANCE: 98 ML/MIN
GLUCOSE SERPL-MCNC: 188 MG/DL (ref 70–99)
HCT VFR BLD AUTO: 40.7 % (ref 39–52)
HGB BLD-MCNC: 14.7 G/DL (ref 13–18)
MCHC RBC AUTO-ENTMCNC: 36.1 G/DL (ref 33–37)
MCV RBC AUTO: 88.9 FL (ref 80–94)
NRBC BLD AUTO-RTO: 0 %
PLATELET # BLD AUTO: 217 10^3/UL (ref 130–400)
POTASSIUM SERPL-SCNC: 4.7 MMOL/L (ref 3.5–5.1)
PROT SERPL-MCNC: 6.7 G/DL (ref 6.3–8.2)
SODIUM SERPL-SCNC: 140 MMOL/L (ref 135–145)

## 2024-09-11 PROCEDURE — 99284 EMERGENCY DEPT VISIT MOD MDM: CPT

## 2024-09-11 PROCEDURE — 96376 TX/PRO/DX INJ SAME DRUG ADON: CPT

## 2024-09-11 PROCEDURE — 96374 THER/PROPH/DIAG INJ IV PUSH: CPT

## 2024-09-11 PROCEDURE — 96361 HYDRATE IV INFUSION ADD-ON: CPT

## 2024-09-11 PROCEDURE — 96375 TX/PRO/DX INJ NEW DRUG ADDON: CPT

## 2024-09-11 RX ADMIN — AMOXICILLIN AND CLAVULANATE POTASSIUM 1 TABLET: 875; 125 TABLET, FILM COATED ORAL at 18:30

## 2024-09-11 RX ADMIN — ONDANSETRON HYDROCHLORIDE 4 MG: 2 SOLUTION INTRAMUSCULAR; INTRAVENOUS at 15:28

## 2024-09-11 RX ADMIN — HYDROMORPHONE HYDROCHLORIDE 1 MG: 1 INJECTION, SOLUTION INTRAMUSCULAR; INTRAVENOUS; SUBCUTANEOUS at 15:30

## 2024-09-11 RX ADMIN — SODIUM CHLORIDE 1000: 900 INJECTION, SOLUTION INTRAVENOUS at 15:28

## 2024-09-11 RX ADMIN — HYDROMORPHONE HYDROCHLORIDE 1 MG: 1 INJECTION, SOLUTION INTRAMUSCULAR; INTRAVENOUS; SUBCUTANEOUS at 16:12

## 2024-09-11 RX ADMIN — HYDROMORPHONE HYDROCHLORIDE 0.5 MG: 1 INJECTION, SOLUTION INTRAMUSCULAR; INTRAVENOUS; SUBCUTANEOUS at 18:31

## 2024-12-22 ENCOUNTER — HOSPITAL ENCOUNTER (OUTPATIENT)
Dept: HOSPITAL 99 - 2 SOUTH | Age: 69
Setting detail: OBSERVATION
LOS: 1 days | Discharge: HOME | End: 2024-12-23
Payer: COMMERCIAL

## 2024-12-22 VITALS — RESPIRATION RATE: 17 BRPM | SYSTOLIC BLOOD PRESSURE: 125 MMHG | DIASTOLIC BLOOD PRESSURE: 87 MMHG

## 2024-12-22 VITALS — DIASTOLIC BLOOD PRESSURE: 91 MMHG | RESPIRATION RATE: 18 BRPM | SYSTOLIC BLOOD PRESSURE: 123 MMHG

## 2024-12-22 VITALS — RESPIRATION RATE: 20 BRPM | SYSTOLIC BLOOD PRESSURE: 131 MMHG | DIASTOLIC BLOOD PRESSURE: 74 MMHG

## 2024-12-22 VITALS — RESPIRATION RATE: 20 BRPM | DIASTOLIC BLOOD PRESSURE: 77 MMHG | SYSTOLIC BLOOD PRESSURE: 135 MMHG

## 2024-12-22 VITALS — RESPIRATION RATE: 21 BRPM

## 2024-12-22 VITALS — RESPIRATION RATE: 13 BRPM | SYSTOLIC BLOOD PRESSURE: 109 MMHG | DIASTOLIC BLOOD PRESSURE: 80 MMHG

## 2024-12-22 VITALS — RESPIRATION RATE: 12 BRPM

## 2024-12-22 VITALS — RESPIRATION RATE: 17 BRPM

## 2024-12-22 VITALS — RESPIRATION RATE: 16 BRPM | SYSTOLIC BLOOD PRESSURE: 160 MMHG | DIASTOLIC BLOOD PRESSURE: 92 MMHG

## 2024-12-22 VITALS — DIASTOLIC BLOOD PRESSURE: 78 MMHG | RESPIRATION RATE: 16 BRPM | SYSTOLIC BLOOD PRESSURE: 131 MMHG

## 2024-12-22 VITALS — DIASTOLIC BLOOD PRESSURE: 76 MMHG | SYSTOLIC BLOOD PRESSURE: 118 MMHG | RESPIRATION RATE: 13 BRPM

## 2024-12-22 VITALS — SYSTOLIC BLOOD PRESSURE: 116 MMHG | DIASTOLIC BLOOD PRESSURE: 83 MMHG | RESPIRATION RATE: 18 BRPM

## 2024-12-22 VITALS — SYSTOLIC BLOOD PRESSURE: 135 MMHG | DIASTOLIC BLOOD PRESSURE: 77 MMHG | RESPIRATION RATE: 13 BRPM

## 2024-12-22 VITALS — DIASTOLIC BLOOD PRESSURE: 72 MMHG | SYSTOLIC BLOOD PRESSURE: 109 MMHG | RESPIRATION RATE: 15 BRPM

## 2024-12-22 VITALS — DIASTOLIC BLOOD PRESSURE: 78 MMHG | RESPIRATION RATE: 17 BRPM | SYSTOLIC BLOOD PRESSURE: 102 MMHG

## 2024-12-22 VITALS — RESPIRATION RATE: 19 BRPM

## 2024-12-22 VITALS — RESPIRATION RATE: 35 BRPM

## 2024-12-22 VITALS — BODY MASS INDEX: 27.2 KG/M2 | BODY MASS INDEX: 27.4 KG/M2

## 2024-12-22 VITALS — RESPIRATION RATE: 13 BRPM

## 2024-12-22 VITALS — RESPIRATION RATE: 23 BRPM

## 2024-12-22 VITALS — RESPIRATION RATE: 18 BRPM

## 2024-12-22 VITALS — RESPIRATION RATE: 14 BRPM

## 2024-12-22 DIAGNOSIS — K21.9: ICD-10-CM

## 2024-12-22 DIAGNOSIS — Z88.8: ICD-10-CM

## 2024-12-22 DIAGNOSIS — I25.10: ICD-10-CM

## 2024-12-22 DIAGNOSIS — M79.602: ICD-10-CM

## 2024-12-22 DIAGNOSIS — Z90.49: ICD-10-CM

## 2024-12-22 DIAGNOSIS — Z87.442: ICD-10-CM

## 2024-12-22 DIAGNOSIS — Z95.0: ICD-10-CM

## 2024-12-22 DIAGNOSIS — I45.10: ICD-10-CM

## 2024-12-22 DIAGNOSIS — R42: ICD-10-CM

## 2024-12-22 DIAGNOSIS — Z82.41: ICD-10-CM

## 2024-12-22 DIAGNOSIS — M54.2: ICD-10-CM

## 2024-12-22 DIAGNOSIS — E11.9: ICD-10-CM

## 2024-12-22 DIAGNOSIS — F17.200: ICD-10-CM

## 2024-12-22 DIAGNOSIS — G47.33: ICD-10-CM

## 2024-12-22 DIAGNOSIS — Z87.820: ICD-10-CM

## 2024-12-22 DIAGNOSIS — F41.9: ICD-10-CM

## 2024-12-22 DIAGNOSIS — R68.84: ICD-10-CM

## 2024-12-22 DIAGNOSIS — Q07.00: ICD-10-CM

## 2024-12-22 DIAGNOSIS — F32.A: ICD-10-CM

## 2024-12-22 DIAGNOSIS — Z98.1: ICD-10-CM

## 2024-12-22 DIAGNOSIS — R07.89: Primary | ICD-10-CM

## 2024-12-22 DIAGNOSIS — I34.0: ICD-10-CM

## 2024-12-22 DIAGNOSIS — R19.7: ICD-10-CM

## 2024-12-22 DIAGNOSIS — E78.00: ICD-10-CM

## 2024-12-22 DIAGNOSIS — G25.81: ICD-10-CM

## 2024-12-22 DIAGNOSIS — F43.10: ICD-10-CM

## 2024-12-22 DIAGNOSIS — G89.29: ICD-10-CM

## 2024-12-22 DIAGNOSIS — Z79.4: ICD-10-CM

## 2024-12-22 LAB
ALBUMIN SERPL-MCNC: 4.8 G/DL (ref 3.5–5)
ALP SERPL-CCNC: 108 U/L (ref 38–126)
ALT SERPL-CCNC: 29 U/L (ref 0–50)
AST SERPL-CCNC: 28 U/L (ref 17–59)
BUN SERPL-MCNC: 15 MG/DL (ref 9–20)
CALCIUM SERPL-MCNC: 9.6 MG/DL (ref 8.4–10.2)
CHLORIDE SERPL-SCNC: 102 MMOL/L (ref 98–107)
CO2 SERPL-SCNC: 26 MMOL/L (ref 22–30)
EGFR: > 60
ERYTHROCYTE [DISTWIDTH] IN BLOOD BY AUTOMATED COUNT: 12.2 % (ref 11.5–14.5)
GLUCOSE SERPL-MCNC: 153 MG/DL (ref 70–99)
HCT VFR BLD AUTO: 45.1 % (ref 39–52)
HGB BLD-MCNC: 16 G/DL (ref 13–18)
INR PPP: 0.93
MCHC RBC AUTO-ENTMCNC: 35.5 G/DL (ref 33–37)
MCV RBC AUTO: 91.7 FL (ref 80–94)
NRBC BLD AUTO-RTO: 0 %
PLATELET # BLD AUTO: 197 10^3/UL (ref 130–400)
POTASSIUM SERPL-SCNC: 4.7 MMOL/L (ref 3.5–5.1)
PROT SERPL-MCNC: 7.6 G/DL (ref 6.3–8.2)
PROTHROMBIN TIME: 12.8 SEC (ref 11.4–14.6)
SODIUM SERPL-SCNC: 138 MMOL/L (ref 135–145)
TROPONIN I SERPL-MCNC: < 0.012 NG/ML

## 2024-12-22 PROCEDURE — A9500 TC99M SESTAMIBI: HCPCS

## 2024-12-22 PROCEDURE — G0378 HOSPITAL OBSERVATION PER HR: HCPCS

## 2024-12-22 RX ADMIN — NITROGLYCERIN 0.4 MG: 0.4 TABLET SUBLINGUAL at 18:31

## 2024-12-22 RX ADMIN — MORPHINE SULFATE 2 MG: 2 INJECTION, SOLUTION INTRAMUSCULAR; INTRAVENOUS at 23:42

## 2024-12-22 RX ADMIN — NITROGLYCERIN 0.4 MG: 0.4 TABLET SUBLINGUAL at 18:25

## 2024-12-22 RX ADMIN — ATORVASTATIN CALCIUM 40 MG: 40 TABLET, FILM COATED ORAL at 23:41

## 2024-12-22 RX ADMIN — NITROGLYCERIN 0.4 MG: 0.4 TABLET SUBLINGUAL at 18:20

## 2024-12-22 RX ADMIN — MORPHINE SULFATE 4 MG: 4 INJECTION INTRAVENOUS at 20:38

## 2024-12-23 VITALS — SYSTOLIC BLOOD PRESSURE: 142 MMHG | DIASTOLIC BLOOD PRESSURE: 88 MMHG | RESPIRATION RATE: 18 BRPM

## 2024-12-23 VITALS — RESPIRATION RATE: 16 BRPM | DIASTOLIC BLOOD PRESSURE: 73 MMHG | SYSTOLIC BLOOD PRESSURE: 115 MMHG

## 2024-12-23 VITALS — RESPIRATION RATE: 18 BRPM | SYSTOLIC BLOOD PRESSURE: 140 MMHG | DIASTOLIC BLOOD PRESSURE: 69 MMHG

## 2024-12-23 LAB
BUN SERPL-MCNC: 18 MG/DL (ref 9–20)
CALCIUM SERPL-MCNC: 9.1 MG/DL (ref 8.4–10.2)
CHLORIDE SERPL-SCNC: 104 MMOL/L (ref 98–107)
CO2 SERPL-SCNC: 24 MMOL/L (ref 22–30)
EGFR: > 60
ERYTHROCYTE [DISTWIDTH] IN BLOOD BY AUTOMATED COUNT: 12.1 % (ref 11.5–14.5)
ESTIMATED CREATININE CLEARANCE: 88 ML/MIN
GLUCOSE SERPL-MCNC: 111 MG/DL (ref 70–99)
HBA1C MFR BLD: 6.6 % (ref 4–5.6)
HCT VFR BLD AUTO: 42 % (ref 39–52)
HDLC SERPL-MCNC: 24 MG/DL
HGB BLD-MCNC: 15.2 G/DL (ref 13–18)
LDLC SERPL CALC-MCNC: 134 MG/DL
MCHC RBC AUTO-ENTMCNC: 36.2 G/DL (ref 33–37)
MCV RBC AUTO: 91.9 FL (ref 80–94)
PLATELET # BLD AUTO: 171 10^3/UL (ref 130–400)
POTASSIUM SERPL-SCNC: 4.4 MMOL/L (ref 3.5–5.1)
SODIUM SERPL-SCNC: 136 MMOL/L (ref 135–145)
TROPONIN I SERPL-MCNC: < 0.012 NG/ML
VLDLC SERPL CALC-MCNC: 46 MG/DL (ref 0–30)

## 2024-12-23 RX ADMIN — GABAPENTIN 800 MG: 400 CAPSULE ORAL at 09:04

## 2024-12-23 RX ADMIN — ASPIRIN 81 MG: 81 TABLET, CHEWABLE ORAL at 09:04

## 2024-12-23 RX ADMIN — PANTOPRAZOLE SODIUM 40 MG: 40 TABLET, DELAYED RELEASE ORAL at 09:03

## 2024-12-23 RX ADMIN — Medication 25 MCG: at 09:04

## 2024-12-23 RX ADMIN — MORPHINE SULFATE 2 MG: 2 INJECTION, SOLUTION INTRAMUSCULAR; INTRAVENOUS at 06:03

## 2025-02-11 ENCOUNTER — TELEPHONE (OUTPATIENT)
Dept: GASTROENTEROLOGY | Facility: CLINIC | Age: 70
End: 2025-02-11

## 2025-02-11 ENCOUNTER — OFFICE VISIT (OUTPATIENT)
Dept: GASTROENTEROLOGY | Facility: CLINIC | Age: 70
End: 2025-02-11
Payer: MEDICARE

## 2025-02-11 VITALS
DIASTOLIC BLOOD PRESSURE: 79 MMHG | BODY MASS INDEX: 27.89 KG/M2 | WEIGHT: 229 LBS | HEIGHT: 76 IN | SYSTOLIC BLOOD PRESSURE: 136 MMHG

## 2025-02-11 DIAGNOSIS — K21.9 GASTROESOPHAGEAL REFLUX DISEASE, UNSPECIFIED WHETHER ESOPHAGITIS PRESENT: ICD-10-CM

## 2025-02-11 DIAGNOSIS — K22.2 ESOPHAGEAL STENOSIS: Primary | ICD-10-CM

## 2025-02-11 DIAGNOSIS — R13.10 DYSPHAGIA, UNSPECIFIED TYPE: ICD-10-CM

## 2025-02-11 DIAGNOSIS — Z86.0100 HISTORY OF COLONIC POLYPS: ICD-10-CM

## 2025-02-11 PROCEDURE — G2211 COMPLEX E/M VISIT ADD ON: HCPCS | Performed by: INTERNAL MEDICINE

## 2025-02-11 PROCEDURE — 99204 OFFICE O/P NEW MOD 45 MIN: CPT | Performed by: INTERNAL MEDICINE

## 2025-02-11 RX ORDER — DULOXETIN HYDROCHLORIDE 20 MG/1
20 CAPSULE, DELAYED RELEASE ORAL DAILY
COMMUNITY

## 2025-02-11 RX ORDER — PANTOPRAZOLE SODIUM 40 MG/1
40 TABLET, DELAYED RELEASE ORAL DAILY
COMMUNITY

## 2025-02-11 RX ORDER — SODIUM CHLORIDE, SODIUM LACTATE, POTASSIUM CHLORIDE, CALCIUM CHLORIDE 600; 310; 30; 20 MG/100ML; MG/100ML; MG/100ML; MG/100ML
125 INJECTION, SOLUTION INTRAVENOUS CONTINUOUS
OUTPATIENT
Start: 2025-02-11

## 2025-02-11 NOTE — TELEPHONE ENCOUNTER
Scheduled date of combo (as of today):2/27/25  Physician performing combo:YONG  Location of combo:[facility]  Bowel prep reviewed with patient:Julio  Instructions reviewed with patient by:KAREN  Clearances: N

## 2025-02-11 NOTE — H&P (VIEW-ONLY)
Name: Gabe Lemus      : 1955      MRN: [id number]  Encounter Provider: Abigail Jordan MD  Encounter Date: 2025   Encounter department: [facility]    :  Assessment & Plan  Esophageal stenosis  --Reports difficulty swallowing solids, worsening over years. No nausea, vomiting, or heartburn unless large meals/acidic foods. On pantoprazole. Barium swallow study suggests obstruction. Endoscopy scheduled for 2025, with possible balloon dilation. NPO after midnight before procedure. [facility]. If endoscopy normal, consider manometry.  --Diagnostic plan: Endoscopy scheduled for 2025, with possible balloon dilation. If endoscopy normal, consider manometry.  --Treatment plan: On pantoprazole. NPO after midnight before procedure. [facility] report.  Orders:    EGD; Future    Dysphagia, unspecified type    Orders:    EGD; Future    Gastroesophageal reflux disease, unspecified whether esophagitis present  Can continue pantoprazole 40 mg once daily at this time.  Orders:    EGD; Future    History of colonic polyps  --Due for colonoscopy, last one ~5 years ago with polyps.  --Diagnostic plan: Colonoscopy scheduled with endoscopy on 2025. Follow bowel prep instructions.  Orders:    Colonoscopy; Future          Results  - Imaging:    - Barium swallow study showed concern for obstruction     Other orders    lactated ringers infusion    lactated ringers infusion      Chief Complaint   Patient presents with    Np     EGD Consult       History of Present Illness  The patient is a 69-year-old male with a history of diverticulitis, including a previous perforation, and a cholecystectomy, presenting for an esophagogastroduodenoscopy (EGD) consultation as referred by Dr. Bowman.    Dysphagia  - The patient reports experiencing progressive dysphagia over several years, which he attributes to scarring from previous cervical spine surgery.  - He describes a sensation of  food becoming lodged in his throat but denies nausea or vomiting.  - He has no difficulty swallowing liquids.  - The dysphagia has impacted his singing voice.    Heartburn  - He occasionally experiences heartburn, particularly after overeating or consuming acidic foods.  - This is managed effectively with pantoprazole.    Endoscopy History  - His last endoscopy was performed by Dr. Serrano at [facility] in February 2023.  - He recalls a painful nasogastric (NG) tube insertion in the past and notes that the upcoming procedure will involve balloon dilation.  - He successfully swallowed a barium pill after an initial episode of regurgitation.    Bowel Movements  - He reports no irregularities in bowel movements.  - He completed the bowel preparation without issues and reports no constipation, with bowel movements influenced by his diet.  - He rarely consumes food from [employer] but experiences diarrhea when he does.    Colonoscopy  - The patient had his last colonoscopy approximately five years ago, during which polyps were identified.  - He is due for another colonoscopy soon.    Supplemental Information  - He is not currently taking any blood thinners, fish oils, or vitamins.  - He reports good sleep quality and has experienced weight loss.  - The patient also has dental implants.    SOCIAL HISTORY  - Retired    FAMILY HISTORY  - No family history of GI disorders or cancers    MEDICATIONS  - Pantoprazole       Historical Information   Past Medical History:   Diagnosis Date    Pacemaker     PTSD (post-traumatic stress disorder)      Past Surgical History:   Procedure Laterality Date    ABDOMINAL SURGERY      ATRIAL CARDIAC PACEMAKER INSERTION  2019    CARDIAC PACEMAKER PLACEMENT      CHOLECYSTECTOMY      DENTAL IMPLANT      NECK EXPLORATION Right 11/15/2021    Procedure: EXPLORATION NECK;  Surgeon: Mitchell Euceda MD;  Location:  MAIN OR;  Service: ENT    NECK SURGERY       Social History     Substance and Sexual  "Activity   Alcohol Use Never     Social History     Substance and Sexual Activity   Drug Use Never     Social History     Tobacco Use   Smoking Status Every Day    Current packs/day: 0.25    Types: Cigarettes   Smokeless Tobacco Never     Family History   Problem Relation Age of Onset    Colon polyps Neg Hx     Colon cancer Neg Hx        Meds/Allergies     Current Outpatient Medications:     albuterol (PROVENTIL HFA,VENTOLIN HFA) 90 mcg/act inhaler    aspirin 81 mg chewable tablet    atorvastatin (LIPITOR) 40 mg tablet    DULoxetine (CYMBALTA) 20 mg capsule    gabapentin (NEURONTIN) 600 MG tablet    multivitamin (THERAGRAN) TABS    ondansetron (Zofran ODT) 4 mg disintegrating tablet    pantoprazole (PROTONIX) 40 mg tablet    pramipexole (MIRAPEX) 0.5 mg tablet    pramipexole (MIRAPEX) 0.5 mg tablet    SUMAtriptan (IMITREX) 50 mg tablet    amphetamine-dextroamphetamine (ADDERALL XR) 20 MG 24 hr capsule    amphetamine-dextroamphetamine (ADDERALL XR) 20 MG 24 hr capsule    buPROPion (WELLBUTRIN XL) 300 mg 24 hr tablet    cariprazine (Vraylar) 1.5 MG capsule    cyclobenzaprine (FLEXERIL) 10 mg tablet    divalproex sodium (DEPAKOTE ER) 500 mg 24 hr tablet    Emollient (eucerin) lotion    LORazepam (ATIVAN) 1 mg tablet    omeprazole (PriLOSEC) 10 mg delayed release capsule    oxyCODONE (OXY-IR) 5 MG capsule    vilazodone (Viibryd) 20 mg tablet  Allergies   Allergen Reactions    Morphine Other (See Comments)    Diclofenac Rash    Diphenhydramine Other (See Comments) and Anxiety     unknown  Other reaction(s): Anxiety, Feeling agitated       PHYSICAL EXAM:    Blood pressure 136/79, height 6' 4\" (1.93 m), weight 104 kg (229 lb). Body mass index is 27.87 kg/m².  Physical Exam      General Appearance: No apparent distress, cooperative, alert.  Eyes: Anicteric.  Gastrointestinal: Soft, non-tender, non-distended; normal bowel sounds; no masses, no organomegaly.    Rectal: Deferred.  Musculoskeletal: No edema.  Skin: No jaundice. " "    OTHER LAB RESULTS:   Lab Results   Component Value Date    WBC 7.88 03/09/2022    WBC 8.39 02/11/2022    WBC 8.87 11/17/2021    HGB 14.1 03/09/2022    HGB 15.0 02/11/2022    HGB 14.2 11/17/2021    MCV 90 03/09/2022     03/09/2022     02/11/2022     11/17/2021     Lab Results   Component Value Date    K 4.8 03/09/2022     03/09/2022    CO2 25 03/09/2022    BUN 13 03/09/2022    CREATININE 0.96 03/09/2022    GLUCOSE 127 11/15/2021    GLUF 103 (H) 02/27/2021    CALCIUM 8.2 (L) 03/09/2022    CORRECTEDCA 9.5 03/19/2021    AST 45 03/09/2022    AST 17 02/11/2022    AST 13 03/19/2021    ALT 39 03/09/2022    ALT 42 02/11/2022    ALT 28 03/19/2021    ALKPHOS 136 (H) 03/09/2022    ALKPHOS 128 (H) 02/11/2022    ALKPHOS 116 03/19/2021    ALB 3.6 03/09/2022    TBILI 0.40 03/09/2022    TBILI 0.30 02/11/2022    TBILI 0.30 03/19/2021    EGFR 83 03/09/2022     No results found for: \"IRON\", \"TIBC\", \"FERRITIN\"  Lab Results   Component Value Date    LIPASE 67 (L) 03/09/2022       OTHER RADIOLOGY RESULTS:   No results found.  "

## 2025-02-11 NOTE — PROGRESS NOTES
Name: Gabe Lemus      : 1955      MRN: [id number]  Encounter Provider: Abigail Jordan MD  Encounter Date: 2025   Encounter department: [facility]    :  Assessment & Plan  Esophageal stenosis  --Reports difficulty swallowing solids, worsening over years. No nausea, vomiting, or heartburn unless large meals/acidic foods. On pantoprazole. Barium swallow study suggests obstruction. Endoscopy scheduled for 2025, with possible balloon dilation. NPO after midnight before procedure. [facility] report. If endoscopy normal, consider manometry.  --Diagnostic plan: Endoscopy scheduled for 2025, with possible balloon dilation. If endoscopy normal, consider manometry.  --Treatment plan: On pantoprazole. NPO after midnight before procedure. Bring [facility] report.  Orders:    EGD; Future    Dysphagia, unspecified type    Orders:    EGD; Future    Gastroesophageal reflux disease, unspecified whether esophagitis present  Can continue pantoprazole 40 mg once daily at this time.  Orders:    EGD; Future    History of colonic polyps  --Due for colonoscopy, last one ~5 years ago with polyps.  --Diagnostic plan: Colonoscopy scheduled with endoscopy on 2025. Follow bowel prep instructions.  Orders:    Colonoscopy; Future          Results  - Imaging:    - Barium swallow study showed concern for obstruction     Other orders    lactated ringers infusion    lactated ringers infusion      Chief Complaint   Patient presents with    Np     EGD Consult       History of Present Illness  The patient is a 69-year-old male with a history of diverticulitis, including a previous perforation, and a cholecystectomy, presenting for an esophagogastroduodenoscopy (EGD) consultation as referred by Dr. Bowman.    Dysphagia  - The patient reports experiencing progressive dysphagia over several years, which he attributes to scarring from previous cervical spine surgery.  - He describes a sensation of  food becoming lodged in his throat but denies nausea or vomiting.  - He has no difficulty swallowing liquids.  - The dysphagia has impacted his singing voice.    Heartburn  - He occasionally experiences heartburn, particularly after overeating or consuming acidic foods.  - This is managed effectively with pantoprazole.    Endoscopy History  - His last endoscopy was performed by Dr. Serrano at [facility] in February 2023.  - He recalls a painful nasogastric (NG) tube insertion in the past and notes that the upcoming procedure will involve balloon dilation.  - He successfully swallowed a barium pill after an initial episode of regurgitation.    Bowel Movements  - He reports no irregularities in bowel movements.  - He completed the bowel preparation without issues and reports no constipation, with bowel movements influenced by his diet.  - He rarely consumes food from [employer] but experiences diarrhea when he does.    Colonoscopy  - The patient had his last colonoscopy approximately five years ago, during which polyps were identified.  - He is due for another colonoscopy soon.    Supplemental Information  - He is not currently taking any blood thinners, fish oils, or vitamins.  - He reports good sleep quality and has experienced weight loss.  - The patient also has dental implants.    SOCIAL HISTORY  - Retired    FAMILY HISTORY  - No family history of GI disorders or cancers    MEDICATIONS  - Pantoprazole       Historical Information   Past Medical History:   Diagnosis Date    Pacemaker     PTSD (post-traumatic stress disorder)      Past Surgical History:   Procedure Laterality Date    ABDOMINAL SURGERY      ATRIAL CARDIAC PACEMAKER INSERTION  2019    CARDIAC PACEMAKER PLACEMENT      CHOLECYSTECTOMY      DENTAL IMPLANT      NECK EXPLORATION Right 11/15/2021    Procedure: EXPLORATION NECK;  Surgeon: Mitchell Euceda MD;  Location:  MAIN OR;  Service: ENT    NECK SURGERY       Social History     Substance and Sexual  "Activity   Alcohol Use Never     Social History     Substance and Sexual Activity   Drug Use Never     Social History     Tobacco Use   Smoking Status Every Day    Current packs/day: 0.25    Types: Cigarettes   Smokeless Tobacco Never     Family History   Problem Relation Age of Onset    Colon polyps Neg Hx     Colon cancer Neg Hx        Meds/Allergies     Current Outpatient Medications:     albuterol (PROVENTIL HFA,VENTOLIN HFA) 90 mcg/act inhaler    aspirin 81 mg chewable tablet    atorvastatin (LIPITOR) 40 mg tablet    DULoxetine (CYMBALTA) 20 mg capsule    gabapentin (NEURONTIN) 600 MG tablet    multivitamin (THERAGRAN) TABS    ondansetron (Zofran ODT) 4 mg disintegrating tablet    pantoprazole (PROTONIX) 40 mg tablet    pramipexole (MIRAPEX) 0.5 mg tablet    pramipexole (MIRAPEX) 0.5 mg tablet    SUMAtriptan (IMITREX) 50 mg tablet    amphetamine-dextroamphetamine (ADDERALL XR) 20 MG 24 hr capsule    amphetamine-dextroamphetamine (ADDERALL XR) 20 MG 24 hr capsule    buPROPion (WELLBUTRIN XL) 300 mg 24 hr tablet    cariprazine (Vraylar) 1.5 MG capsule    cyclobenzaprine (FLEXERIL) 10 mg tablet    divalproex sodium (DEPAKOTE ER) 500 mg 24 hr tablet    Emollient (eucerin) lotion    LORazepam (ATIVAN) 1 mg tablet    omeprazole (PriLOSEC) 10 mg delayed release capsule    oxyCODONE (OXY-IR) 5 MG capsule    vilazodone (Viibryd) 20 mg tablet  Allergies   Allergen Reactions    Morphine Other (See Comments)    Diclofenac Rash    Diphenhydramine Other (See Comments) and Anxiety     unknown  Other reaction(s): Anxiety, Feeling agitated       PHYSICAL EXAM:    Blood pressure 136/79, height 6' 4\" (1.93 m), weight 104 kg (229 lb). Body mass index is 27.87 kg/m².  Physical Exam      General Appearance: No apparent distress, cooperative, alert.  Eyes: Anicteric.  Gastrointestinal: Soft, non-tender, non-distended; normal bowel sounds; no masses, no organomegaly.    Rectal: Deferred.  Musculoskeletal: No edema.  Skin: No jaundice. " "    OTHER LAB RESULTS:   Lab Results   Component Value Date    WBC 7.88 03/09/2022    WBC 8.39 02/11/2022    WBC 8.87 11/17/2021    HGB 14.1 03/09/2022    HGB 15.0 02/11/2022    HGB 14.2 11/17/2021    MCV 90 03/09/2022     03/09/2022     02/11/2022     11/17/2021     Lab Results   Component Value Date    K 4.8 03/09/2022     03/09/2022    CO2 25 03/09/2022    BUN 13 03/09/2022    CREATININE 0.96 03/09/2022    GLUCOSE 127 11/15/2021    GLUF 103 (H) 02/27/2021    CALCIUM 8.2 (L) 03/09/2022    CORRECTEDCA 9.5 03/19/2021    AST 45 03/09/2022    AST 17 02/11/2022    AST 13 03/19/2021    ALT 39 03/09/2022    ALT 42 02/11/2022    ALT 28 03/19/2021    ALKPHOS 136 (H) 03/09/2022    ALKPHOS 128 (H) 02/11/2022    ALKPHOS 116 03/19/2021    ALB 3.6 03/09/2022    TBILI 0.40 03/09/2022    TBILI 0.30 02/11/2022    TBILI 0.30 03/19/2021    EGFR 83 03/09/2022     No results found for: \"IRON\", \"TIBC\", \"FERRITIN\"  Lab Results   Component Value Date    LIPASE 67 (L) 03/09/2022       OTHER RADIOLOGY RESULTS:   No results found.  "

## 2025-02-19 ENCOUNTER — TELEPHONE (OUTPATIENT)
Dept: GASTROENTEROLOGY | Facility: CLINIC | Age: 70
End: 2025-02-19

## 2025-02-20 DIAGNOSIS — Z86.0100 HISTORY OF COLONIC POLYPS: ICD-10-CM

## 2025-02-20 NOTE — TELEPHONE ENCOUNTER
Patients GI provider:  Dr. Jordan    Number to return call: [phone]    Reason for call: [facility] Jordan wanted. The number is [phone]    Scheduled procedure/appointment date if applicable: Apt/procedure 02/27/25

## 2025-02-26 ENCOUNTER — TELEPHONE (OUTPATIENT)
Age: 70
End: 2025-02-26

## 2025-02-26 DIAGNOSIS — Z12.11 SCREENING FOR COLON CANCER: Primary | ICD-10-CM

## 2025-02-26 NOTE — TELEPHONE ENCOUNTER
Pt calling because he has a colonoscopy tomorrow and has not [facility], he checked the tracking and says its due for delivery tomorrow but that will be to late as he needs it today. He is asking if we can please resend it to the [facility] he has on file and he will just go pick it up. He would like us to call him once we send it so he knows he is ok to go get it.

## 2025-02-26 NOTE — TELEPHONE ENCOUNTER
Patients GI provider:  Dr. Jordan    Number to return call: [phone]    Reason for call: Pt calling because he has a colonoscopy tomorrow and has not been [facility], he checked the tracking and says its due for delivery tomorrow but that will be to late as he needs it today. He is asking if we can please resend it to the [facility] he has on file and he will just go pick it up. He would like us to call him once we send it so he knows he is ok to go get it.

## 2025-02-27 ENCOUNTER — ANESTHESIA EVENT (OUTPATIENT)
Dept: GASTROENTEROLOGY | Facility: HOSPITAL | Age: 70
End: 2025-02-27
Payer: COMMERCIAL

## 2025-02-27 ENCOUNTER — ANESTHESIA (OUTPATIENT)
Dept: GASTROENTEROLOGY | Facility: HOSPITAL | Age: 70
End: 2025-02-27
Payer: COMMERCIAL

## 2025-02-27 ENCOUNTER — HOSPITAL ENCOUNTER (OUTPATIENT)
Dept: GASTROENTEROLOGY | Facility: HOSPITAL | Age: 70
Setting detail: OUTPATIENT SURGERY
End: 2025-02-27
Attending: INTERNAL MEDICINE
Payer: COMMERCIAL

## 2025-02-27 VITALS
OXYGEN SATURATION: 95 % | HEIGHT: 76 IN | WEIGHT: 229 LBS | TEMPERATURE: 97.5 F | DIASTOLIC BLOOD PRESSURE: 68 MMHG | RESPIRATION RATE: 23 BRPM | SYSTOLIC BLOOD PRESSURE: 115 MMHG | HEART RATE: 73 BPM | BODY MASS INDEX: 27.89 KG/M2

## 2025-02-27 DIAGNOSIS — K22.2 ESOPHAGEAL STENOSIS: ICD-10-CM

## 2025-02-27 DIAGNOSIS — R13.10 DYSPHAGIA, UNSPECIFIED TYPE: ICD-10-CM

## 2025-02-27 DIAGNOSIS — Z86.0100 HISTORY OF COLONIC POLYPS: ICD-10-CM

## 2025-02-27 DIAGNOSIS — K21.9 GASTROESOPHAGEAL REFLUX DISEASE, UNSPECIFIED WHETHER ESOPHAGITIS PRESENT: ICD-10-CM

## 2025-02-27 PROCEDURE — 43450 DILATE ESOPHAGUS 1/MULT PASS: CPT | Performed by: INTERNAL MEDICINE

## 2025-02-27 PROCEDURE — 43239 EGD BIOPSY SINGLE/MULTIPLE: CPT | Performed by: INTERNAL MEDICINE

## 2025-02-27 PROCEDURE — 88305 TISSUE EXAM BY PATHOLOGIST: CPT | Performed by: PATHOLOGY

## 2025-02-27 PROCEDURE — 45390 COLONOSCOPY W/RESECTION: CPT | Performed by: INTERNAL MEDICINE

## 2025-02-27 PROCEDURE — 45385 COLONOSCOPY W/LESION REMOVAL: CPT | Performed by: INTERNAL MEDICINE

## 2025-02-27 RX ORDER — PROPOFOL 10 MG/ML
INJECTION, EMULSION INTRAVENOUS CONTINUOUS PRN
Status: DISCONTINUED | OUTPATIENT
Start: 2025-02-27 | End: 2025-02-27

## 2025-02-27 RX ORDER — PROPOFOL 10 MG/ML
INJECTION, EMULSION INTRAVENOUS AS NEEDED
Status: DISCONTINUED | OUTPATIENT
Start: 2025-02-27 | End: 2025-02-27

## 2025-02-27 RX ORDER — SODIUM CHLORIDE 9 MG/ML
INJECTION, SOLUTION INTRAVENOUS CONTINUOUS PRN
Status: DISCONTINUED | OUTPATIENT
Start: 2025-02-27 | End: 2025-02-27

## 2025-02-27 RX ORDER — LIDOCAINE HYDROCHLORIDE 10 MG/ML
INJECTION, SOLUTION EPIDURAL; INFILTRATION; INTRACAUDAL; PERINEURAL AS NEEDED
Status: DISCONTINUED | OUTPATIENT
Start: 2025-02-27 | End: 2025-02-27

## 2025-02-27 RX ADMIN — PROPOFOL 50 MG: 10 INJECTION, EMULSION INTRAVENOUS at 10:16

## 2025-02-27 RX ADMIN — LIDOCAINE HYDROCHLORIDE 100 MG: 10 INJECTION, SOLUTION EPIDURAL; INFILTRATION; INTRACAUDAL; PERINEURAL at 10:15

## 2025-02-27 RX ADMIN — SODIUM CHLORIDE: 9 INJECTION, SOLUTION INTRAVENOUS at 10:02

## 2025-02-27 RX ADMIN — PROPOFOL 100 MG: 10 INJECTION, EMULSION INTRAVENOUS at 10:15

## 2025-02-27 RX ADMIN — PROPOFOL 120 MCG/KG/MIN: 10 INJECTION, EMULSION INTRAVENOUS at 10:19

## 2025-02-27 NOTE — ANESTHESIA POSTPROCEDURE EVALUATION
Post-Op Assessment Note    CV Status:  Stable  Pain Score: 0    Pain management: adequate       Mental Status:  Alert and awake   Hydration Status:  Euvolemic   PONV Controlled:  Controlled   Airway Patency:  Patent     Post Op Vitals Reviewed: Yes    No anethesia notable event occurred.    Staff: Anesthesiologist           Last Filed PACU Vitals:  Vitals Value Taken Time   Temp     Pulse 73 02/27/25 1108   /68 02/27/25 1108   Resp 23 02/27/25 1108   SpO2 95 % 02/27/25 1108       Modified Jennifer:     Vitals Value Taken Time   Activity 2 02/27/25 1109   Respiration 2 02/27/25 1109   Circulation 2 02/27/25 1109   Consciousness 2 02/27/25 1109   Oxygen Saturation 2 02/27/25 1109     Modified Jennifer Score: 10

## 2025-02-27 NOTE — ANESTHESIA POSTPROCEDURE EVALUATION
Post-Op Assessment Note    CV Status:  Stable  Pain Score: 0    Pain management: adequate       Mental Status:  Awake and alert   Hydration Status:  Stable   PONV Controlled:  None   Airway Patency:  Patent     Post Op Vitals Reviewed: Yes    No anethesia notable event occurred.    Staff: CRNA           Last Filed PACU Vitals:  Vitals Value Taken Time   Temp     Pulse     BP     Resp 15    SpO2 98%

## 2025-02-27 NOTE — ANESTHESIA PREPROCEDURE EVALUATION
Procedure:  EGD  COLONOSCOPY    Relevant Problems   CARDIO   (+) HLD (hyperlipidemia)   (+) Pacemaker ([employer] Type 2.  Reportedly Normal Stress Test last month at [place]. Results not available.)      GI/HEPATIC   (+) Gastric outlet obstruction      NEURO/PSYCH   (+) PTSD (post-traumatic stress disorder)      PULMONARY   (+) Smoking (3/day)   (-) URI (upper respiratory infection)      Neurology/Sleep   (+) History of traumatic brain injury   11/21  1 week s/p ACDF with large fluid collection and airway compromise, for emergent intubation/neck exploration  Grade 1 view with Glidescope     Physical Exam    Airway    Mallampati score: II  TM Distance: >3 FB  Neck ROM: full     Dental   Comment: Upper Implants secure, No notable dental hx     Cardiovascular      Pulmonary   Breath sounds clear to auscultation    Other Findings        Anesthesia Plan  ASA Score- 3     Anesthesia Type- IV sedation with anesthesia with ASA Monitors.         Additional Monitors:     Airway Plan:            Plan Factors-Exercise tolerance (METS): >4 METS.    Chart reviewed. EKG reviewed.   Patient summary reviewed.    Patient is a current smoker.  Patient smoked on day of surgery (1 this am).            Induction- intravenous.    Postoperative Plan-         Informed Consent- Anesthetic plan and risks discussed with patient.  I personally reviewed this patient with the CRNA. Discussed and agreed on the Anesthesia Plan with the CRNA..      NPO Status:  No vitals data found for the desired time range.

## 2025-02-27 NOTE — INTERVAL H&P NOTE
H&P reviewed. After examining the patient I find no changes in the patients condition since the H&P had been written.    Vitals:    02/27/25 0959   BP: 123/82   Pulse: 70   Resp: 18   Temp: 97.5 °F (36.4 °C)   SpO2: 96%

## 2025-03-03 ENCOUNTER — RESULTS FOLLOW-UP (OUTPATIENT)
Dept: GASTROENTEROLOGY | Facility: CLINIC | Age: 70
End: 2025-03-03

## 2025-03-03 PROCEDURE — 88305 TISSUE EXAM BY PATHOLOGIST: CPT | Performed by: PATHOLOGY

## 2025-03-05 ENCOUNTER — HOSPITAL ENCOUNTER (EMERGENCY)
Dept: HOSPITAL 99 - EMR | Age: 70
Discharge: HOME | End: 2025-03-05
Payer: COMMERCIAL

## 2025-03-05 VITALS — SYSTOLIC BLOOD PRESSURE: 132 MMHG | RESPIRATION RATE: 16 BRPM | DIASTOLIC BLOOD PRESSURE: 74 MMHG

## 2025-03-05 VITALS — SYSTOLIC BLOOD PRESSURE: 146 MMHG | DIASTOLIC BLOOD PRESSURE: 80 MMHG | RESPIRATION RATE: 18 BRPM

## 2025-03-05 DIAGNOSIS — E78.00: ICD-10-CM

## 2025-03-05 DIAGNOSIS — G47.30: ICD-10-CM
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
## 2025-03-05 DIAGNOSIS — S20.212A: Primary | ICD-10-CM

## 2025-03-05 DIAGNOSIS — I25.10: ICD-10-CM

## 2025-03-05 DIAGNOSIS — Z87.820: ICD-10-CM

## 2025-03-05 DIAGNOSIS — F17.200: ICD-10-CM

## 2025-03-05 DIAGNOSIS — E11.9: ICD-10-CM

## 2025-03-05 DIAGNOSIS — Z95.0: ICD-10-CM

## 2025-03-05 PROCEDURE — 96374 THER/PROPH/DIAG INJ IV PUSH: CPT

## 2025-03-05 PROCEDURE — 99284 EMERGENCY DEPT VISIT MOD MDM: CPT

## 2025-03-05 RX ADMIN — KETOROLAC TROMETHAMINE 15 MG: 15 INJECTION, SOLUTION INTRAMUSCULAR; INTRAVENOUS at 20:35

## 2025-03-24 ENCOUNTER — TELEPHONE (OUTPATIENT)
Age: 70
End: 2025-03-24

## 2025-03-24 NOTE — TELEPHONE ENCOUNTER
[facility] called to get the [facility] of [facility] to them at [phone], phone# [phone]. She will be faxing a release form to the office as well

## 2025-03-24 NOTE — TELEPHONE ENCOUNTER
Patients GI provider:  Dr. Jordan    Number to return call: [phone]    Reason for call:  [facility] calling [facility] to see if the pt went to his the appt they scheduled for him on 3/7.    Scheduled procedure/appointment date if applicable:

## 2025-03-28 ENCOUNTER — HOSPITAL ENCOUNTER (EMERGENCY)
Dept: HOSPITAL 99 - EMR | Age: 70
Discharge: HOME | End: 2025-03-28
Payer: COMMERCIAL

## 2025-03-28 VITALS — RESPIRATION RATE: 20 BRPM | DIASTOLIC BLOOD PRESSURE: 92 MMHG | SYSTOLIC BLOOD PRESSURE: 142 MMHG

## 2025-03-28 VITALS — RESPIRATION RATE: 18 BRPM

## 2025-03-28 VITALS — BODY MASS INDEX: 27.1 KG/M2

## 2025-03-28 VITALS — SYSTOLIC BLOOD PRESSURE: 172 MMHG | RESPIRATION RATE: 16 BRPM | DIASTOLIC BLOOD PRESSURE: 96 MMHG

## 2025-03-28 VITALS — SYSTOLIC BLOOD PRESSURE: 154 MMHG | DIASTOLIC BLOOD PRESSURE: 87 MMHG

## 2025-03-28 VITALS — RESPIRATION RATE: 17 BRPM

## 2025-03-28 VITALS — DIASTOLIC BLOOD PRESSURE: 87 MMHG | SYSTOLIC BLOOD PRESSURE: 154 MMHG

## 2025-03-28 VITALS — RESPIRATION RATE: 14 BRPM

## 2025-03-28 VITALS — RESPIRATION RATE: 21 BRPM

## 2025-03-28 VITALS — RESPIRATION RATE: 13 BRPM | DIASTOLIC BLOOD PRESSURE: 88 MMHG | SYSTOLIC BLOOD PRESSURE: 149 MMHG

## 2025-03-28 VITALS — RESPIRATION RATE: 20 BRPM

## 2025-03-28 DIAGNOSIS — J40: Primary | ICD-10-CM

## 2025-03-28 DIAGNOSIS — Z95.0: ICD-10-CM

## 2025-03-28 DIAGNOSIS — Z11.52: ICD-10-CM

## 2025-03-28 DIAGNOSIS — R03.0: ICD-10-CM

## 2025-03-28 DIAGNOSIS — F17.200: ICD-10-CM

## 2025-03-28 LAB
BUN SERPL-MCNC: 13 MG/DL (ref 9–20)
CALCIUM SERPL-MCNC: 9.7 MG/DL (ref 8.4–10.2)
CHLORIDE SERPL-SCNC: 106 MMOL/L (ref 98–107)
CO2 SERPL-SCNC: 25 MMOL/L (ref 22–30)
D-DIMER: 0.29 UG/MLFEU (ref 0–0.5)
EGFR: > 60
ERYTHROCYTE [DISTWIDTH] IN BLOOD BY AUTOMATED COUNT: 11.9 % (ref 11.5–14.5)
ESTIMATED CREATININE CLEARANCE: 98 ML/MIN
GLUCOSE SERPL-MCNC: 151 MG/DL (ref 70–99)
HCT VFR BLD AUTO: 42.8 % (ref 39–52)
HGB BLD-MCNC: 15.6 G/DL (ref 13–18)
MCHC RBC AUTO-ENTMCNC: 36.4 G/DL (ref 33–37)
MCV RBC AUTO: 90.3 FL (ref 80–94)
NRBC BLD AUTO-RTO: 0 %
PLATELET # BLD AUTO: 188 10^3/UL (ref 130–400)
POTASSIUM SERPL-SCNC: 4.3 MMOL/L (ref 3.5–5.1)
SODIUM SERPL-SCNC: 138 MMOL/L (ref 135–145)
TROPONIN I SERPL-MCNC: < 0.012 NG/ML

## 2025-03-28 PROCEDURE — 93288 INTERROG EVL PM/LDLS PM IP: CPT

## 2025-03-28 PROCEDURE — 99285 EMERGENCY DEPT VISIT HI MDM: CPT

## 2025-07-14 ENCOUNTER — HOSPITAL ENCOUNTER (EMERGENCY)
Dept: HOSPITAL 99 - EMR | Age: 70
LOS: 1 days | Discharge: HOME | End: 2025-07-15
Payer: COMMERCIAL

## 2025-07-14 VITALS — SYSTOLIC BLOOD PRESSURE: 121 MMHG | DIASTOLIC BLOOD PRESSURE: 94 MMHG

## 2025-07-14 VITALS — DIASTOLIC BLOOD PRESSURE: 98 MMHG | SYSTOLIC BLOOD PRESSURE: 167 MMHG

## 2025-07-14 VITALS — BODY MASS INDEX: 27 KG/M2

## 2025-07-14 DIAGNOSIS — Z87.820: ICD-10-CM

## 2025-07-14 DIAGNOSIS — M43.22: ICD-10-CM

## 2025-07-14 DIAGNOSIS — G43.909: ICD-10-CM

## 2025-07-14 DIAGNOSIS — Z88.8: ICD-10-CM

## 2025-07-14 DIAGNOSIS — R20.0: ICD-10-CM

## 2025-07-14 DIAGNOSIS — F32.A: ICD-10-CM

## 2025-07-14 DIAGNOSIS — E78.00: ICD-10-CM

## 2025-07-14 DIAGNOSIS — R11.0: ICD-10-CM

## 2025-07-14 DIAGNOSIS — G47.30: ICD-10-CM

## 2025-07-14 DIAGNOSIS — Z95.0: ICD-10-CM

## 2025-07-14 DIAGNOSIS — Z87.442: ICD-10-CM

## 2025-07-14 DIAGNOSIS — K57.92: ICD-10-CM

## 2025-07-14 DIAGNOSIS — F43.10: ICD-10-CM

## 2025-07-14 DIAGNOSIS — I25.10: ICD-10-CM

## 2025-07-14 DIAGNOSIS — G25.81: ICD-10-CM

## 2025-07-14 DIAGNOSIS — F17.200: ICD-10-CM

## 2025-07-14 DIAGNOSIS — R20.2: Primary | ICD-10-CM

## 2025-07-14 DIAGNOSIS — I44.1: ICD-10-CM

## 2025-07-14 DIAGNOSIS — R42: ICD-10-CM

## 2025-07-14 DIAGNOSIS — F41.9: ICD-10-CM

## 2025-07-14 DIAGNOSIS — E11.9: ICD-10-CM

## 2025-07-14 LAB
ALBUMIN SERPL-MCNC: 4.4 G/DL (ref 3.5–5)
ALP SERPL-CCNC: 135 U/L (ref 38–126)
ALT SERPL-CCNC: 28 U/L (ref 0–50)
AST SERPL-CCNC: 18 U/L (ref 17–59)
BASOPHILS # BLD AUTO: 0.1 10^3/UL (ref 0–0.2)
BASOPHILS NFR BLD AUTO: 0.9 % (ref 0–2)
BILIRUB SERPL-MCNC: 0.5 MG/DL (ref 0.2–1.3)
BUN SERPL-MCNC: 15 MG/DL (ref 9–20)
CALCIUM SERPL-MCNC: 9.3 MG/DL (ref 8.4–10.2)
CHLORIDE SERPL-SCNC: 106 MMOL/L (ref 98–107)
CO2 SERPL-SCNC: 26 MMOL/L (ref 22–30)
COMMENT: (no result)
CREAT SERPL-MCNC: 0.9 MG/DL (ref 0.7–1.3)
EGFR: > 60
EOSINOPHIL # BLD AUTO: 0.2 10^3/UL (ref 0–0.7)
EOSINOPHIL NFR BLD AUTO: 2.7 % (ref 0–6)
ERYTHROCYTE [DISTWIDTH] IN BLOOD BY AUTOMATED COUNT: 12 % (ref 11.5–14.5)
ESTIMATED CREATININE CLEARANCE: (no result) ML/MIN
GLUCOSE SERPL-MCNC: 291 MG/DL (ref 70–99)
HCT VFR BLD AUTO: 41.6 % (ref 39–52)
HGB BLD-MCNC: 15.2 G/DL (ref 13–18)
IMM GRANULOCYTES # BLD AUTO: 0 10^3/UL (ref 0–0.05)
IMM GRANULOCYTES NFR BLD AUTO: 0.4 % (ref 0–0.5)
LYMPHOCYTES # BLD AUTO: 3 10^3/UL (ref 1.2–3.4)
LYMPHOCYTES NFR BLD AUTO: 40 % (ref 20.5–51.1)
MCH RBC QN AUTO: 33.3 PG (ref 27–31)
MCHC RBC AUTO-ENTMCNC: 36.5 G/DL (ref 33–37)
MCV RBC AUTO: 91 FL (ref 80–94)
MONOCYTES # BLD AUTO: 0.7 10^3/UL (ref 0.1–0.6)
MONOCYTES NFR BLD AUTO: 9.9 % (ref 1.7–9.3)
NEUTROPHILS # BLD AUTO: 3.4 10^3/UL (ref 1.4–6.5)
NEUTROPHILS NFR BLD AUTO: 46.1 % (ref 42.2–75.2)
NRBC BLD AUTO-RTO: 0 %
PLATELET # BLD AUTO: 226 10^3/UL (ref 130–400)
PMV BLD AUTO: 10.8 FL (ref 7.4–10.4)
POTASSIUM SERPL-SCNC: 4 MMOL/L (ref 3.5–5.1)
PROT SERPL-MCNC: 7.1 G/DL (ref 6.3–8.2)
RBC # BLD AUTO: 4.57 10^6/UL (ref 4.7–6.1)
SODIUM SERPL-SCNC: 137 MMOL/L (ref 135–145)
TROPONIN I SERPL-MCNC: < 0.012 NG/ML
WBC # BLD AUTO: 7.4 10^3/UL (ref 4.8–10.8)

## 2025-07-14 PROCEDURE — 99284 EMERGENCY DEPT VISIT MOD MDM: CPT

## 2025-07-15 VITALS — SYSTOLIC BLOOD PRESSURE: 131 MMHG | DIASTOLIC BLOOD PRESSURE: 82 MMHG

## 2025-07-15 VITALS — SYSTOLIC BLOOD PRESSURE: 116 MMHG | DIASTOLIC BLOOD PRESSURE: 74 MMHG

## 2025-07-24 ENCOUNTER — HOSPITAL ENCOUNTER (EMERGENCY)
Dept: HOSPITAL 99 - EMR | Age: 70
Discharge: HOME | End: 2025-07-24
Payer: COMMERCIAL

## 2025-07-24 VITALS — SYSTOLIC BLOOD PRESSURE: 113 MMHG | DIASTOLIC BLOOD PRESSURE: 77 MMHG

## 2025-07-24 VITALS — SYSTOLIC BLOOD PRESSURE: 140 MMHG | DIASTOLIC BLOOD PRESSURE: 85 MMHG

## 2025-07-24 VITALS — DIASTOLIC BLOOD PRESSURE: 99 MMHG | SYSTOLIC BLOOD PRESSURE: 167 MMHG

## 2025-07-24 VITALS — DIASTOLIC BLOOD PRESSURE: 80 MMHG | SYSTOLIC BLOOD PRESSURE: 111 MMHG

## 2025-07-24 VITALS — BODY MASS INDEX: 27.4 KG/M2

## 2025-07-24 DIAGNOSIS — G47.30: ICD-10-CM

## 2025-07-24 DIAGNOSIS — F17.200: ICD-10-CM

## 2025-07-24 DIAGNOSIS — E11.65: Primary | ICD-10-CM

## 2025-07-24 DIAGNOSIS — Z87.820: ICD-10-CM

## 2025-07-24 DIAGNOSIS — I25.10: ICD-10-CM

## 2025-07-24 DIAGNOSIS — E78.00: ICD-10-CM

## 2025-07-24 DIAGNOSIS — Z95.0: ICD-10-CM

## 2025-07-24 LAB
ALBUMIN SERPL-MCNC: 4.2 G/DL (ref 3.5–5)
ALP SERPL-CCNC: 128 U/L (ref 38–126)
ALT SERPL-CCNC: 25 U/L (ref 0–50)
AMORPH SED URNS QL MICRO: (no result)
APPEARANCE UR: CLEAR
AST SERPL-CCNC: 19 U/L (ref 17–59)
BASOPHILS # BLD AUTO: 0.1 10^3/UL (ref 0–0.2)
BASOPHILS NFR BLD AUTO: 0.8 % (ref 0–2)
BILIRUB SERPL-MCNC: 0.5 MG/DL (ref 0.2–1.3)
BILIRUB UR QL STRIP.AUTO: NEGATIVE
BUN SERPL-MCNC: 14 MG/DL (ref 9–20)
CALCIUM SERPL-MCNC: 9.4 MG/DL (ref 8.4–10.2)
CAOX CRY URNS QL MICRO: (no result)
CHLORIDE SERPL-SCNC: 107 MMOL/L (ref 98–107)
CO2 SERPL-SCNC: 25 MMOL/L (ref 22–30)
COLOR UR: YELLOW
COMMENT: (no result)
CREAT SERPL-MCNC: 0.9 MG/DL (ref 0.7–1.3)
CYSTINE CRY URNS QL MICRO: (no result)
EGFR: > 60
EOSINOPHIL # BLD AUTO: 0.1 10^3/UL (ref 0–0.7)
EOSINOPHIL NFR BLD AUTO: 2.1 % (ref 0–6)
ERYTHROCYTE [DISTWIDTH] IN BLOOD BY AUTOMATED COUNT: 11.9 % (ref 11.5–14.5)
ERYTHROCYTE [SEDIMENTATION RATE] IN BLOOD: 9 MM/HOUR (ref 0–20)
ESTIMATED CREATININE CLEARANCE: (no result) ML/MIN
GLUCOSE - POINT OF CARE: 194 MG/DL (ref 70–99)
GLUCOSE - POINT OF CARE: 256 MG/DL (ref 70–99)
GLUCOSE - POINT OF CARE: 277 MG/DL (ref 70–99)
GLUCOSE SERPL-MCNC: 272 MG/DL (ref 70–99)
GLUCOSE UR QL STRIP.AUTO: (no result)
GRAN CASTS URNS QL MICRO: (no result) /LPF
HCT VFR BLD AUTO: 40.8 % (ref 39–52)
HGB BLD-MCNC: 15 G/DL (ref 13–18)
HGB UR QL: NEGATIVE
HYALINE CASTS URNS QL MICRO: (no result) /LPF (ref 0–2)
IMM GRANULOCYTES # BLD AUTO: 0 10^3/UL (ref 0–0.05)
IMM GRANULOCYTES NFR BLD AUTO: 0.3 % (ref 0–0.5)
LEUKOCYTE ESTERASE UR QL STRIP.AUTO: NEGATIVE
LYMPHOCYTES # BLD AUTO: 2.5 10^3/UL (ref 1.2–3.4)
LYMPHOCYTES NFR BLD AUTO: 40.4 % (ref 20.5–51.1)
Lab: (no result) /LPF
Lab: (no result) /LPF
MCH RBC QN AUTO: 33.1 PG (ref 27–31)
MCHC RBC AUTO-ENTMCNC: 36.8 G/DL (ref 33–37)
MCV RBC AUTO: 90.1 FL (ref 80–94)
MONOCYTES # BLD AUTO: 0.7 10^3/UL (ref 0.1–0.6)
MONOCYTES NFR BLD AUTO: 10.4 % (ref 1.7–9.3)
MUCOUS THREADS URNS QL MICRO: (no result)
NEUTROPHILS # BLD AUTO: 2.9 10^3/UL (ref 1.4–6.5)
NEUTROPHILS NFR BLD AUTO: 46 % (ref 42.2–75.2)
NITRITE UR QL STRIP.AUTO: NEGATIVE
NRBC BLD AUTO-RTO: 0 %
PH UR: 6 [PH] (ref 5–9)
PLATELET # BLD AUTO: 192 10^3/UL (ref 130–400)
PMV BLD AUTO: 11.2 FL (ref 7.4–10.4)
POTASSIUM SERPL-SCNC: 4 MMOL/L (ref 3.5–5.1)
PROT SERPL-MCNC: 6.6 G/DL (ref 6.3–8.2)
RBC # BLD AUTO: 4.53 10^6/UL (ref 4.7–6.1)
SODIUM SERPL-SCNC: 137 MMOL/L (ref 135–145)
SP GR UR: 1.02 (ref ?–1.03)
SQUAMOUS URNS QL MICRO: (no result) /LPF
TRANS CELLS UR QL COMP ASSIST: (no result) /LPF
TRI-PHOS CRY URNS QL MICRO: (no result)
URATE CRY URNS QL MICRO: (no result)
URINE ALBUMIN: (no result)
URINE BACTERIA: (no result)
URINE EPITHELIAL CAST: (no result) /LPF
URINE KETONE: NEGATIVE
URINE OTHER: (no result)
URINE RED BLOOD CELL: (no result) /HPF (ref 0–2)
URINE RED CELL CAST: (no result) /LPF
URINE SPERM: (no result)
URINE TRICHOMONAS: (no result)
URINE WHITE CELL CAST: (no result) /LPF
URINE WHITE CELL: (no result) /HPF (ref 0–5)
UROBILINOGEN UR QL STRIP.AUTO: NEGATIVE
WBC # BLD AUTO: 6.3 10^3/UL (ref 4.8–10.8)
YEAST #/AREA URNS HPF: (no result) /[HPF]

## 2025-07-24 PROCEDURE — 96361 HYDRATE IV INFUSION ADD-ON: CPT

## 2025-07-24 PROCEDURE — 96372 THER/PROPH/DIAG INJ SC/IM: CPT

## 2025-07-24 PROCEDURE — 99284 EMERGENCY DEPT VISIT MOD MDM: CPT

## 2025-07-24 PROCEDURE — 96374 THER/PROPH/DIAG INJ IV PUSH: CPT

## 2025-07-24 RX ADMIN — SODIUM CHLORIDE 1000: 900 INJECTION, SOLUTION INTRAVENOUS at 19:33

## 2025-07-24 RX ADMIN — INSULIN HUMAN 8 UNITS: 100 INJECTION, SOLUTION PARENTERAL at 19:36

## 2025-07-24 RX ADMIN — PANTOPRAZOLE SODIUM 40 MG: 40 INJECTION, POWDER, LYOPHILIZED, FOR SOLUTION INTRAVENOUS at 20:44

## 2025-07-24 RX ADMIN — ALUMINUM HYDROXIDE, MAGNESIUM HYDROXIDE, AND SIMETHICONE 40: 200; 200; 20 SUSPENSION ORAL at 20:41

## 2025-07-31 ENCOUNTER — OFFICE VISIT (OUTPATIENT)
Age: 70
End: 2025-07-31

## 2025-07-31 VITALS
WEIGHT: 221 LBS | HEIGHT: 76 IN | BODY MASS INDEX: 26.91 KG/M2 | RESPIRATION RATE: 20 BRPM | DIASTOLIC BLOOD PRESSURE: 93 MMHG | SYSTOLIC BLOOD PRESSURE: 153 MMHG | HEART RATE: 74 BPM | TEMPERATURE: 97.6 F | OXYGEN SATURATION: 95 %

## 2025-07-31 DIAGNOSIS — H10.31 ACUTE CONJUNCTIVITIS OF RIGHT EYE, UNSPECIFIED ACUTE CONJUNCTIVITIS TYPE: Primary | ICD-10-CM

## 2025-07-31 PROCEDURE — PBNCHG PB NO CHARGE PLACEHOLDER: Performed by: STUDENT IN AN ORGANIZED HEALTH CARE EDUCATION/TRAINING PROGRAM

## 2025-07-31 RX ORDER — FLUTICASONE PROPIONATE 50 MCG
1 SPRAY, SUSPENSION (ML) NASAL DAILY
COMMUNITY
Start: 2024-12-22

## 2025-07-31 RX ORDER — POLYMYXIN B SULFATE AND TRIMETHOPRIM 1; 10000 MG/ML; [USP'U]/ML
1 SOLUTION OPHTHALMIC EVERY 6 HOURS
Qty: 10 ML | Refills: 0 | Status: SHIPPED | OUTPATIENT
Start: 2025-07-31 | End: 2025-08-07

## 2025-07-31 RX ORDER — TRAZODONE HYDROCHLORIDE 100 MG/1
100 TABLET ORAL
COMMUNITY
Start: 2025-07-01

## 2025-07-31 RX ORDER — SILDENAFIL 100 MG/1
100 TABLET, FILM COATED ORAL AS NEEDED
COMMUNITY
Start: 2024-12-22

## 2025-07-31 RX ORDER — FENOFIBRATE 48 MG/1
48 TABLET, FILM COATED ORAL DAILY
COMMUNITY
Start: 2025-04-15

## 2025-07-31 RX ORDER — POLYMYXIN B SULFATE AND TRIMETHOPRIM 1; 10000 MG/ML; [USP'U]/ML
1 SOLUTION OPHTHALMIC EVERY 6 HOURS
Qty: 10 ML | Refills: 0 | Status: SHIPPED | OUTPATIENT
Start: 2025-07-31 | End: 2025-07-31

## 2025-07-31 RX ORDER — VILAZODONE HYDROCHLORIDE 40 MG/1
40 TABLET ORAL
COMMUNITY
Start: 2025-07-01

## 2025-07-31 RX ORDER — ATOMOXETINE 40 MG/1
80 CAPSULE ORAL DAILY
COMMUNITY
Start: 2025-07-15

## (undated) DEVICE — CHLORAPREP HI-LITE 10.5ML ORANGE

## (undated) DEVICE — GLOVE SRG BIOGEL 8

## (undated) DEVICE — GLOVE INDICATOR PI UNDERGLOVE SZ 7 BLUE

## (undated) DEVICE — PACK UNIVERSAL NECK

## (undated) DEVICE — SUT MONOCRYL 5-0 P-3 18 IN Y493G

## (undated) DEVICE — SUT VICRYL 3-0 SH 27 IN J416H

## (undated) DEVICE — GLOVE SRG BIOGEL 7

## (undated) DEVICE — GLOVE SRG BIOGEL ECLIPSE 6.5

## (undated) DEVICE — INTENDED FOR TISSUE SEPARATION, AND OTHER PROCEDURES THAT REQUIRE A SHARP SURGICAL BLADE TO PUNCTURE OR CUT.: Brand: BARD-PARKER SAFETY BLADES SIZE 11, STERILE

## (undated) DEVICE — GLOVE INDICATOR PI UNDERGLOVE SZ 7.5 BLUE

## (undated) DEVICE — CULTURE TUBE ANAEROBIC

## (undated) DEVICE — GLOVE SRG BIOGEL 7.5

## (undated) DEVICE — CULTURE TUBE AEROBIC

## (undated) DEVICE — TIBURON SPLIT SHEET: Brand: CONVERTORS

## (undated) DEVICE — BIPOLAR CORD DISP